# Patient Record
Sex: MALE | Race: WHITE | NOT HISPANIC OR LATINO | Employment: UNEMPLOYED | URBAN - NONMETROPOLITAN AREA
[De-identification: names, ages, dates, MRNs, and addresses within clinical notes are randomized per-mention and may not be internally consistent; named-entity substitution may affect disease eponyms.]

---

## 2024-03-15 ENCOUNTER — HOSPITAL ENCOUNTER (EMERGENCY)
Facility: HOSPITAL | Age: 54
Discharge: ANOTHER HOSPITAL | End: 2024-03-15
Attending: EMERGENCY MEDICINE
Payer: MEDICARE

## 2024-03-15 ENCOUNTER — HOSPITAL ENCOUNTER (OUTPATIENT)
Facility: HOSPITAL | Age: 54
Discharge: LEFT BEFORE DISCHARGE | DRG: 951 | End: 2024-03-15
Attending: INTERNAL MEDICINE | Admitting: INTERNAL MEDICINE
Payer: MEDICARE

## 2024-03-15 ENCOUNTER — APPOINTMENT (OUTPATIENT)
Dept: CARDIOLOGY | Facility: HOSPITAL | Age: 54
DRG: 951 | End: 2024-03-15
Payer: MEDICARE

## 2024-03-15 ENCOUNTER — APPOINTMENT (OUTPATIENT)
Dept: RADIOLOGY | Facility: HOSPITAL | Age: 54
End: 2024-03-15
Payer: MEDICARE

## 2024-03-15 VITALS
OXYGEN SATURATION: 98 % | WEIGHT: 240 LBS | HEART RATE: 108 BPM | BODY MASS INDEX: 32.51 KG/M2 | RESPIRATION RATE: 18 BRPM | TEMPERATURE: 97.9 F | SYSTOLIC BLOOD PRESSURE: 161 MMHG | HEIGHT: 72 IN | DIASTOLIC BLOOD PRESSURE: 112 MMHG

## 2024-03-15 VITALS — SYSTOLIC BLOOD PRESSURE: 172 MMHG | HEART RATE: 98 BPM | RESPIRATION RATE: 16 BRPM | DIASTOLIC BLOOD PRESSURE: 93 MMHG

## 2024-03-15 DIAGNOSIS — N13.2 HYDRONEPHROSIS CONCURRENT WITH AND DUE TO CALCULI OF KIDNEY AND URETER: ICD-10-CM

## 2024-03-15 DIAGNOSIS — N25.89 ACIDOSIS, RENAL TUBULAR: ICD-10-CM

## 2024-03-15 DIAGNOSIS — I10 HYPERTENSION, UNSPECIFIED TYPE: ICD-10-CM

## 2024-03-15 DIAGNOSIS — N17.9 ACUTE RENAL FAILURE, UNSPECIFIED ACUTE RENAL FAILURE TYPE (CMS-HCC): ICD-10-CM

## 2024-03-15 DIAGNOSIS — N20.1 CALCULUS OF DISTAL LEFT URETER: Primary | ICD-10-CM

## 2024-03-15 LAB
ANION GAP BLDV CALCULATED.4IONS-SCNC: 24 MMOL/L (ref 10–25)
ANION GAP SERPL CALC-SCNC: 24 MMOL/L (ref 10–20)
ANION GAP SERPL CALC-SCNC: 25 MMOL/L (ref 10–20)
APPEARANCE UR: ABNORMAL
BASE EXCESS BLDV CALC-SCNC: -12.4 MMOL/L (ref -2–3)
BASOPHILS # BLD AUTO: 0.04 X10*3/UL (ref 0–0.1)
BASOPHILS NFR BLD AUTO: 0.3 %
BILIRUB UR STRIP.AUTO-MCNC: NEGATIVE MG/DL
BODY TEMPERATURE: ABNORMAL
BUN SERPL-MCNC: 88 MG/DL (ref 6–23)
BUN SERPL-MCNC: 89 MG/DL (ref 6–23)
CA-I BLDV-SCNC: 1.15 MMOL/L (ref 1.1–1.33)
CALCIUM SERPL-MCNC: 9 MG/DL (ref 8.6–10.3)
CALCIUM SERPL-MCNC: 9.1 MG/DL (ref 8.6–10.3)
CHLORIDE BLDV-SCNC: 104 MMOL/L (ref 98–107)
CHLORIDE SERPL-SCNC: 102 MMOL/L (ref 98–107)
CHLORIDE SERPL-SCNC: 102 MMOL/L (ref 98–107)
CO2 SERPL-SCNC: 12 MMOL/L (ref 21–32)
CO2 SERPL-SCNC: 13 MMOL/L (ref 21–32)
COLOR UR: YELLOW
CREAT SERPL-MCNC: 5.65 MG/DL (ref 0.5–1.3)
CREAT SERPL-MCNC: 5.98 MG/DL (ref 0.5–1.3)
EGFRCR SERPLBLD CKD-EPI 2021: 11 ML/MIN/1.73M*2
EGFRCR SERPLBLD CKD-EPI 2021: 11 ML/MIN/1.73M*2
EOSINOPHIL # BLD AUTO: 0.09 X10*3/UL (ref 0–0.7)
EOSINOPHIL NFR BLD AUTO: 0.6 %
ERYTHROCYTE [DISTWIDTH] IN BLOOD BY AUTOMATED COUNT: 13.8 % (ref 11.5–14.5)
GLUCOSE BLD MANUAL STRIP-MCNC: 104 MG/DL (ref 74–99)
GLUCOSE BLDV-MCNC: 118 MG/DL (ref 74–99)
GLUCOSE SERPL-MCNC: 121 MG/DL (ref 74–99)
GLUCOSE SERPL-MCNC: 133 MG/DL (ref 74–99)
GLUCOSE UR STRIP.AUTO-MCNC: NEGATIVE MG/DL
HCO3 BLDV-SCNC: 12.5 MMOL/L (ref 22–26)
HCT VFR BLD AUTO: 38 % (ref 41–52)
HCT VFR BLD EST: 39 % (ref 41–52)
HGB BLD-MCNC: 12.6 G/DL (ref 13.5–17.5)
HGB BLDV-MCNC: 13.1 G/DL (ref 13.5–17.5)
HOLD SPECIMEN: NORMAL
HOLD SPECIMEN: NORMAL
HYALINE CASTS #/AREA URNS AUTO: ABNORMAL /LPF
IMM GRANULOCYTES # BLD AUTO: 0.07 X10*3/UL (ref 0–0.7)
IMM GRANULOCYTES NFR BLD AUTO: 0.5 % (ref 0–0.9)
INHALED O2 CONCENTRATION: 21 %
KETONES UR STRIP.AUTO-MCNC: NEGATIVE MG/DL
LACTATE BLDV-SCNC: 0.7 MMOL/L (ref 0.4–2)
LACTATE SERPL-SCNC: 0.9 MMOL/L (ref 0.4–2)
LEUKOCYTE ESTERASE UR QL STRIP.AUTO: ABNORMAL
LYMPHOCYTES # BLD AUTO: 1.33 X10*3/UL (ref 1.2–4.8)
LYMPHOCYTES NFR BLD AUTO: 8.6 %
MCH RBC QN AUTO: 32.2 PG (ref 26–34)
MCHC RBC AUTO-ENTMCNC: 33.2 G/DL (ref 32–36)
MCV RBC AUTO: 97 FL (ref 80–100)
MONOCYTES # BLD AUTO: 1.33 X10*3/UL (ref 0.1–1)
MONOCYTES NFR BLD AUTO: 8.6 %
NEUTROPHILS # BLD AUTO: 12.58 X10*3/UL (ref 1.2–7.7)
NEUTROPHILS NFR BLD AUTO: 81.4 %
NITRITE UR QL STRIP.AUTO: NEGATIVE
NRBC BLD-RTO: 0 /100 WBCS (ref 0–0)
OXYHGB MFR BLDV: 93.3 % (ref 45–75)
PCO2 BLDV: 26 MM HG (ref 41–51)
PH BLDV: 7.29 PH (ref 7.33–7.43)
PH UR STRIP.AUTO: 5 [PH]
PLATELET # BLD AUTO: 350 X10*3/UL (ref 150–450)
PO2 BLDV: 70 MM HG (ref 35–45)
POTASSIUM BLDV-SCNC: 7.9 MMOL/L (ref 3.5–5.3)
POTASSIUM SERPL-SCNC: 5.9 MMOL/L (ref 3.5–5.3)
POTASSIUM SERPL-SCNC: 6 MMOL/L (ref 3.5–5.3)
PROT UR STRIP.AUTO-MCNC: ABNORMAL MG/DL
RBC # BLD AUTO: 3.91 X10*6/UL (ref 4.5–5.9)
RBC # UR STRIP.AUTO: ABNORMAL /UL
RBC #/AREA URNS AUTO: >20 /HPF
SAO2 % BLDV: 96 % (ref 45–75)
SODIUM BLDV-SCNC: 133 MMOL/L (ref 136–145)
SODIUM SERPL-SCNC: 132 MMOL/L (ref 136–145)
SODIUM SERPL-SCNC: 134 MMOL/L (ref 136–145)
SP GR UR STRIP.AUTO: 1.02
UROBILINOGEN UR STRIP.AUTO-MCNC: <2 MG/DL
WBC # BLD AUTO: 15.4 X10*3/UL (ref 4.4–11.3)
WBC #/AREA URNS AUTO: ABNORMAL /HPF

## 2024-03-15 PROCEDURE — 2720000007 HC OR 272 NO HCPCS

## 2024-03-15 PROCEDURE — 36415 COLL VENOUS BLD VENIPUNCTURE: CPT | Performed by: EMERGENCY MEDICINE

## 2024-03-15 PROCEDURE — 96366 THER/PROPH/DIAG IV INF ADDON: CPT

## 2024-03-15 PROCEDURE — 96375 TX/PRO/DX INJ NEW DRUG ADDON: CPT

## 2024-03-15 PROCEDURE — 82947 ASSAY GLUCOSE BLOOD QUANT: CPT | Mod: 59

## 2024-03-15 PROCEDURE — 74176 CT ABD & PELVIS W/O CONTRAST: CPT

## 2024-03-15 PROCEDURE — 96367 TX/PROPH/DG ADDL SEQ IV INF: CPT

## 2024-03-15 PROCEDURE — 74176 CT ABD & PELVIS W/O CONTRAST: CPT | Performed by: RADIOLOGY

## 2024-03-15 PROCEDURE — 2500000001 HC RX 250 WO HCPCS SELF ADMINISTERED DRUGS (ALT 637 FOR MEDICARE OP)

## 2024-03-15 PROCEDURE — 84132 ASSAY OF SERUM POTASSIUM: CPT | Performed by: EMERGENCY MEDICINE

## 2024-03-15 PROCEDURE — 87086 URINE CULTURE/COLONY COUNT: CPT | Mod: CONLAB | Performed by: EMERGENCY MEDICINE

## 2024-03-15 PROCEDURE — 85025 COMPLETE CBC W/AUTO DIFF WBC: CPT | Performed by: EMERGENCY MEDICINE

## 2024-03-15 PROCEDURE — C1894 INTRO/SHEATH, NON-LASER: HCPCS

## 2024-03-15 PROCEDURE — 2500000004 HC RX 250 GENERAL PHARMACY W/ HCPCS (ALT 636 FOR OP/ED): Performed by: EMERGENCY MEDICINE

## 2024-03-15 PROCEDURE — 2500000002 HC RX 250 W HCPCS SELF ADMINISTERED DRUGS (ALT 637 FOR MEDICARE OP, ALT 636 FOR OP/ED)

## 2024-03-15 PROCEDURE — 2500000001 HC RX 250 WO HCPCS SELF ADMINISTERED DRUGS (ALT 637 FOR MEDICARE OP): Performed by: EMERGENCY MEDICINE

## 2024-03-15 PROCEDURE — 83605 ASSAY OF LACTIC ACID: CPT | Performed by: EMERGENCY MEDICINE

## 2024-03-15 PROCEDURE — 81001 URINALYSIS AUTO W/SCOPE: CPT | Performed by: EMERGENCY MEDICINE

## 2024-03-15 PROCEDURE — 96361 HYDRATE IV INFUSION ADD-ON: CPT

## 2024-03-15 PROCEDURE — 96365 THER/PROPH/DIAG IV INF INIT: CPT

## 2024-03-15 PROCEDURE — 99285 EMERGENCY DEPT VISIT HI MDM: CPT | Mod: 25

## 2024-03-15 PROCEDURE — 2060000001 HC INTERMEDIATE ICU ROOM DAILY

## 2024-03-15 PROCEDURE — 2500000005 HC RX 250 GENERAL PHARMACY W/O HCPCS

## 2024-03-15 RX ORDER — SODIUM CHLORIDE 9 MG/ML
125 INJECTION, SOLUTION INTRAVENOUS CONTINUOUS
Status: DISCONTINUED | OUTPATIENT
Start: 2024-03-15 | End: 2024-03-15 | Stop reason: HOSPADM

## 2024-03-15 RX ORDER — LISINOPRIL AND HYDROCHLOROTHIAZIDE 20; 25 MG/1; MG/1
1 TABLET ORAL DAILY
COMMUNITY

## 2024-03-15 RX ORDER — CLONIDINE HYDROCHLORIDE 0.1 MG/1
TABLET ORAL
Status: COMPLETED
Start: 2024-03-15 | End: 2024-03-15

## 2024-03-15 RX ORDER — CLONIDINE HYDROCHLORIDE 0.1 MG/1
0.1 TABLET ORAL ONCE
Status: COMPLETED | OUTPATIENT
Start: 2024-03-15 | End: 2024-03-15

## 2024-03-15 RX ORDER — ONDANSETRON HYDROCHLORIDE 2 MG/ML
4 INJECTION, SOLUTION INTRAVENOUS ONCE
Status: DISCONTINUED | OUTPATIENT
Start: 2024-03-15 | End: 2024-03-15 | Stop reason: HOSPADM

## 2024-03-15 RX ORDER — SODIUM POLYSTYRENE SULFONATE 15 G/60ML
15 SUSPENSION ORAL; RECTAL ONCE
Status: COMPLETED | OUTPATIENT
Start: 2024-03-15 | End: 2024-03-15

## 2024-03-15 RX ORDER — SODIUM POLYSTYRENE SULFONATE 15 G/60ML
SUSPENSION ORAL; RECTAL
Status: COMPLETED
Start: 2024-03-15 | End: 2024-03-15

## 2024-03-15 RX ORDER — DEXTROSE 50 % IN WATER (D50W) INTRAVENOUS SYRINGE
25 ONCE
Status: COMPLETED | OUTPATIENT
Start: 2024-03-15 | End: 2024-03-15

## 2024-03-15 RX ORDER — DEXTROSE 50 % IN WATER (D50W) INTRAVENOUS SYRINGE
Status: COMPLETED
Start: 2024-03-15 | End: 2024-03-15

## 2024-03-15 RX ORDER — KETOROLAC TROMETHAMINE 30 MG/ML
30 INJECTION, SOLUTION INTRAMUSCULAR; INTRAVENOUS ONCE
Status: COMPLETED | OUTPATIENT
Start: 2024-03-15 | End: 2024-03-15

## 2024-03-15 RX ADMIN — SODIUM CHLORIDE 1000 ML: 9 INJECTION, SOLUTION INTRAVENOUS at 10:34

## 2024-03-15 RX ADMIN — CLONIDINE HYDROCHLORIDE 0.1 MG: 0.1 TABLET ORAL at 11:33

## 2024-03-15 RX ADMIN — SODIUM POLYSTYRENE SULFONATE 15 G: 15 SUSPENSION ORAL; RECTAL at 12:29

## 2024-03-15 RX ADMIN — SODIUM CHLORIDE 125 ML/HR: 9 INJECTION, SOLUTION INTRAVENOUS at 10:35

## 2024-03-15 RX ADMIN — INSULIN HUMAN 5 UNITS: 100 INJECTION, SOLUTION PARENTERAL at 12:30

## 2024-03-15 RX ADMIN — CLONIDINE HYDROCHLORIDE 0.1 MG: 0.1 TABLET ORAL at 14:19

## 2024-03-15 RX ADMIN — DEXTROSE 50 % IN WATER (D50W) INTRAVENOUS SYRINGE 25 G: at 12:29

## 2024-03-15 RX ADMIN — CALCIUM GLUCONATE 1 G: 98 INJECTION, SOLUTION INTRAVENOUS at 12:20

## 2024-03-15 RX ADMIN — DEXTROSE MONOHYDRATE 25 G: 25 INJECTION, SOLUTION INTRAVENOUS at 12:29

## 2024-03-15 RX ADMIN — KETOROLAC TROMETHAMINE 30 MG: 30 INJECTION, SOLUTION INTRAMUSCULAR at 10:32

## 2024-03-15 RX ADMIN — SODIUM BICARBONATE 75 ML/HR: 84 INJECTION, SOLUTION INTRAVENOUS at 13:38

## 2024-03-15 ASSESSMENT — COLUMBIA-SUICIDE SEVERITY RATING SCALE - C-SSRS
2. HAVE YOU ACTUALLY HAD ANY THOUGHTS OF KILLING YOURSELF?: NO
1. IN THE PAST MONTH, HAVE YOU WISHED YOU WERE DEAD OR WISHED YOU COULD GO TO SLEEP AND NOT WAKE UP?: NO
6. HAVE YOU EVER DONE ANYTHING, STARTED TO DO ANYTHING, OR PREPARED TO DO ANYTHING TO END YOUR LIFE?: NO
1. IN THE PAST MONTH, HAVE YOU WISHED YOU WERE DEAD OR WISHED YOU COULD GO TO SLEEP AND NOT WAKE UP?: NO
6. HAVE YOU EVER DONE ANYTHING, STARTED TO DO ANYTHING, OR PREPARED TO DO ANYTHING TO END YOUR LIFE?: NO
2. HAVE YOU ACTUALLY HAD ANY THOUGHTS OF KILLING YOURSELF?: NO

## 2024-03-15 ASSESSMENT — PAIN SCALES - GENERAL
PAINLEVEL_OUTOF10: 5 - MODERATE PAIN
PAINLEVEL_OUTOF10: 5 - MODERATE PAIN

## 2024-03-15 ASSESSMENT — PAIN DESCRIPTION - ORIENTATION: ORIENTATION: LEFT

## 2024-03-15 ASSESSMENT — PAIN - FUNCTIONAL ASSESSMENT
PAIN_FUNCTIONAL_ASSESSMENT: 0-10
PAIN_FUNCTIONAL_ASSESSMENT: 0-10

## 2024-03-15 ASSESSMENT — PAIN DESCRIPTION - PROGRESSION: CLINICAL_PROGRESSION: NOT CHANGED

## 2024-03-15 ASSESSMENT — PAIN DESCRIPTION - PAIN TYPE: TYPE: ACUTE PAIN

## 2024-03-15 NOTE — ED PROVIDER NOTES
[facility]   ED  Provider Note  3/15/2024  9:20 AM  AC04/AC04        History of Present Illness:   Iglesia Clarke is a 53 y.o. male presenting to the ED for renal colic, beginning last night.  The complaint has been persistent, we are in severity, and worsened by nothing.  Patient has passed multiple kidney stones in the past he feels the stone is stuck at the base of his penis.  He cannot urinate more  than a few drops of urine at a time.  He tried to put a pen  in his penis to release the stone but did not work.  He also has a large left inguinal hernia with bowel in the scrotum.      Review of Systems:   Pertinent positives and review of systems as noted above.  Remaining 10 review of systems is negative or noncontributory to today's episode of care.  Review of Systems       --------------------------------------------- PAST HISTORY ---------------------------------------------  Past Medical History:  has a past medical history of Kidney stones.  Hypertension, left inguinal hernia with bowel in the left scrotum.     Past Surgical History:  has no past surgical history on file.    Social History:  reports that he has never smoked. He has never used smokeless tobacco. He reports that he does not currently use alcohol.    Family History: family history is not on file. Unless otherwise noted, family history is non contributory    Patient's Medications   New Prescriptions    No medications on file   Previous Medications    LISINOPRIL-HYDROCHLOROTHIAZIDE 20-25 MG TABLET    Take 1 tablet by mouth once daily.   Modified Medications    No medications on file   Discontinued Medications    No medications on file      The patient’s home medications have been reviewed.    Allergies: Opioids - morphine analogues    -------------------------------------------------- RESULTS -------------------------------------------------  All laboratory and radiology results have been personally reviewed by myself   LABS:  Labs Reviewed    URINALYSIS WITH REFLEX MICROSCOPIC - Abnormal       Result Value    Color, Urine Yellow      Appearance, Urine Hazy (*)     Specific Gravity, Urine 1.018      pH, Urine 5.0      Protein, Urine 100 (2+) (*)     Glucose, Urine NEGATIVE      Blood, Urine LARGE (3+) (*)     Ketones, Urine NEGATIVE      Bilirubin, Urine NEGATIVE      Urobilinogen, Urine <2.0      Nitrite, Urine NEGATIVE      Leukocyte Esterase, Urine SMALL (1+) (*)    BASIC METABOLIC PANEL - Abnormal    Glucose 121 (*)     Sodium 132 (*)     Potassium 5.9 (*)     Chloride 102      Bicarbonate 12 (*)     Anion Gap 24 (*)     Urea Nitrogen 89 (*)     Creatinine 5.65 (*)     eGFR 11 (*)     Calcium 9.1     CBC WITH AUTO DIFFERENTIAL - Abnormal    WBC 15.4 (*)     nRBC 0.0      RBC 3.91 (*)     Hemoglobin 12.6 (*)     Hematocrit 38.0 (*)     MCV 97      MCH 32.2      MCHC 33.2      RDW 13.8      Platelets 350      Neutrophils % 81.4      Immature Granulocytes %, Automated 0.5      Lymphocytes % 8.6      Monocytes % 8.6      Eosinophils % 0.6      Basophils % 0.3      Neutrophils Absolute 12.58 (*)     Immature Granulocytes Absolute, Automated 0.07      Lymphocytes Absolute 1.33      Monocytes Absolute 1.33 (*)     Eosinophils Absolute 0.09      Basophils Absolute 0.04     MICROSCOPIC ONLY, URINE - Abnormal    WBC, Urine 1-5      RBC, Urine >20 (*)     Hyaline Casts, Urine OCCASIONAL (*)    LACTATE - Normal    Lactate 0.9      Narrative:     Venipuncture immediately after or during the administration of Metamizole may lead to falsely low results. Testing should be performed immediately  prior to Metamizole dosing.   URINE CULTURE   BLOOD GAS VENOUS FULL PANEL     EKG: Sinus tachycardia at 108 bpm, normal axis, right bundle branch block, nonspecific ST changes, no acute ST elevations.  Interpreted by HERNAN Castillo MD    RADIOLOGY:  Interpreted by Radiologist.  CT abdomen pelvis wo IV contrast   Final Result   1. 9.7 mm obstructing stone within the distal left  ureter with   moderate hydronephrosis and hydroureter. Several additional stones   seen about the left kidney.   2. Severe atrophy of the right kidney likely due to chronic   obstruction from distal ureteral stones. No hydronephrosis or   hydroureter to suggest significant residual renal function.   3. Large left inguinal hernia only partly visualized extending into   the left hemiscrotum.        MACRO:   None.        Signed by: Jose Guadalupe Ireland 3/15/2024 10:23 AM   Dictation workstation:   [id number]          No results found for this or any previous visit (from the past 4464 hour(s)).  ------------------------- NURSING NOTES AND VITALS REVIEWED ---------------------------   The nursing notes within the ED encounter and vital signs as below have been reviewed.   BP (!) 200/123   Pulse (!) 118   Temp 36.6 °C (97.9 °F) (Temporal)   Resp 18   Ht 1.829 m (6')   Wt 109 kg (240 lb)   SpO2 94%   BMI 32.55 kg/m²   Oxygen Saturation Interpretation: Normal      ---------------------------------------------------PHYSICAL EXAM--------------------------------------  Physical Exam   Constitutional/General: Alert and oriented x3, well appearing, non toxic in NAD  Head: Normocephalic and atraumatic  Eyes: PERRL, EOMI, conjunctiva normal, sclera non icteric  Mouth: Oropharynx clear, handling secretions, no trismus, no asymmetry of the posterior oropharynx or uvular edema  Neck: Supple, full ROM, non tender to palpation in the midline, no stridor, no crepitus, no meningeal signs  Respiratory: Lungs clear to auscultation bilaterally, no wheezes, rales, or rhonchi. Not in respiratory distress  Cardiovascular:  Regular rate. Regular rhythm. No murmurs, gallops, or rubs. 2+ distal pulses  Chest: No chest wall tenderness  GI:  Abdomen Soft, Non tender, Non distended.  +BS. No organomegaly, no palpable masses,  No rebound, guarding, or rigidity.   Musculoskeletal: Moves all extremities x 4. Warm and well perfused, no clubbing,  cyanosis, or edema. Capillary refill <3 seconds  Integument: skin warm and dry. No rashes.   Lymphatic: no lymphadenopathy noted  Neurologic: No focal deficits, symmetric strength 5/5 in the upper and lower extremities bilaterally  Psychiatric: Normal Affect    Procedures    ------------------------------ ED COURSE/MEDICAL DECISION MAKING----------------------  Diagnoses as of 03/15/24 1206   Calculus of distal left ureter   Hydronephrosis concurrent with and due to calculi of kidney and ureter   Acute renal failure, unspecified acute renal failure type (CMS/HCC)   Acidosis, renal tubular   Hypertension, unspecified type      Patient has a large 9.5 mm stone blocking his left distal ureter.  His right kidney is atrophic and does not appear to be functional.  His BUN is 89 with a creatinine of 4.6.  His GFR is 11.  His last GFR in the computer was in 2019 was 35.  Patient also has a potassium of 5.9.  He has been treated with a potassium cocktail including insulin, D50, Kayexalate, calcium oxalate, and a sodium bicarbonate drip.  I discussed this case with Alvino Chu and Emmanuel from [facility], Alvino Kang and Elli from [facility] to arrange urgent decompression of the left kidney.  He has been treated with clonidine for his significant hypertension his current blood pressure is down to 192/113.  This pain has been controlled with Toradol and Zofran.  Patient's repeat potassium is 6.0 up from 5.9 this morning.  This is after his treatment for hyperkalemia.  He is being transferred to [facility] for further care and treatment.      Medical Decision Making:   Transfer  Diagnoses as of 03/15/24 1206   Calculus of distal left ureter   Hydronephrosis concurrent with and due to calculi of kidney and ureter   Acute renal failure, unspecified acute renal failure type (CMS/HCC)   Acidosis, renal tubular   Hypertension, unspecified type      Counseling:   The emergency provider has spoken with the patient and discussed today’s  results, in addition to providing specific details for the plan of care and counseling regarding the diagnosis and prognosis.  Questions are answered at this time and they are agreeable with the plan.      --------------------------------- IMPRESSION AND DISPOSITION ---------------------------------        IMPRESSION  1. Calculus of distal left ureter    2. Hydronephrosis concurrent with and due to calculi of kidney and ureter    3. Acute renal failure, unspecified acute renal failure type (CMS/[facility])    4. Acidosis, renal tubular    5. Hypertension, unspecified type        DISPOSITION  Disposition: [facility]  Patient condition is critical  CRITICAL CARE TIME     CRITICAL CARE :  The high probability of clinically significant deterioration in the patient’s condition required my highest level of medical decision making and my highest level of preparedness to intervene emergently.   I provided minutes 45 of critical care, not including other billable services/procedures.    The patient was reevaluated/re-examined multiple times during the visit. Critical care time includes management at bedside, discussion with other providers and consultants, family counseling and answering questions, and documentation. Care involves decision making of high complexity to assess, manipulate, and support vital organ system failure and/or to prevent further life threatening deterioration of the patient's condition. Failure to initiate these interventions on an urgent basis would likely result in sudden, clinically significant or life threatening deterioration in the patient's condition of acute renal failure, renal htn, hyperkalemia, left hydronephrosis.       Jluis Castillo MD  03/17/24 3276

## 2024-03-15 NOTE — NURSING NOTE
Patient spoke with Dr Warren and refused procedure then asked the nurse to call his friend to pick him up Dr Warren returned and spoke with patient.  Patient decided to leave and was explained risks AMA paper provided patient refused to sign paper with Dr Warren present.  Surendra Reno supervisor notified.

## 2024-03-17 LAB — BACTERIA UR CULT: NO GROWTH

## 2024-03-20 ENCOUNTER — HOSPITAL ENCOUNTER (OUTPATIENT)
Dept: CARDIOLOGY | Facility: HOSPITAL | Age: 54
Discharge: HOME | End: 2024-03-20
Payer: MEDICARE

## 2024-03-20 LAB
ATRIAL RATE: 108 BPM
P AXIS: 41 DEGREES
P OFFSET: 182 MS
P ONSET: 130 MS
PR INTERVAL: 180 MS
Q ONSET: 220 MS
QRS COUNT: 17 BEATS
QRS DURATION: 140 MS
QT INTERVAL: 352 MS
QTC CALCULATION(BAZETT): 471 MS
QTC FREDERICIA: 428 MS
R AXIS: -10 DEGREES
T AXIS: 74 DEGREES
T OFFSET: 396 MS
VENTRICULAR RATE: 108 BPM

## 2024-03-20 PROCEDURE — 93005 ELECTROCARDIOGRAM TRACING: CPT

## 2024-03-29 ENCOUNTER — APPOINTMENT (OUTPATIENT)
Dept: RADIOLOGY | Facility: HOSPITAL | Age: 54
End: 2024-03-29
Payer: MEDICARE

## 2024-03-29 ENCOUNTER — HOSPITAL ENCOUNTER (EMERGENCY)
Facility: HOSPITAL | Age: 54
Discharge: OTHER INSTITUTION | End: 2024-03-30
Attending: EMERGENCY MEDICINE
Payer: MEDICARE

## 2024-03-29 DIAGNOSIS — K40.90 SCROTAL HERNIA: ICD-10-CM

## 2024-03-29 DIAGNOSIS — N17.9 ACUTE RENAL FAILURE, UNSPECIFIED ACUTE RENAL FAILURE TYPE (CMS-HCC): ICD-10-CM

## 2024-03-29 DIAGNOSIS — E87.5 HYPERKALEMIA: Primary | ICD-10-CM

## 2024-03-29 LAB
ALBUMIN SERPL BCP-MCNC: 4.9 G/DL (ref 3.4–5)
ALP SERPL-CCNC: 58 U/L (ref 33–120)
ALT SERPL W P-5'-P-CCNC: 9 U/L (ref 10–52)
ANION GAP SERPL CALC-SCNC: 19 MMOL/L (ref 10–20)
AST SERPL W P-5'-P-CCNC: 8 U/L (ref 9–39)
BASOPHILS # BLD AUTO: 0.03 X10*3/UL (ref 0–0.1)
BASOPHILS NFR BLD AUTO: 0.2 %
BILIRUB SERPL-MCNC: 0.3 MG/DL (ref 0–1.2)
BUN SERPL-MCNC: 138 MG/DL (ref 6–23)
CALCIUM SERPL-MCNC: 10.1 MG/DL (ref 8.6–10.3)
CARDIAC TROPONIN I PNL SERPL HS: 40 NG/L (ref 0–20)
CHLORIDE SERPL-SCNC: 111 MMOL/L (ref 98–107)
CO2 SERPL-SCNC: 7 MMOL/L (ref 21–32)
CREAT SERPL-MCNC: 5.21 MG/DL (ref 0.5–1.3)
EGFRCR SERPLBLD CKD-EPI 2021: 12 ML/MIN/1.73M*2
EOSINOPHIL # BLD AUTO: 0.09 X10*3/UL (ref 0–0.7)
EOSINOPHIL NFR BLD AUTO: 0.6 %
ERYTHROCYTE [DISTWIDTH] IN BLOOD BY AUTOMATED COUNT: 14.8 % (ref 11.5–14.5)
FLUAV RNA RESP QL NAA+PROBE: NOT DETECTED
FLUBV RNA RESP QL NAA+PROBE: NOT DETECTED
GLUCOSE SERPL-MCNC: 124 MG/DL (ref 74–99)
HCT VFR BLD AUTO: 36.8 % (ref 41–52)
HGB BLD-MCNC: 11.7 G/DL (ref 13.5–17.5)
IMM GRANULOCYTES # BLD AUTO: 0.07 X10*3/UL (ref 0–0.7)
IMM GRANULOCYTES NFR BLD AUTO: 0.5 % (ref 0–0.9)
LYMPHOCYTES # BLD AUTO: 1.74 X10*3/UL (ref 1.2–4.8)
LYMPHOCYTES NFR BLD AUTO: 12 %
MAGNESIUM SERPL-MCNC: 2.26 MG/DL (ref 1.6–2.4)
MCH RBC QN AUTO: 32.3 PG (ref 26–34)
MCHC RBC AUTO-ENTMCNC: 31.8 G/DL (ref 32–36)
MCV RBC AUTO: 102 FL (ref 80–100)
MONOCYTES # BLD AUTO: 1.38 X10*3/UL (ref 0.1–1)
MONOCYTES NFR BLD AUTO: 9.5 %
NEUTROPHILS # BLD AUTO: 11.22 X10*3/UL (ref 1.2–7.7)
NEUTROPHILS NFR BLD AUTO: 77.2 %
NRBC BLD-RTO: 0 /100 WBCS (ref 0–0)
PLATELET # BLD AUTO: 517 X10*3/UL (ref 150–450)
POTASSIUM SERPL-SCNC: 6.1 MMOL/L (ref 3.5–5.3)
PROT SERPL-MCNC: 9.1 G/DL (ref 6.4–8.2)
RBC # BLD AUTO: 3.62 X10*6/UL (ref 4.5–5.9)
SARS-COV-2 RNA RESP QL NAA+PROBE: NOT DETECTED
SODIUM SERPL-SCNC: 131 MMOL/L (ref 136–145)
WBC # BLD AUTO: 14.5 X10*3/UL (ref 4.4–11.3)

## 2024-03-29 PROCEDURE — 80053 COMPREHEN METABOLIC PANEL: CPT | Performed by: EMERGENCY MEDICINE

## 2024-03-29 PROCEDURE — 84484 ASSAY OF TROPONIN QUANT: CPT | Performed by: EMERGENCY MEDICINE

## 2024-03-29 PROCEDURE — 36415 COLL VENOUS BLD VENIPUNCTURE: CPT | Performed by: EMERGENCY MEDICINE

## 2024-03-29 PROCEDURE — 85025 COMPLETE CBC W/AUTO DIFF WBC: CPT | Performed by: EMERGENCY MEDICINE

## 2024-03-29 PROCEDURE — 87636 SARSCOV2 & INF A&B AMP PRB: CPT | Performed by: EMERGENCY MEDICINE

## 2024-03-29 PROCEDURE — 71045 X-RAY EXAM CHEST 1 VIEW: CPT

## 2024-03-29 PROCEDURE — 83735 ASSAY OF MAGNESIUM: CPT | Performed by: EMERGENCY MEDICINE

## 2024-03-29 PROCEDURE — 74176 CT ABD & PELVIS W/O CONTRAST: CPT

## 2024-03-29 PROCEDURE — 71045 X-RAY EXAM CHEST 1 VIEW: CPT | Performed by: RADIOLOGY

## 2024-03-29 PROCEDURE — 93975 VASCULAR STUDY: CPT | Performed by: RADIOLOGY

## 2024-03-29 PROCEDURE — 76870 US EXAM SCROTUM: CPT | Performed by: RADIOLOGY

## 2024-03-29 PROCEDURE — 93975 VASCULAR STUDY: CPT

## 2024-03-29 RX ORDER — ACETAMINOPHEN AND CODEINE PHOSPHATE 300; 30 MG/1; MG/1
1 TABLET ORAL EVERY 8 HOURS PRN
COMMUNITY

## 2024-03-29 RX ORDER — DOCUSATE SODIUM 250 MG
250 CAPSULE ORAL DAILY
COMMUNITY

## 2024-03-29 RX ORDER — KETOROLAC TROMETHAMINE 10 MG/1
10 TABLET, FILM COATED ORAL EVERY 6 HOURS PRN
COMMUNITY

## 2024-03-29 ASSESSMENT — PAIN - FUNCTIONAL ASSESSMENT
PAIN_FUNCTIONAL_ASSESSMENT: 0-10
PAIN_FUNCTIONAL_ASSESSMENT: 0-10

## 2024-03-29 ASSESSMENT — PAIN SCALES - GENERAL
PAINLEVEL_OUTOF10: 7
PAINLEVEL_OUTOF10: 0 - NO PAIN

## 2024-03-29 ASSESSMENT — PAIN DESCRIPTION - DESCRIPTORS: DESCRIPTORS: OTHER (COMMENT)

## 2024-03-29 ASSESSMENT — COLUMBIA-SUICIDE SEVERITY RATING SCALE - C-SSRS
2. HAVE YOU ACTUALLY HAD ANY THOUGHTS OF KILLING YOURSELF?: NO
6. HAVE YOU EVER DONE ANYTHING, STARTED TO DO ANYTHING, OR PREPARED TO DO ANYTHING TO END YOUR LIFE?: NO
1. IN THE PAST MONTH, HAVE YOU WISHED YOU WERE DEAD OR WISHED YOU COULD GO TO SLEEP AND NOT WAKE UP?: NO

## 2024-03-29 ASSESSMENT — PAIN DESCRIPTION - FREQUENCY: FREQUENCY: INTERMITTENT

## 2024-03-29 ASSESSMENT — PAIN DESCRIPTION - LOCATION: LOCATION: SCROTUM

## 2024-03-29 ASSESSMENT — PAIN DESCRIPTION - ONSET: ONSET: GRADUAL

## 2024-03-29 ASSESSMENT — PAIN DESCRIPTION - PROGRESSION: CLINICAL_PROGRESSION: GRADUALLY WORSENING

## 2024-03-30 ENCOUNTER — APPOINTMENT (OUTPATIENT)
Dept: CARDIOLOGY | Facility: HOSPITAL | Age: 54
End: 2024-03-30
Payer: MEDICARE

## 2024-03-30 ENCOUNTER — APPOINTMENT (OUTPATIENT)
Dept: RADIOLOGY | Facility: HOSPITAL | Age: 54
End: 2024-03-30
Payer: MEDICARE

## 2024-03-30 VITALS
RESPIRATION RATE: 19 BRPM | HEIGHT: 72 IN | DIASTOLIC BLOOD PRESSURE: 62 MMHG | OXYGEN SATURATION: 100 % | HEART RATE: 89 BPM | BODY MASS INDEX: 32.51 KG/M2 | TEMPERATURE: 98 F | WEIGHT: 240 LBS | SYSTOLIC BLOOD PRESSURE: 115 MMHG

## 2024-03-30 PROBLEM — E87.5 HYPERKALEMIA: Status: ACTIVE | Noted: 2024-03-30

## 2024-03-30 PROBLEM — K40.90 SCROTAL HERNIA: Status: ACTIVE | Noted: 2024-03-30

## 2024-03-30 LAB
ALBUMIN SERPL BCP-MCNC: 4.1 G/DL (ref 3.4–5)
ALP SERPL-CCNC: 50 U/L (ref 33–120)
ALT SERPL W P-5'-P-CCNC: 7 U/L (ref 10–52)
ANION GAP SERPL CALC-SCNC: 19 MMOL/L (ref 10–20)
AST SERPL W P-5'-P-CCNC: 7 U/L (ref 9–39)
BILIRUB SERPL-MCNC: 0.3 MG/DL (ref 0–1.2)
BUN SERPL-MCNC: 130 MG/DL (ref 6–23)
CALCIUM SERPL-MCNC: 9.8 MG/DL (ref 8.6–10.3)
CARDIAC TROPONIN I PNL SERPL HS: 39 NG/L (ref 0–20)
CARDIAC TROPONIN I PNL SERPL HS: 42 NG/L (ref 0–20)
CHLORIDE SERPL-SCNC: 115 MMOL/L (ref 98–107)
CO2 SERPL-SCNC: 7 MMOL/L (ref 21–32)
CREAT SERPL-MCNC: 4.75 MG/DL (ref 0.5–1.3)
EGFRCR SERPLBLD CKD-EPI 2021: 14 ML/MIN/1.73M*2
GLUCOSE BLD MANUAL STRIP-MCNC: 161 MG/DL (ref 74–99)
GLUCOSE SERPL-MCNC: 112 MG/DL (ref 74–99)
HOLD SPECIMEN: NORMAL
POTASSIUM SERPL-SCNC: 6.1 MMOL/L (ref 3.5–5.3)
PROT SERPL-MCNC: 7.7 G/DL (ref 6.4–8.2)
SODIUM SERPL-SCNC: 135 MMOL/L (ref 136–145)

## 2024-03-30 PROCEDURE — 96365 THER/PROPH/DIAG IV INF INIT: CPT

## 2024-03-30 PROCEDURE — 93005 ELECTROCARDIOGRAM TRACING: CPT

## 2024-03-30 PROCEDURE — 74176 CT ABD & PELVIS W/O CONTRAST: CPT | Performed by: RADIOLOGY

## 2024-03-30 PROCEDURE — 36415 COLL VENOUS BLD VENIPUNCTURE: CPT | Performed by: EMERGENCY MEDICINE

## 2024-03-30 PROCEDURE — 2500000004 HC RX 250 GENERAL PHARMACY W/ HCPCS (ALT 636 FOR OP/ED): Performed by: EMERGENCY MEDICINE

## 2024-03-30 PROCEDURE — 84484 ASSAY OF TROPONIN QUANT: CPT | Performed by: EMERGENCY MEDICINE

## 2024-03-30 PROCEDURE — 2500000004 HC RX 250 GENERAL PHARMACY W/ HCPCS (ALT 636 FOR OP/ED): Mod: JZ,TB | Performed by: EMERGENCY MEDICINE

## 2024-03-30 PROCEDURE — 82947 ASSAY GLUCOSE BLOOD QUANT: CPT | Mod: 59

## 2024-03-30 PROCEDURE — 2500000001 HC RX 250 WO HCPCS SELF ADMINISTERED DRUGS (ALT 637 FOR MEDICARE OP): Performed by: EMERGENCY MEDICINE

## 2024-03-30 PROCEDURE — 2500000001 HC RX 250 WO HCPCS SELF ADMINISTERED DRUGS (ALT 637 FOR MEDICARE OP)

## 2024-03-30 PROCEDURE — 70450 CT HEAD/BRAIN W/O DYE: CPT

## 2024-03-30 PROCEDURE — 2500000002 HC RX 250 W HCPCS SELF ADMINISTERED DRUGS (ALT 637 FOR MEDICARE OP, ALT 636 FOR OP/ED): Performed by: EMERGENCY MEDICINE

## 2024-03-30 PROCEDURE — 80053 COMPREHEN METABOLIC PANEL: CPT | Performed by: EMERGENCY MEDICINE

## 2024-03-30 PROCEDURE — 2500000005 HC RX 250 GENERAL PHARMACY W/O HCPCS: Performed by: EMERGENCY MEDICINE

## 2024-03-30 PROCEDURE — 99291 CRITICAL CARE FIRST HOUR: CPT | Performed by: EMERGENCY MEDICINE

## 2024-03-30 PROCEDURE — 96375 TX/PRO/DX INJ NEW DRUG ADDON: CPT

## 2024-03-30 PROCEDURE — 70450 CT HEAD/BRAIN W/O DYE: CPT | Performed by: RADIOLOGY

## 2024-03-30 RX ORDER — SODIUM CHLORIDE 9 MG/ML
100 INJECTION, SOLUTION INTRAVENOUS CONTINUOUS
Status: DISCONTINUED | OUTPATIENT
Start: 2024-03-30 | End: 2024-03-30 | Stop reason: HOSPADM

## 2024-03-30 RX ORDER — CALCIUM GLUCONATE 20 MG/ML
2 INJECTION, SOLUTION INTRAVENOUS ONCE
Status: COMPLETED | OUTPATIENT
Start: 2024-03-30 | End: 2024-03-30

## 2024-03-30 RX ORDER — DEXTROSE 50 % IN WATER (D50W) INTRAVENOUS SYRINGE
25 ONCE
Status: COMPLETED | OUTPATIENT
Start: 2024-03-30 | End: 2024-03-30

## 2024-03-30 RX ORDER — TRAMADOL HYDROCHLORIDE 50 MG/1
TABLET ORAL
Status: COMPLETED
Start: 2024-03-30 | End: 2024-03-30

## 2024-03-30 RX ORDER — SODIUM POLYSTYRENE SULFONATE 15 G/60ML
15 SUSPENSION ORAL; RECTAL ONCE
Status: COMPLETED | OUTPATIENT
Start: 2024-03-30 | End: 2024-03-30

## 2024-03-30 RX ORDER — INDOMETHACIN 25 MG/1
50 CAPSULE ORAL ONCE
Status: COMPLETED | OUTPATIENT
Start: 2024-03-30 | End: 2024-03-30

## 2024-03-30 RX ORDER — TRAMADOL HYDROCHLORIDE 50 MG/1
50 TABLET ORAL ONCE
Status: COMPLETED | OUTPATIENT
Start: 2024-03-30 | End: 2024-03-30

## 2024-03-30 RX ADMIN — TRAMADOL HYDROCHLORIDE 50 MG: 50 TABLET ORAL at 12:58

## 2024-03-30 RX ADMIN — DEXTROSE MONOHYDRATE 25 G: 25 INJECTION, SOLUTION INTRAVENOUS at 01:20

## 2024-03-30 RX ADMIN — SODIUM CHLORIDE 100 ML/HR: 9 INJECTION, SOLUTION INTRAVENOUS at 11:40

## 2024-03-30 RX ADMIN — TRAMADOL HYDROCHLORIDE 50 MG: 50 TABLET, COATED ORAL at 12:58

## 2024-03-30 RX ADMIN — INSULIN HUMAN 5 UNITS: 100 INJECTION, SOLUTION PARENTERAL at 01:22

## 2024-03-30 RX ADMIN — SODIUM BICARBONATE 50 MEQ: 84 INJECTION, SOLUTION INTRAVENOUS at 01:20

## 2024-03-30 RX ADMIN — SODIUM POLYSTYRENE SULFONATE 15 G: 15 SUSPENSION ORAL; RECTAL at 01:37

## 2024-03-30 RX ADMIN — CALCIUM GLUCONATE 2 G: 20 INJECTION, SOLUTION INTRAVENOUS at 01:23

## 2024-03-30 ASSESSMENT — PAIN SCALES - GENERAL
PAINLEVEL_OUTOF10: 0 - NO PAIN

## 2024-03-30 NOTE — ED PROVIDER NOTES
HPI   Chief Complaint   Patient presents with    Back Pain    Groin Pain    Flank Pain     PER EMS- GREAT DIFFICULTY GETTING PT COMPLAINT,  FLANK PAIN, BACK PAIN, DIFFICULTY GETTING EXACT COMPLAINT CLEAR.       Chief complaint the patient is a poor historian but states he is feeling weak all day feels he has no energy.  Patient states that he has had some flank pain and thinks he still has a kidney stone  History of present illness this patient presented by natanael states he lives alone and did not want us to call any family members.  The patient initially was difficult to interview because he appeared to be at times a bit confused however he knows his name he knows he is in the [facility] he knows his age he is not sure what is wrong with him other than he has very weak all over and feels tired and has had on and off some pain in his back in the low back.  The patient has no vomiting no diarrhea the patient states he has had some very brief chest pains on and off that lasts just a few seconds the patient states he has no pain at the present time he states he has barely been feeling short of breath from time to time but no cough no chest congestion although at times he feels like he does have some chest congestion.  The patient states he has a 9 mm kidney stone diagnosed a week ago that is supposed to be operated on Monday by Doctor Landin in [facility].  The patient denies any decline in urinary output.  I noticed she has renal failure and over the past 2 or 3 weeks the patient has had high BUN and creatinines but 1 year ago is renal functions were normal.  Patient states he does not know why he has renal failure at home he has been taking lisinopril and Toradol however    physical exam:    General: Vitals noted, no distress. Afebrile. Alert times very vague with his history and occasionally confused but does know his name, his age, he knows the he is at the hospital here, and he  understands that he has kidney problem stating he has a 9 mm stone and needed surgery on Monday..  Pupils equal and reactive bilaterally    EENT: TMs clear. Posterior oropharynx unremarkable. No meningismus. No LAD.     Cardiac: Regular, rate, rhythm, no murmurs rubs or gallops.     Pulmonary: Lungs clear bilaterally with good aeration. No adventitious breath sounds. No wheezes rales or rhonchi.     Abdomen: Soft, nonsurgical. Nontender. No peritoneal signs. Normoactive bowel sounds. No pulsatile masses.     Extremities: No peripheral edema. Negative Homans bilaterally, no cords.    Skin: No rash. Intact.     Neuro: No focal neurologic deficits, NIH score of 0. Cranial nerves normal as tested from II through XII.                               Chrystal Coma Scale Score: 15                     Patient History   Past Medical History:   Diagnosis Date    Kidney stones      History reviewed. No pertinent surgical history.  No family history on file.  Social History     Tobacco Use    Smoking status: Never    Smokeless tobacco: Never   Substance Use Topics    Alcohol use: Not Currently    Drug use: Not on file       Physical Exam   ED Triage Vitals [03/29/24 2231]   Temperature Heart Rate Respirations BP   36.7 °C (98 °F) 100 (!) 21 140/70      SpO2 Temp Source Heart Rate Source Patient Position   100 % Tympanic Monitor --      BP Location FiO2 (%)     -- --       Physical Exam    ED Course & MDM   ED Course as of 03/30/24 0654   Sat Mar 30, 2024   0025 Comprehensive Metabolic Panel(!!)  Potassium 6.1 creatinine 5.21  [AG]   0025 Sars-CoV-2 and Influenza A/B PCR  COVID and SARS negative [AG]   0025 Troponin 40 [AG]   0025 White blood count 14,500 hemoglobin 11.7 [AG]   0039 I have explained to the patient that he has acute renal failure and high potassium he states he was supposed to see Dr. Leiva at [facility] for surgery and prefers [facility] at this time we will call the [facility] transfer center [AG]   0042 EKG interpreted by  me sinus tachycardia, right bundle branch block, nonspecific ST-T wave changes, normal axis, no sign of STEMI. [AG]   0045 Patient states that he would rather wait at this time to see if we can get him transferred to [facility].  We notified the [facility] transfer center but they said we would need to call back at 3 AM to see if this patient qualifies to be able to be transferred there [AG]   0050 The patient wrote down the name of the person he wants me to call hilary Anastasiya he gave us permission to discuss his medical situation with this individual.  He provide us with the number and we will call her now [AG]   0050 Channing Gallego is a family friend of the patient and wants me to call her for opinion as to where to be trasferrred [AG]   0051 CT abdomen pelvis wo IV contrast [AG]   0055 Estela gallego stated stated patient has a bad heart.  [AG]   0057 Hilary states try [facility] even if they can't tell me anything until 3am.. [AG]   0059 Patient having no chest pain or chest symptoms  [AG]   0100 CT abdomen pelvis wo IV contrast  Patient has had resolution of ureteral stone, the stone is now in the bladder no hydronephrosis noted [AG]   0101 Solution of left-sided hydronephrosis [AG]   0244 Resting comfortably  no chest pain denies shortness of breath.   [AG]   0245 Saturation is 99% room air. [AG]   0419  I notified [facility] again at 4 AM--as per their recommendation.  We spoke to both the nursing supervisor Rand and Dr. Rosendo Hernandez he states that they cannot take the patient because there are too many patients being boarded in the emergency department.  I will inform both the patient and his special friend Luz Maria Langford--  we calleed her 4:19   no answer   patient aware.  I offered to transfer him to [facility] but he still wants to wait before me making the transfer [AG]   0430 Troponin I, High Sensitivity(!): 40 [AG]   0431 At 4:32 AM Dr. Peralta called me back and stated he can take the patient at [facility] I gave them all the  pertinent results and the history of this patient and Dr. Brady excepted the patient to [facility]. [AG]   0625 I ordered labs for 8am  this patient will be signed over to Dr. Castillo. [AG]      ED Course User Index  [AG] Khoi Bhatt MD         Diagnoses as of 03/30/24 0654   Hyperkalemia   Acute renal failure, unspecified acute renal failure type (CMS/HCC)   Scrotal hernia       Medical Decision Making  I considered serious and nonserious causes for this patient's situation.  He has hyperkalemia and acute renal failure.  There is no evidence of ureteral stone the stone appears to have dropped into the bladder.  This patient wants to be transferred to [facility] and this has been confirmed by the patient's friend Channing Langford whom the patient requested I call and share his information with    Procedure  Procedures none     Khoi Bhatt MD  03/30/24 0433       Khoi Bhatt MD  03/30/24 0433       Khoi Bhatt MD  03/30/24 0654

## 2024-03-30 NOTE — ED PROCEDURE NOTE
Procedure  Critical Care    Performed by: Khoi Bhatt MD  Authorized by: Khoi Bhatt MD    Critical care provider statement:     Critical care time (minutes):  90    Critical care time was exclusive of:  Teaching time and separately billable procedures and treating other patients    Critical care was necessary to treat or prevent imminent or life-threatening deterioration of the following conditions:  Metabolic crisis    Critical care was time spent personally by me on the following activities:  Blood draw for specimens, ordering and review of laboratory studies, ordering and review of radiographic studies, pulse oximetry, re-evaluation of patient's condition, review of old charts, ordering and performing treatments and interventions, evaluation of patient's response to treatment, examination of patient and obtaining history from patient or surrogate               Khoi Bhatt MD  03/30/24 0246

## 2024-03-30 NOTE — ED PROVIDER NOTES
[facility]  Department of Emergency Medicine   ED  Provider Note  3/29/2024 10:09 PM  AC07/AC07                  Iglesia Clarke was signed out by Dr. Bhatt at shift change pending transfer    History of Present Illness:   Iglesia Clarke is a 53 y.o. male presenting to the ED for abdominal pain nausea vomiting, beginning a few weeks ago.  The complaint has been intermittent, mild to moderate in severity, and worsened by nothing.  Patient had a large 9 mm stone in his distal ureter on earlier ER visit.  Presents today for ongoing pain.  He is concerned his stone may still be present.  He has noted to have a increasing renal dysfunction.  His BUN is 130 with a creatinine of 5.2 and a potassium of 6.1.  He has stones in his bladder on today's imaging studies but no stone in the ureter.      Review of Systems:   Pertinent positives and review of systems as noted above.  Remaining 10 review of systems is negative or noncontributory to today's episode of care.        --------------------------------------------- PAST HISTORY ---------------------------------------------  Past Medical History:  has a past medical history of Kidney stones.    Past Surgical History:  has no past surgical history on file.    Social History:  reports that he has never smoked. He has never used smokeless tobacco. He reports that he does not currently use alcohol.    Family History: family history is not on file. Unless otherwise noted, family history is non contributory    The patient’s home medications have been reviewed.    Allergies: Opioids - morphine analogues    -------------------------------------------------- RESULTS -------------------------------------------------  All laboratory and radiology results have been personally reviewed by myself   LABS:  Results for orders placed or performed during the hospital encounter of 03/29/24   Light Blue Top   Result Value Ref Range    Extra Tube Hold for add-ons.    Red Top   Result Value Ref  Range    Extra Tube Hold for add-ons.    Green Top   Result Value Ref Range    Extra Tube Hold for add-ons.    Lavender Top   Result Value Ref Range    Extra Tube Hold for add-ons.    Gray Top   Result Value Ref Range    Extra Tube Hold for add-ons.    CBC and Auto Differential   Result Value Ref Range    WBC 14.5 (H) 4.4 - 11.3 x10*3/uL    nRBC 0.0 0.0 - 0.0 /100 WBCs    RBC 3.62 (L) 4.50 - 5.90 x10*6/uL    Hemoglobin 11.7 (L) 13.5 - 17.5 g/dL    Hematocrit 36.8 (L) 41.0 - 52.0 %     (H) 80 - 100 fL    MCH 32.3 26.0 - 34.0 pg    MCHC 31.8 (L) 32.0 - 36.0 g/dL    RDW 14.8 (H) 11.5 - 14.5 %    Platelets 517 (H) 150 - 450 x10*3/uL    Neutrophils % 77.2 40.0 - 80.0 %    Immature Granulocytes %, Automated 0.5 0.0 - 0.9 %    Lymphocytes % 12.0 13.0 - 44.0 %    Monocytes % 9.5 2.0 - 10.0 %    Eosinophils % 0.6 0.0 - 6.0 %    Basophils % 0.2 0.0 - 2.0 %    Neutrophils Absolute 11.22 (H) 1.20 - 7.70 x10*3/uL    Immature Granulocytes Absolute, Automated 0.07 0.00 - 0.70 x10*3/uL    Lymphocytes Absolute 1.74 1.20 - 4.80 x10*3/uL    Monocytes Absolute 1.38 (H) 0.10 - 1.00 x10*3/uL    Eosinophils Absolute 0.09 0.00 - 0.70 x10*3/uL    Basophils Absolute 0.03 0.00 - 0.10 x10*3/uL   Comprehensive Metabolic Panel   Result Value Ref Range    Glucose 124 (H) 74 - 99 mg/dL    Sodium 131 (L) 136 - 145 mmol/L    Potassium 6.1 (HH) 3.5 - 5.3 mmol/L    Chloride 111 (H) 98 - 107 mmol/L    Bicarbonate 7 (LL) 21 - 32 mmol/L    Anion Gap 19 10 - 20 mmol/L    Urea Nitrogen 138 (HH) 6 - 23 mg/dL    Creatinine 5.21 (H) 0.50 - 1.30 mg/dL    eGFR 12 (L) >60 mL/min/1.73m*2    Calcium 10.1 8.6 - 10.3 mg/dL    Albumin 4.9 3.4 - 5.0 g/dL    Alkaline Phosphatase 58 33 - 120 U/L    Total Protein 9.1 (H) 6.4 - 8.2 g/dL    AST 8 (L) 9 - 39 U/L    Bilirubin, Total 0.3 0.0 - 1.2 mg/dL    ALT 9 (L) 10 - 52 U/L   Magnesium   Result Value Ref Range    Magnesium 2.26 1.60 - 2.40 mg/dL   Troponin I, High Sensitivity, Initial   Result Value Ref Range     Troponin I, High Sensitivity 40 (H) 0 - 20 ng/L   Sars-CoV-2 and Influenza A/B PCR   Result Value Ref Range    Flu A Result Not Detected Not Detected    Flu B Result Not Detected Not Detected    Coronavirus 2019, PCR Not Detected Not Detected   Troponin, High Sensitivity, 1 Hour   Result Value Ref Range    Troponin I, High Sensitivity 39 (H) 0 - 20 ng/L   Comprehensive metabolic panel   Result Value Ref Range    Glucose 112 (H) 74 - 99 mg/dL    Sodium 135 (L) 136 - 145 mmol/L    Potassium 6.1 (HH) 3.5 - 5.3 mmol/L    Chloride 115 (H) 98 - 107 mmol/L    Bicarbonate 7 (LL) 21 - 32 mmol/L    Anion Gap 19 10 - 20 mmol/L    Urea Nitrogen 130 (HH) 6 - 23 mg/dL    Creatinine 4.75 (H) 0.50 - 1.30 mg/dL    eGFR 14 (L) >60 mL/min/1.73m*2    Calcium 9.8 8.6 - 10.3 mg/dL    Albumin 4.1 3.4 - 5.0 g/dL    Alkaline Phosphatase 50 33 - 120 U/L    Total Protein 7.7 6.4 - 8.2 g/dL    AST 7 (L) 9 - 39 U/L    Bilirubin, Total 0.3 0.0 - 1.2 mg/dL    ALT 7 (L) 10 - 52 U/L   POCT GLUCOSE   Result Value Ref Range    POCT Glucose 161 (H) 74 - 99 mg/dL     EKG: March 30 11:19 AM sinus rhythm with right bundle branch block, inferolateral T wave abnormalities, no acute ST elevations.  Unchanged from earlier EKG except for rate.  Interpreted by HERNAN Castillo MD   RADIOLOGY:  Interpreted by Radiologist.  CT head wo IV contrast   Final Result   Mild skull base arterial calcifications as described.        Remainder of the exam was negative.        MACRO:   None        Signed by: Khoi Urbina 3/30/2024 8:35 AM   Dictation workstation:   [id number]      CT abdomen pelvis wo IV contrast   Final Result   1.  Left distal ureteral stone has passed distally now terminates in   the urinary bladder. Resolution of left-sided hydronephrosis.   Unchanged left-sided nephroliths as well as pencil [place] enlargement   on the left and atrophic right kidney.   2. Hepatic steatosis.   3. Diverticulosis.   4. Very large left inguinal hernia containing fat and bowel  without   CT evidence suggestive of obstruction             MACRO:   None        Signed by: Cayden James 3/30/2024 12:51 AM   Dictation workstation:   [id number]      XR chest 1 view   Final Result   1. No acute cardiopulmonary process.        Signed by: Jay Jay Rizzo 3/30/2024 12:50 AM   Dictation workstation:   [id number]      US scrotum w doppler   Final Result   Large hernia in the left scrotum with herniation of bowel and omental   fat. The left testis is deviated medially and inferiorly secondary to   mass effect from the large hernia.        No sonographic evidence of testicular mass or torsion.        MACRO:   None        Signed by: Miguel Carbajal 3/30/2024 12:03 AM   Dictation workstation:   [id number]          Encounter Date: 03/15/24   ECG 12 lead   Result Value    Ventricular Rate 108    Atrial Rate 108    MS Interval 180    QRS Duration 140    QT Interval 352    QTC Calculation(Bazett) 471    P Axis 41    R Axis -10    T Axis 74    QRS Count 17    Q Onset 220    P Onset 130    P Offset 182    T Offset 396    QTC Fredericia 428    Narrative    Sinus tachycardia  Right bundle branch block  Abnormal ECG  When compared with ECG of 09-FEB-2023 08:47,  Previous ECG has undetermined rhythm, needs review  T wave inversion less evident in Anterior leads  See ED provider note for full interpretation and clinical correlation  Confirmed by Sharita Mckee ([id number]) on 3/20/2024 2:58:48 PM     ------------------------- NURSING NOTES AND VITALS REVIEWED ---------------------------   The nursing notes within the ED encounter and vital signs as below have been reviewed.   @VS  Oxygen Saturation Interpretation: Normal      ------------------------------ ED COURSE/MEDICAL DECISION MAKING----------------------  Clinical course:  Patient found to be in acute worsening renal failure.  He will be admitted to [facility] for further care and treatment.    Medical Decision Making:   Transfer when bed is  available    Counseling:   The emergency provider has spoken with the patient and discussed today’s results, in addition to providing specific details for the plan of care and counseling regarding the diagnosis and prognosis.  Questions are answered at this time and they are agreeable with the plan.      --------------------------------- IMPRESSION AND DISPOSITION ---------------------------------    IMPRESSION  1. Hyperkalemia    2. Acute renal failure, unspecified acute renal failure type (CMS/[facility])    3. Scrotal hernia        DISPOSITION  Disposition: Transfer to [facility] for further care and treatment  Patient condition is guarded       Jluis Castillo MD  03/30/24 [id number]       Jluis Castillo MD  03/30/24 6310

## 2024-03-30 NOTE — ED NOTES
Patient presents via squad from home with complaints of weakness and back pain. Patient appears tired at time of triage. He also states he has heartburn-like chest pain that has been off and on for weeks. Patient apparently has a kidney stone that he is being worked up for as well     Hector Cardozo RN  03/29/24 6418

## 2024-04-02 LAB
ATRIAL RATE: 103 BPM
ATRIAL RATE: 98 BPM
P AXIS: 66 DEGREES
P AXIS: 71 DEGREES
P OFFSET: 160 MS
P OFFSET: 187 MS
P ONSET: 129 MS
P ONSET: 134 MS
PR INTERVAL: 172 MS
PR INTERVAL: 180 MS
Q ONSET: 219 MS
Q ONSET: 220 MS
QRS COUNT: 16 BEATS
QRS COUNT: 17 BEATS
QRS DURATION: 130 MS
QRS DURATION: 138 MS
QT INTERVAL: 366 MS
QT INTERVAL: 368 MS
QTC CALCULATION(BAZETT): 469 MS
QTC CALCULATION(BAZETT): 479 MS
QTC FREDERICIA: 433 MS
QTC FREDERICIA: 438 MS
R AXIS: 18 DEGREES
R AXIS: 30 DEGREES
T AXIS: 114 DEGREES
T AXIS: 116 DEGREES
T OFFSET: 402 MS
T OFFSET: 404 MS
VENTRICULAR RATE: 103 BPM
VENTRICULAR RATE: 98 BPM

## 2024-07-19 ENCOUNTER — HOSPITAL ENCOUNTER (EMERGENCY)
Facility: HOSPITAL | Age: 54
Discharge: OTHER INSTITUTION | End: 2024-07-19
Attending: FAMILY MEDICINE
Payer: MEDICARE

## 2024-07-19 ENCOUNTER — HOSPITAL ENCOUNTER (OUTPATIENT)
Dept: RADIOLOGY | Facility: HOSPITAL | Age: 54
Discharge: HOME | End: 2024-07-19
Payer: MEDICARE

## 2024-07-19 ENCOUNTER — HOSPITAL ENCOUNTER (INPATIENT)
Facility: HOSPITAL | Age: 54
LOS: 2 days | Discharge: HOME | DRG: 291 | End: 2024-07-21
Attending: INTERNAL MEDICINE | Admitting: INTERNAL MEDICINE
Payer: MEDICARE

## 2024-07-19 ENCOUNTER — HOSPITAL ENCOUNTER (OUTPATIENT)
Dept: CARDIOLOGY | Facility: HOSPITAL | Age: 54
Discharge: HOME | End: 2024-07-19
Payer: MEDICARE

## 2024-07-19 ENCOUNTER — APPOINTMENT (OUTPATIENT)
Dept: CARDIOLOGY | Facility: HOSPITAL | Age: 54
End: 2024-07-19
Payer: MEDICARE

## 2024-07-19 ENCOUNTER — LAB (OUTPATIENT)
Dept: LAB | Facility: LAB | Age: 54
End: 2024-07-19
Payer: MEDICARE

## 2024-07-19 VITALS
BODY MASS INDEX: 35.65 KG/M2 | WEIGHT: 263.23 LBS | RESPIRATION RATE: 31 BRPM | SYSTOLIC BLOOD PRESSURE: 161 MMHG | HEIGHT: 72 IN | OXYGEN SATURATION: 91 % | DIASTOLIC BLOOD PRESSURE: 95 MMHG | HEART RATE: 89 BPM | TEMPERATURE: 98.3 F

## 2024-07-19 DIAGNOSIS — R19.8 ABDOMEN FIRM ON PALPATION: Primary | ICD-10-CM

## 2024-07-19 DIAGNOSIS — I50.30 DIASTOLIC CONGESTIVE HEART FAILURE, UNSPECIFIED HF CHRONICITY (MULTI): ICD-10-CM

## 2024-07-19 DIAGNOSIS — N18.2 STAGE 2 CHRONIC KIDNEY DISEASE: ICD-10-CM

## 2024-07-19 DIAGNOSIS — R06.00 DYSPNEA: ICD-10-CM

## 2024-07-19 DIAGNOSIS — I10 PRIMARY HYPERTENSION: ICD-10-CM

## 2024-07-19 DIAGNOSIS — I50.23 ACUTE ON CHRONIC SYSTOLIC HEART FAILURE (MULTI): ICD-10-CM

## 2024-07-19 DIAGNOSIS — R79.89 ELEVATED D-DIMER: ICD-10-CM

## 2024-07-19 DIAGNOSIS — R79.89 D-DIMER, ELEVATED: ICD-10-CM

## 2024-07-19 DIAGNOSIS — I21.4 NSTEMI (NON-ST ELEVATED MYOCARDIAL INFARCTION) (MULTI): Primary | ICD-10-CM

## 2024-07-19 DIAGNOSIS — N50.82 SCROTUM PAIN: ICD-10-CM

## 2024-07-19 DIAGNOSIS — G62.9 NEUROPATHY: ICD-10-CM

## 2024-07-19 LAB
ALBUMIN SERPL BCP-MCNC: 3.4 G/DL (ref 3.4–5)
ALP SERPL-CCNC: 56 U/L (ref 33–120)
ALT SERPL W P-5'-P-CCNC: 17 U/L (ref 10–52)
ANION GAP SERPL CALC-SCNC: 11 MMOL/L (ref 10–20)
APPEARANCE UR: CLEAR
APTT PPP: 35 SECONDS (ref 27–38)
AST SERPL W P-5'-P-CCNC: 17 U/L (ref 9–39)
BASOPHILS # BLD AUTO: 0.07 X10*3/UL (ref 0–0.1)
BASOPHILS NFR BLD AUTO: 0.8 %
BILIRUB SERPL-MCNC: 0.3 MG/DL (ref 0–1.2)
BILIRUB UR STRIP.AUTO-MCNC: NEGATIVE MG/DL
BNP SERPL-MCNC: 2172 PG/ML (ref 0–99)
BUN SERPL-MCNC: 31 MG/DL (ref 6–23)
CALCIUM SERPL-MCNC: 8.3 MG/DL (ref 8.6–10.3)
CARDIAC TROPONIN I PNL SERPL HS: 115 NG/L (ref 0–20)
CARDIAC TROPONIN I PNL SERPL HS: 129 NG/L (ref 0–20)
CHLORIDE SERPL-SCNC: 106 MMOL/L (ref 98–107)
CO2 SERPL-SCNC: 24 MMOL/L (ref 21–32)
COLOR UR: YELLOW
CREAT SERPL-MCNC: 2.32 MG/DL (ref 0.5–1.3)
D DIMER PPP FEU-MCNC: 521 NG/ML FEU
EGFRCR SERPLBLD CKD-EPI 2021: 33 ML/MIN/1.73M*2
EOSINOPHIL # BLD AUTO: 0.06 X10*3/UL (ref 0–0.7)
EOSINOPHIL NFR BLD AUTO: 0.7 %
ERYTHROCYTE [DISTWIDTH] IN BLOOD BY AUTOMATED COUNT: 13.7 % (ref 11.5–14.5)
GLUCOSE SERPL-MCNC: 122 MG/DL (ref 74–99)
GLUCOSE UR STRIP.AUTO-MCNC: NEGATIVE MG/DL
HCT VFR BLD AUTO: 39.8 % (ref 41–52)
HGB BLD-MCNC: 13 G/DL (ref 13.5–17.5)
HOLD SPECIMEN: NORMAL
HYALINE CASTS #/AREA URNS AUTO: ABNORMAL /LPF
IMM GRANULOCYTES # BLD AUTO: 0.02 X10*3/UL (ref 0–0.7)
IMM GRANULOCYTES NFR BLD AUTO: 0.2 % (ref 0–0.9)
KETONES UR STRIP.AUTO-MCNC: NEGATIVE MG/DL
LEUKOCYTE ESTERASE UR QL STRIP.AUTO: NEGATIVE
LYMPHOCYTES # BLD AUTO: 1.11 X10*3/UL (ref 1.2–4.8)
LYMPHOCYTES NFR BLD AUTO: 12.7 %
MAGNESIUM SERPL-MCNC: 2.03 MG/DL (ref 1.6–2.4)
MCH RBC QN AUTO: 30.6 PG (ref 26–34)
MCHC RBC AUTO-ENTMCNC: 32.7 G/DL (ref 32–36)
MCV RBC AUTO: 94 FL (ref 80–100)
MONOCYTES # BLD AUTO: 0.71 X10*3/UL (ref 0.1–1)
MONOCYTES NFR BLD AUTO: 8.1 %
NEUTROPHILS # BLD AUTO: 6.77 X10*3/UL (ref 1.2–7.7)
NEUTROPHILS NFR BLD AUTO: 77.5 %
NITRITE UR QL STRIP.AUTO: NEGATIVE
NRBC BLD-RTO: 0 /100 WBCS (ref 0–0)
PH UR STRIP.AUTO: 5 [PH]
PLATELET # BLD AUTO: 328 X10*3/UL (ref 150–450)
POTASSIUM SERPL-SCNC: 3.7 MMOL/L (ref 3.5–5.3)
PROT SERPL-MCNC: 6 G/DL (ref 6.4–8.2)
PROT UR STRIP.AUTO-MCNC: ABNORMAL MG/DL
RBC # BLD AUTO: 4.25 X10*6/UL (ref 4.5–5.9)
RBC # UR STRIP.AUTO: NEGATIVE /UL
RBC #/AREA URNS AUTO: ABNORMAL /HPF
SODIUM SERPL-SCNC: 137 MMOL/L (ref 136–145)
SP GR UR STRIP.AUTO: 1.03
TSH SERPL-ACNC: 1.01 MIU/L (ref 0.44–3.98)
UFH PPP CHRO-ACNC: 0.1 IU/ML
URATE SERPL-MCNC: 6 MG/DL (ref 4–7.5)
UROBILINOGEN UR STRIP.AUTO-MCNC: <2 MG/DL
WBC # BLD AUTO: 8.7 X10*3/UL (ref 4.4–11.3)
WBC #/AREA URNS AUTO: ABNORMAL /HPF

## 2024-07-19 PROCEDURE — 85730 THROMBOPLASTIN TIME PARTIAL: CPT | Performed by: FAMILY MEDICINE

## 2024-07-19 PROCEDURE — 2500000004 HC RX 250 GENERAL PHARMACY W/ HCPCS (ALT 636 FOR OP/ED): Mod: TB

## 2024-07-19 PROCEDURE — 99291 CRITICAL CARE FIRST HOUR: CPT | Mod: 25 | Performed by: FAMILY MEDICINE

## 2024-07-19 PROCEDURE — 93005 ELECTROCARDIOGRAM TRACING: CPT

## 2024-07-19 PROCEDURE — 84550 ASSAY OF BLOOD/URIC ACID: CPT

## 2024-07-19 PROCEDURE — 84484 ASSAY OF TROPONIN QUANT: CPT

## 2024-07-19 PROCEDURE — 85520 HEPARIN ASSAY: CPT | Performed by: FAMILY MEDICINE

## 2024-07-19 PROCEDURE — 2500000005 HC RX 250 GENERAL PHARMACY W/O HCPCS: Performed by: FAMILY MEDICINE

## 2024-07-19 PROCEDURE — 80053 COMPREHEN METABOLIC PANEL: CPT

## 2024-07-19 PROCEDURE — 76870 US EXAM SCROTUM: CPT

## 2024-07-19 PROCEDURE — 2500000004 HC RX 250 GENERAL PHARMACY W/ HCPCS (ALT 636 FOR OP/ED): Performed by: FAMILY MEDICINE

## 2024-07-19 PROCEDURE — 85379 FIBRIN DEGRADATION QUANT: CPT | Performed by: FAMILY MEDICINE

## 2024-07-19 PROCEDURE — 71046 X-RAY EXAM CHEST 2 VIEWS: CPT

## 2024-07-19 PROCEDURE — 83735 ASSAY OF MAGNESIUM: CPT

## 2024-07-19 PROCEDURE — 84484 ASSAY OF TROPONIN QUANT: CPT | Performed by: FAMILY MEDICINE

## 2024-07-19 PROCEDURE — 96374 THER/PROPH/DIAG INJ IV PUSH: CPT | Mod: 59

## 2024-07-19 PROCEDURE — 85025 COMPLETE CBC W/AUTO DIFF WBC: CPT

## 2024-07-19 PROCEDURE — 85520 HEPARIN ASSAY: CPT | Performed by: INTERNAL MEDICINE

## 2024-07-19 PROCEDURE — 99285 EMERGENCY DEPT VISIT HI MDM: CPT | Mod: 25

## 2024-07-19 PROCEDURE — 96375 TX/PRO/DX INJ NEW DRUG ADDON: CPT

## 2024-07-19 PROCEDURE — 1100000001 HC PRIVATE ROOM DAILY

## 2024-07-19 PROCEDURE — 36415 COLL VENOUS BLD VENIPUNCTURE: CPT | Performed by: FAMILY MEDICINE

## 2024-07-19 PROCEDURE — 87040 BLOOD CULTURE FOR BACTERIA: CPT | Mod: CONLAB | Performed by: FAMILY MEDICINE

## 2024-07-19 PROCEDURE — 81001 URINALYSIS AUTO W/SCOPE: CPT

## 2024-07-19 PROCEDURE — 83880 ASSAY OF NATRIURETIC PEPTIDE: CPT

## 2024-07-19 PROCEDURE — 84443 ASSAY THYROID STIM HORMONE: CPT

## 2024-07-19 RX ORDER — LABETALOL HYDROCHLORIDE 5 MG/ML
20 INJECTION, SOLUTION INTRAVENOUS ONCE
Status: COMPLETED | OUTPATIENT
Start: 2024-07-19 | End: 2024-07-19

## 2024-07-19 RX ORDER — HYDRALAZINE HYDROCHLORIDE 20 MG/ML
INJECTION INTRAMUSCULAR; INTRAVENOUS
Status: COMPLETED
Start: 2024-07-19 | End: 2024-07-19

## 2024-07-19 RX ORDER — HYDRALAZINE HYDROCHLORIDE 20 MG/ML
20 INJECTION INTRAMUSCULAR; INTRAVENOUS ONCE
Status: COMPLETED | OUTPATIENT
Start: 2024-07-19 | End: 2024-07-19

## 2024-07-19 RX ORDER — FUROSEMIDE 10 MG/ML
INJECTION INTRAMUSCULAR; INTRAVENOUS
Status: COMPLETED
Start: 2024-07-19 | End: 2024-07-19

## 2024-07-19 RX ORDER — HEPARIN SODIUM 10000 [USP'U]/100ML
0-4000 INJECTION, SOLUTION INTRAVENOUS CONTINUOUS
Status: DISCONTINUED | OUTPATIENT
Start: 2024-07-19 | End: 2024-07-19 | Stop reason: HOSPADM

## 2024-07-19 RX ORDER — LORAZEPAM 2 MG/ML
1 INJECTION INTRAMUSCULAR ONCE
Status: COMPLETED | OUTPATIENT
Start: 2024-07-19 | End: 2024-07-19

## 2024-07-19 RX ORDER — ACETAMINOPHEN 500 MG
500 TABLET ORAL EVERY 6 HOURS PRN
COMMUNITY

## 2024-07-19 RX ORDER — FUROSEMIDE 10 MG/ML
20 INJECTION INTRAMUSCULAR; INTRAVENOUS ONCE
Status: COMPLETED | OUTPATIENT
Start: 2024-07-19 | End: 2024-07-19

## 2024-07-19 RX ORDER — LABETALOL HYDROCHLORIDE 5 MG/ML
INJECTION, SOLUTION INTRAVENOUS
Status: COMPLETED
Start: 2024-07-19 | End: 2024-07-19

## 2024-07-19 RX ORDER — LORAZEPAM 2 MG/ML
INJECTION INTRAMUSCULAR
Status: COMPLETED
Start: 2024-07-19 | End: 2024-07-19

## 2024-07-19 SDOH — SOCIAL STABILITY: SOCIAL INSECURITY: DO YOU FEEL UNSAFE GOING BACK TO THE PLACE WHERE YOU ARE LIVING?: NO

## 2024-07-19 SDOH — SOCIAL STABILITY: SOCIAL INSECURITY: HAVE YOU HAD THOUGHTS OF HARMING ANYONE ELSE?: NO

## 2024-07-19 SDOH — SOCIAL STABILITY: SOCIAL INSECURITY: WERE YOU ABLE TO COMPLETE ALL THE BEHAVIORAL HEALTH SCREENINGS?: YES

## 2024-07-19 SDOH — SOCIAL STABILITY: SOCIAL INSECURITY: ARE YOU OR HAVE YOU BEEN THREATENED OR ABUSED PHYSICALLY, EMOTIONALLY, OR SEXUALLY BY ANYONE?: NO

## 2024-07-19 SDOH — SOCIAL STABILITY: SOCIAL INSECURITY: DOES ANYONE TRY TO KEEP YOU FROM HAVING/CONTACTING OTHER FRIENDS OR DOING THINGS OUTSIDE YOUR HOME?: NO

## 2024-07-19 SDOH — SOCIAL STABILITY: SOCIAL INSECURITY: DO YOU FEEL ANYONE HAS EXPLOITED OR TAKEN ADVANTAGE OF YOU FINANCIALLY OR OF YOUR PERSONAL PROPERTY?: NO

## 2024-07-19 SDOH — SOCIAL STABILITY: SOCIAL INSECURITY: HAS ANYONE EVER THREATENED TO HURT YOUR FAMILY OR YOUR PETS?: NO

## 2024-07-19 SDOH — SOCIAL STABILITY: SOCIAL INSECURITY: ARE THERE ANY APPARENT SIGNS OF INJURIES/BEHAVIORS THAT COULD BE RELATED TO ABUSE/NEGLECT?: NO

## 2024-07-19 SDOH — SOCIAL STABILITY: SOCIAL INSECURITY: ABUSE: ADULT

## 2024-07-19 ASSESSMENT — ACTIVITIES OF DAILY LIVING (ADL)
FEEDING YOURSELF: INDEPENDENT
DRESSING YOURSELF: INDEPENDENT
JUDGMENT_ADEQUATE_SAFELY_COMPLETE_DAILY_ACTIVITIES: YES
TOILETING: INDEPENDENT
PATIENT'S MEMORY ADEQUATE TO SAFELY COMPLETE DAILY ACTIVITIES?: YES
WALKS IN HOME: INDEPENDENT
GROOMING: INDEPENDENT
HEARING - LEFT EAR: FUNCTIONAL
BATHING: INDEPENDENT
HEARING - RIGHT EAR: FUNCTIONAL
ADEQUATE_TO_COMPLETE_ADL: YES
LACK_OF_TRANSPORTATION: NO

## 2024-07-19 ASSESSMENT — COGNITIVE AND FUNCTIONAL STATUS - GENERAL
PATIENT BASELINE BEDBOUND: NO
DRESSING REGULAR LOWER BODY CLOTHING: A LITTLE
TOILETING: A LITTLE
WALKING IN HOSPITAL ROOM: A LITTLE
HELP NEEDED FOR BATHING: A LITTLE
MOBILITY SCORE: 22
DAILY ACTIVITIY SCORE: 21
CLIMB 3 TO 5 STEPS WITH RAILING: A LITTLE

## 2024-07-19 ASSESSMENT — COLUMBIA-SUICIDE SEVERITY RATING SCALE - C-SSRS
6. HAVE YOU EVER DONE ANYTHING, STARTED TO DO ANYTHING, OR PREPARED TO DO ANYTHING TO END YOUR LIFE?: NO
1. IN THE PAST MONTH, HAVE YOU WISHED YOU WERE DEAD OR WISHED YOU COULD GO TO SLEEP AND NOT WAKE UP?: NO
2. HAVE YOU ACTUALLY HAD ANY THOUGHTS OF KILLING YOURSELF?: NO
2. HAVE YOU ACTUALLY HAD ANY THOUGHTS OF KILLING YOURSELF?: NO
6. HAVE YOU EVER DONE ANYTHING, STARTED TO DO ANYTHING, OR PREPARED TO DO ANYTHING TO END YOUR LIFE?: NO
1. IN THE PAST MONTH, HAVE YOU WISHED YOU WERE DEAD OR WISHED YOU COULD GO TO SLEEP AND NOT WAKE UP?: NO

## 2024-07-19 ASSESSMENT — PAIN SCALES - GENERAL
PAINLEVEL_OUTOF10: 0 - NO PAIN

## 2024-07-19 ASSESSMENT — LIFESTYLE VARIABLES
SUBSTANCE_ABUSE_PAST_12_MONTHS: NO
AUDIT-C TOTAL SCORE: 0
SKIP TO QUESTIONS 9-10: 1
HOW OFTEN DO YOU HAVE A DRINK CONTAINING ALCOHOL: NEVER
PRESCIPTION_ABUSE_PAST_12_MONTHS: NO
HOW MANY STANDARD DRINKS CONTAINING ALCOHOL DO YOU HAVE ON A TYPICAL DAY: PATIENT DOES NOT DRINK
AUDIT-C TOTAL SCORE: 0
HOW OFTEN DO YOU HAVE 6 OR MORE DRINKS ON ONE OCCASION: NEVER

## 2024-07-19 ASSESSMENT — PATIENT HEALTH QUESTIONNAIRE - PHQ9
1. LITTLE INTEREST OR PLEASURE IN DOING THINGS: NOT AT ALL
SUM OF ALL RESPONSES TO PHQ9 QUESTIONS 1 & 2: 0
2. FEELING DOWN, DEPRESSED OR HOPELESS: NOT AT ALL

## 2024-07-19 ASSESSMENT — PAIN - FUNCTIONAL ASSESSMENT
PAIN_FUNCTIONAL_ASSESSMENT: 0-10
PAIN_FUNCTIONAL_ASSESSMENT: 0-10

## 2024-07-19 NOTE — ED PROVIDER NOTES
HPI   Chief Complaint   Patient presents with    Shortness of Breath     From radiology for shortness of breathe, leg swelling.        HPI  This 53-year-old male patient reported emergency room with a complaint of leg swelling weakness and chest x-ray showed pulmonary edema when he was in radiology department patient was feeling weak and lethargic he was brought to the ER for evaluation from radiology department.  Patient arrived for outpatient x-ray however at arrival he laid on the lobby due to weakness and did not have neurology to get up and walk.  He denies any chest pain short of breath at that time he refused to be seen evaluated in the ER went to radiology department however in radiology department he subsequently agreed to come in as he was still able to ambulate ambulate due to weakness and fatigue and swelling the legs.  He denies any chest pain abdominal pain vomiting or diarrhea.  He denies fall or hitting his head on the floor blacked out or pass out.  Is not taking blood thinner.  Family history: Reviewed  Social history: Reviewed, denies substance abuse.  Review of system: 10 review of system obtained review of system as In HPI otherwise negative.    Patient History   Past Medical History:   Diagnosis Date    Kidney stones      No past surgical history on file.  No family history on file.  Social History     Tobacco Use    Smoking status: Never    Smokeless tobacco: Never   Substance Use Topics    Alcohol use: Not Currently    Drug use: Not on file     EKG DONE AT 1349 showed sinus tachycardia with occasional PVCs.  Left atrial enlargement right bundle branch block, inferior infarct age undetermined.  Lateral ischemia, no ST-T evaluation.  No STEMI.      Second EKG done at 1515 hrs. showed sECOND EKG AT 1515 showed sinus rhythm with pac pvc, left atrila enlargement RBBB INFLATERAL ISHEMIA NO STEMI ABNORMAL EKG    3RD EKG AT 1621 SHOWED SINUS RHYTHM pac, RBBB, INF LATERAL ISCHEMIA  Physical Exam   ED  Triage Vitals   Temp Pulse Resp BP   -- -- -- --      SpO2 Temp src Heart Rate Source Patient Position   -- -- -- --      BP Location FiO2 (%)     -- --       Physical Exam  Constitutional:       Appearance: Normal appearance.      Comments: Dyspneic pale  mild tachypnea, reluctant to be evaluated in the ER but agreed to be seen due to presenting symptom.  Awake alert oriented x 3.  Noted to have peripheral edema noted pitting peripheral edema.  Calf is nontender.  Able to move his arms leg no facial drooping or drooling stridor noted neck is supple.  Generalized fatigue and weakness noted he was in sinus rhythm on the monitor.   HENT:      Head: Normocephalic and atraumatic.      Right Ear: External ear normal.      Left Ear: External ear normal.      Nose: Nose normal. No congestion or rhinorrhea.   Eyes:      Extraocular Movements: Extraocular movements intact.      Conjunctiva/sclera: Conjunctivae normal.      Pupils: Pupils are equal, round, and reactive to light.   Cardiovascular:      Rate and Rhythm: Normal rate and regular rhythm.      Pulses: Normal pulses.      Heart sounds: Normal heart sounds.      Comments: Regular rate and rhythm.  No friction rub no JVD however he was noted peripheral edema calf is soft nontender.  Pulmonary:      Effort: Pulmonary effort is normal.      Breath sounds: Normal breath sounds.      Comments: On auscultation patient coarse rhonchi and rales bilaterally.  No tachypnea hypoxemia respite distress noted peripheral edema noted calf is nontender.  Intact distal pulse intact sensation.  Abdominal:      General: Abdomen is flat. Bowel sounds are normal.      Palpations: Abdomen is soft.      Comments: Abdomen obese soft positive bowel sound nontender no guarding rebound surgery.  I could not elicit any CVA   Musculoskeletal:      Cervical back: Normal range of motion and neck supple.      Comments: Peripheral edema otherwise good motion arms leg calf is nontender Homans' sign  negative spine nontender neck was supple.   Skin:     General: Skin is warm.      Capillary Refill: Capillary refill takes less than 2 seconds.      Comments: Peripheral edema otherwise no petechiae ecchymosis Nontender Homans' Sign Negative.  No Cyanosis.   Neurological:      General: No focal deficit present.      Mental Status: He is alert and oriented to person, place, and time.      Comments: No facial drooping or drooling no stridor.  Neck is supple.  Able to move arms and legs no arms or leg drift noted symmetrical strength and range of motion both upper lower extremity.  Limited neuro examination normal neck is supple and some adjustments.  No speech impairment   Psychiatric:         Mood and Affect: Mood normal.         Behavior: Behavior normal.           ED Course & MDM   Diagnoses as of 08/07/24 0608   NSTEMI (non-ST elevated myocardial infarction) (Multi)   Diastolic congestive heart failure, unspecified HF chronicity (Multi)   Primary hypertension   Elevated d-dimer   Stage 2 chronic kidney disease     This 53-year-old male patient reported emergency room with a complaint of leg swelling weakness and chest x-ray showed pulmonary edema when he was in radiology department patient was feeling weak and lethargic he was brought to the ER for evaluation from radiology department.  Patient arrived for outpatient x-ray however at arrival he laid on the lobby due to weakness and did not have neurology to get up and walk.  He denies any chest pain short of breath at that time he refused to be seen evaluated in the ER went to radiology department however in radiology department he subsequently agreed to come in as he was still able to ambulate ambulate due to weakness and fatigue and swelling the legs.  He denies any chest pain abdominal pain vomiting or diarrhea.  He denies fall or hitting his head on the floor blacked out or pass out.  Is not taking blood thinner.  Family history: Reviewed     Patient was noted  obvious edema pitting edema lower extremity crackles and coarse rhonchi as well as rales bilaterally he was not feeling well.  Generalized fatigue and weakness noted but no isolated motor or sensory deficit.  Neck is supple heart regular rate and rhythm vascular abdomen soft nontender no guarding rebound surgery.    He looks acutely sick his EKG showed no ST-T evaluation but he has abnormal EKG noted to have tachycardia on the EKG.  Right bundle branch block.  Inferior lateral ischemia.  No ST-T evaluation.  Repeat troponin was 120 9 repeat EKG shows similar findings as documented above.  D-dimer is 521.  His BNP is 2172.  Magnesium 2.3.  Patient troponin was 115,  Patient was started on IV heparin and Brilinta Case discussed with Dr. Persaud he agreed with heparin drip and Brilinta and transferred to [facility].  Patient transfer accepted by Dr. Persaud who they will obtain cardiology consult.    It was difficult to convince patient to be admitted as he did not want to be admitted given to come to ER repeatedly refused and I had to convince him to have evaluation and workup and treatment done and finally and had to convince him to that he needs to be admitted as he goes home he may end up with cardiopulmonary arrest death or permanent disability.  He agreed to be transferred to [facility].  Difficult uncooperative patient but subsequently agreed to be admitted    07/19/24 1407       Faustina Monreal MD  07/19/24 1514       Faustina Monreal MD  07/19/24 1559       Faustina Monreal MD  07/19/24 1639       Faustina Monreal MD  08/07/24 0651       Faustina Monreal MD  08/07/24 0652

## 2024-07-20 ENCOUNTER — APPOINTMENT (OUTPATIENT)
Dept: RADIOLOGY | Facility: HOSPITAL | Age: 54
DRG: 291 | End: 2024-07-20
Payer: MEDICARE

## 2024-07-20 ENCOUNTER — APPOINTMENT (OUTPATIENT)
Dept: CARDIOLOGY | Facility: HOSPITAL | Age: 54
DRG: 291 | End: 2024-07-20
Payer: MEDICARE

## 2024-07-20 LAB
ALBUMIN SERPL BCP-MCNC: 3.3 G/DL (ref 3.4–5)
ALP SERPL-CCNC: 60 U/L (ref 33–120)
ALT SERPL W P-5'-P-CCNC: 15 U/L (ref 10–52)
ANION GAP BLDV CALCULATED.4IONS-SCNC: 5 MMOL/L (ref 10–25)
ANION GAP BLDV CALCULATED.4IONS-SCNC: 8 MMOL/L (ref 10–25)
ANION GAP SERPL CALC-SCNC: 12 MMOL/L (ref 10–20)
APTT PPP: 47 SECONDS (ref 27–38)
AST SERPL W P-5'-P-CCNC: 14 U/L (ref 9–39)
BASE EXCESS BLDV CALC-SCNC: -0.3 MMOL/L (ref -2–3)
BASE EXCESS BLDV CALC-SCNC: 1 MMOL/L (ref -2–3)
BILIRUB SERPL-MCNC: 0.3 MG/DL (ref 0–1.2)
BODY TEMPERATURE: ABNORMAL
BODY TEMPERATURE: ABNORMAL
BUN SERPL-MCNC: 33 MG/DL (ref 6–23)
CA-I BLDV-SCNC: 1.12 MMOL/L (ref 1.1–1.33)
CA-I BLDV-SCNC: 1.18 MMOL/L (ref 1.1–1.33)
CALCIUM SERPL-MCNC: 8.6 MG/DL (ref 8.6–10.3)
CARDIAC TROPONIN I PNL SERPL HS: 123 NG/L (ref 0–20)
CARDIAC TROPONIN I PNL SERPL HS: 129 NG/L (ref 0–20)
CARDIAC TROPONIN I PNL SERPL HS: 136 NG/L (ref 0–20)
CHLORIDE BLDV-SCNC: 105 MMOL/L (ref 98–107)
CHLORIDE BLDV-SCNC: 108 MMOL/L (ref 98–107)
CHLORIDE SERPL-SCNC: 107 MMOL/L (ref 98–107)
CHOLEST SERPL-MCNC: 184 MG/DL (ref 0–199)
CHOLESTEROL/HDL RATIO: 4.3
CO2 SERPL-SCNC: 23 MMOL/L (ref 21–32)
CREAT SERPL-MCNC: 2.16 MG/DL (ref 0.5–1.3)
EGFRCR SERPLBLD CKD-EPI 2021: 36 ML/MIN/1.73M*2
GLUCOSE BLDV-MCNC: 105 MG/DL (ref 74–99)
GLUCOSE BLDV-MCNC: 139 MG/DL (ref 74–99)
GLUCOSE SERPL-MCNC: 128 MG/DL (ref 74–99)
HCO3 BLDV-SCNC: 23.2 MMOL/L (ref 22–26)
HCO3 BLDV-SCNC: 24.2 MMOL/L (ref 22–26)
HCT VFR BLD EST: 51 % (ref 41–52)
HCT VFR BLD EST: 65 % (ref 41–52)
HDLC SERPL-MCNC: 42.9 MG/DL
HGB BLDV-MCNC: 17 G/DL (ref 13.5–17.5)
HGB BLDV-MCNC: 21.7 G/DL (ref 13.5–17.5)
INHALED O2 CONCENTRATION: 28 %
INHALED O2 CONCENTRATION: 28 %
INR PPP: 1.1 (ref 0.9–1.1)
LACTATE BLDV-SCNC: 1 MMOL/L (ref 0.4–2)
LACTATE BLDV-SCNC: 1.2 MMOL/L (ref 0.4–2)
LACTATE SERPL-SCNC: 0.6 MMOL/L (ref 0.4–2)
LDLC SERPL CALC-MCNC: 114 MG/DL
MAGNESIUM SERPL-MCNC: 2.1 MG/DL (ref 1.6–2.4)
NON HDL CHOLESTEROL: 141 MG/DL (ref 0–149)
OXYHGB MFR BLDV: 95.4 % (ref 45–75)
OXYHGB MFR BLDV: 96.6 % (ref 45–75)
PCO2 BLDV: 34 MM HG (ref 41–51)
PCO2 BLDV: 35 MM HG (ref 41–51)
PH BLDV: 7.43 PH (ref 7.33–7.43)
PH BLDV: 7.46 PH (ref 7.33–7.43)
PO2 BLDV: 105 MM HG (ref 35–45)
PO2 BLDV: 86 MM HG (ref 35–45)
POTASSIUM BLDV-SCNC: 3.2 MMOL/L (ref 3.5–5.3)
POTASSIUM BLDV-SCNC: 3.3 MMOL/L (ref 3.5–5.3)
POTASSIUM SERPL-SCNC: 3.3 MMOL/L (ref 3.5–5.3)
PROT SERPL-MCNC: 6.7 G/DL (ref 6.4–8.2)
PROTHROMBIN TIME: 11.9 SECONDS (ref 9.8–12.8)
SAO2 % BLDV: 97 % (ref 45–75)
SAO2 % BLDV: 99 % (ref 45–75)
SODIUM BLDV-SCNC: 133 MMOL/L (ref 136–145)
SODIUM BLDV-SCNC: 134 MMOL/L (ref 136–145)
SODIUM SERPL-SCNC: 139 MMOL/L (ref 136–145)
TRIGL SERPL-MCNC: 134 MG/DL (ref 0–149)
UFH PPP CHRO-ACNC: 0.2 IU/ML
UFH PPP CHRO-ACNC: 0.3 IU/ML
VLDL: 27 MG/DL (ref 0–40)

## 2024-07-20 PROCEDURE — 3430000001 HC RX 343 DIAGNOSTIC RADIOPHARMACEUTICALS

## 2024-07-20 PROCEDURE — 2500000004 HC RX 250 GENERAL PHARMACY W/ HCPCS (ALT 636 FOR OP/ED)

## 2024-07-20 PROCEDURE — 85520 HEPARIN ASSAY: CPT

## 2024-07-20 PROCEDURE — 83735 ASSAY OF MAGNESIUM: CPT

## 2024-07-20 PROCEDURE — 85610 PROTHROMBIN TIME: CPT

## 2024-07-20 PROCEDURE — 84132 ASSAY OF SERUM POTASSIUM: CPT

## 2024-07-20 PROCEDURE — 36415 COLL VENOUS BLD VENIPUNCTURE: CPT

## 2024-07-20 PROCEDURE — 1200000002 HC GENERAL ROOM WITH TELEMETRY DAILY

## 2024-07-20 PROCEDURE — 2500000001 HC RX 250 WO HCPCS SELF ADMINISTERED DRUGS (ALT 637 FOR MEDICARE OP)

## 2024-07-20 PROCEDURE — 94760 N-INVAS EAR/PLS OXIMETRY 1: CPT

## 2024-07-20 PROCEDURE — 83605 ASSAY OF LACTIC ACID: CPT

## 2024-07-20 PROCEDURE — 85520 HEPARIN ASSAY: CPT | Performed by: STUDENT IN AN ORGANIZED HEALTH CARE EDUCATION/TRAINING PROGRAM

## 2024-07-20 PROCEDURE — 93005 ELECTROCARDIOGRAM TRACING: CPT

## 2024-07-20 PROCEDURE — 2500000001 HC RX 250 WO HCPCS SELF ADMINISTERED DRUGS (ALT 637 FOR MEDICARE OP): Performed by: NURSE PRACTITIONER

## 2024-07-20 PROCEDURE — 93971 EXTREMITY STUDY: CPT | Performed by: RADIOLOGY

## 2024-07-20 PROCEDURE — 99222 1ST HOSP IP/OBS MODERATE 55: CPT | Performed by: NURSE PRACTITIONER

## 2024-07-20 PROCEDURE — 78830 RP LOCLZJ TUM SPECT W/CT 1: CPT

## 2024-07-20 PROCEDURE — 2500000002 HC RX 250 W HCPCS SELF ADMINISTERED DRUGS (ALT 637 FOR MEDICARE OP, ALT 636 FOR OP/ED)

## 2024-07-20 PROCEDURE — A9540 TC99M MAA: HCPCS

## 2024-07-20 PROCEDURE — 78803 RP LOCLZJ TUM SPECT 1 AREA: CPT

## 2024-07-20 PROCEDURE — 99222 1ST HOSP IP/OBS MODERATE 55: CPT | Performed by: STUDENT IN AN ORGANIZED HEALTH CARE EDUCATION/TRAINING PROGRAM

## 2024-07-20 PROCEDURE — 84484 ASSAY OF TROPONIN QUANT: CPT

## 2024-07-20 PROCEDURE — 2500000005 HC RX 250 GENERAL PHARMACY W/O HCPCS

## 2024-07-20 PROCEDURE — 80061 LIPID PANEL: CPT

## 2024-07-20 PROCEDURE — 93970 EXTREMITY STUDY: CPT

## 2024-07-20 RX ORDER — NAPROXEN SODIUM 220 MG/1
324 TABLET, FILM COATED ORAL ONCE
Status: CANCELLED | OUTPATIENT
Start: 2024-07-20 | End: 2024-07-20

## 2024-07-20 RX ORDER — ATORVASTATIN CALCIUM 80 MG/1
80 TABLET, FILM COATED ORAL NIGHTLY
Status: CANCELLED | OUTPATIENT
Start: 2024-07-20

## 2024-07-20 RX ORDER — NAPROXEN SODIUM 220 MG/1
81 TABLET, FILM COATED ORAL DAILY
Status: DISCONTINUED | OUTPATIENT
Start: 2024-07-21 | End: 2024-07-21 | Stop reason: HOSPADM

## 2024-07-20 RX ORDER — OXYCODONE HYDROCHLORIDE 10 MG/1
10 TABLET ORAL EVERY 6 HOURS PRN
Status: DISCONTINUED | OUTPATIENT
Start: 2024-07-20 | End: 2024-07-20

## 2024-07-20 RX ORDER — MORPHINE SULFATE 2 MG/ML
2 INJECTION, SOLUTION INTRAMUSCULAR; INTRAVENOUS EVERY 5 MIN PRN
Status: CANCELLED | OUTPATIENT
Start: 2024-07-20

## 2024-07-20 RX ORDER — POLYETHYLENE GLYCOL 3350 17 G/17G
17 POWDER, FOR SOLUTION ORAL NIGHTLY PRN
Status: CANCELLED | OUTPATIENT
Start: 2024-07-20

## 2024-07-20 RX ORDER — AMLODIPINE BESYLATE 10 MG/1
10 TABLET ORAL DAILY
Status: DISCONTINUED | OUTPATIENT
Start: 2024-07-20 | End: 2024-07-21 | Stop reason: HOSPADM

## 2024-07-20 RX ORDER — ACETAMINOPHEN 325 MG/1
975 TABLET ORAL EVERY 8 HOURS
Status: DISCONTINUED | OUTPATIENT
Start: 2024-07-20 | End: 2024-07-21 | Stop reason: HOSPADM

## 2024-07-20 RX ORDER — LORAZEPAM 2 MG/ML
0.5 INJECTION INTRAMUSCULAR ONCE
Status: COMPLETED | OUTPATIENT
Start: 2024-07-20 | End: 2024-07-20

## 2024-07-20 RX ORDER — METOPROLOL TARTRATE 25 MG/1
12.5 TABLET, FILM COATED ORAL 2 TIMES DAILY
Status: DISCONTINUED | OUTPATIENT
Start: 2024-07-20 | End: 2024-07-20

## 2024-07-20 RX ORDER — ACETAMINOPHEN 650 MG/1
650 SUPPOSITORY RECTAL EVERY 6 HOURS
Status: DISCONTINUED | OUTPATIENT
Start: 2024-07-20 | End: 2024-07-20

## 2024-07-20 RX ORDER — METOPROLOL TARTRATE 25 MG/1
25 TABLET, FILM COATED ORAL 2 TIMES DAILY
Status: DISCONTINUED | OUTPATIENT
Start: 2024-07-20 | End: 2024-07-20

## 2024-07-20 RX ORDER — FUROSEMIDE 10 MG/ML
40 INJECTION INTRAMUSCULAR; INTRAVENOUS 2 TIMES DAILY
Status: DISCONTINUED | OUTPATIENT
Start: 2024-07-20 | End: 2024-07-21

## 2024-07-20 RX ORDER — HYDRALAZINE HYDROCHLORIDE 20 MG/ML
10 INJECTION INTRAMUSCULAR; INTRAVENOUS EVERY 6 HOURS PRN
Status: DISCONTINUED | OUTPATIENT
Start: 2024-07-20 | End: 2024-07-20

## 2024-07-20 RX ORDER — POLYETHYLENE GLYCOL 3350 17 G/17G
17 POWDER, FOR SOLUTION ORAL NIGHTLY PRN
Status: DISCONTINUED | OUTPATIENT
Start: 2024-07-20 | End: 2024-07-21 | Stop reason: HOSPADM

## 2024-07-20 RX ORDER — METOPROLOL TARTRATE 25 MG/1
25 TABLET, FILM COATED ORAL EVERY 12 HOURS SCHEDULED
Status: CANCELLED | OUTPATIENT
Start: 2024-07-20

## 2024-07-20 RX ORDER — NITROGLYCERIN 0.4 MG/1
0.4 TABLET SUBLINGUAL EVERY 5 MIN PRN
Status: CANCELLED | OUTPATIENT
Start: 2024-07-20

## 2024-07-20 RX ORDER — POTASSIUM CHLORIDE 14.9 MG/ML
20 INJECTION INTRAVENOUS
Status: COMPLETED | OUTPATIENT
Start: 2024-07-20 | End: 2024-07-20

## 2024-07-20 RX ORDER — FUROSEMIDE 10 MG/ML
20 INJECTION INTRAMUSCULAR; INTRAVENOUS DAILY
Status: DISCONTINUED | OUTPATIENT
Start: 2024-07-20 | End: 2024-07-20

## 2024-07-20 RX ORDER — ACETAMINOPHEN 160 MG/5ML
650 SOLUTION ORAL EVERY 6 HOURS
Status: DISCONTINUED | OUTPATIENT
Start: 2024-07-20 | End: 2024-07-20

## 2024-07-20 RX ORDER — ACETAMINOPHEN 325 MG/1
975 TABLET ORAL EVERY 6 HOURS
Status: DISCONTINUED | OUTPATIENT
Start: 2024-07-20 | End: 2024-07-20

## 2024-07-20 RX ORDER — HEPARIN SODIUM 10000 [USP'U]/100ML
0-4000 INJECTION, SOLUTION INTRAVENOUS CONTINUOUS
Status: DISCONTINUED | OUTPATIENT
Start: 2024-07-20 | End: 2024-07-21

## 2024-07-20 RX ORDER — LABETALOL HYDROCHLORIDE 5 MG/ML
10 INJECTION, SOLUTION INTRAVENOUS EVERY 6 HOURS PRN
Status: DISCONTINUED | OUTPATIENT
Start: 2024-07-20 | End: 2024-07-20

## 2024-07-20 RX ORDER — SENNOSIDES 8.8 MG/5ML
10 LIQUID ORAL 2 TIMES DAILY
Status: CANCELLED | OUTPATIENT
Start: 2024-07-20

## 2024-07-20 RX ORDER — LISINOPRIL 10 MG/1
10 TABLET ORAL DAILY
Status: DISCONTINUED | OUTPATIENT
Start: 2024-07-20 | End: 2024-07-21 | Stop reason: HOSPADM

## 2024-07-20 RX ORDER — MORPHINE SULFATE 2 MG/ML
0.5 INJECTION, SOLUTION INTRAMUSCULAR; INTRAVENOUS EVERY 4 HOURS PRN
Status: DISCONTINUED | OUTPATIENT
Start: 2024-07-20 | End: 2024-07-20

## 2024-07-20 RX ORDER — LOSARTAN POTASSIUM 50 MG/1
25 TABLET ORAL DAILY
Status: DISCONTINUED | OUTPATIENT
Start: 2024-07-20 | End: 2024-07-20

## 2024-07-20 RX ORDER — METOPROLOL SUCCINATE 25 MG/1
25 TABLET, EXTENDED RELEASE ORAL DAILY
Status: DISCONTINUED | OUTPATIENT
Start: 2024-07-20 | End: 2024-07-21 | Stop reason: HOSPADM

## 2024-07-20 RX ORDER — ACETAMINOPHEN 325 MG/1
650 TABLET ORAL EVERY 6 HOURS PRN
Status: DISCONTINUED | OUTPATIENT
Start: 2024-07-20 | End: 2024-07-20

## 2024-07-20 RX ORDER — ACETAMINOPHEN 650 MG/1
650 SUPPOSITORY RECTAL EVERY 6 HOURS PRN
Status: DISCONTINUED | OUTPATIENT
Start: 2024-07-20 | End: 2024-07-20

## 2024-07-20 RX ORDER — FUROSEMIDE 10 MG/ML
40 INJECTION INTRAMUSCULAR; INTRAVENOUS DAILY
Status: DISCONTINUED | OUTPATIENT
Start: 2024-07-21 | End: 2024-07-20

## 2024-07-20 RX ORDER — NAPROXEN SODIUM 220 MG/1
324 TABLET, FILM COATED ORAL ONCE
Status: COMPLETED | OUTPATIENT
Start: 2024-07-20 | End: 2024-07-20

## 2024-07-20 RX ORDER — FUROSEMIDE 10 MG/ML
20 INJECTION INTRAMUSCULAR; INTRAVENOUS ONCE
Status: DISCONTINUED | OUTPATIENT
Start: 2024-07-20 | End: 2024-07-20

## 2024-07-20 RX ORDER — SENNOSIDES 8.8 MG/5ML
10 LIQUID ORAL 2 TIMES DAILY
Status: DISCONTINUED | OUTPATIENT
Start: 2024-07-20 | End: 2024-07-21 | Stop reason: HOSPADM

## 2024-07-20 RX ORDER — OXYCODONE HYDROCHLORIDE 5 MG/1
5 TABLET ORAL EVERY 6 HOURS PRN
Status: DISCONTINUED | OUTPATIENT
Start: 2024-07-20 | End: 2024-07-21 | Stop reason: HOSPADM

## 2024-07-20 RX ORDER — ACETAMINOPHEN 160 MG/5ML
650 SOLUTION ORAL EVERY 6 HOURS PRN
Status: DISCONTINUED | OUTPATIENT
Start: 2024-07-20 | End: 2024-07-20

## 2024-07-20 RX ADMIN — FUROSEMIDE 20 MG: 10 INJECTION, SOLUTION INTRAMUSCULAR; INTRAVENOUS at 03:04

## 2024-07-20 RX ADMIN — FUROSEMIDE 40 MG: 10 INJECTION, SOLUTION INTRAMUSCULAR; INTRAVENOUS at 20:45

## 2024-07-20 RX ADMIN — OXYCODONE HYDROCHLORIDE 5 MG: 5 TABLET ORAL at 22:33

## 2024-07-20 RX ADMIN — HEPARIN SODIUM 2000 UNITS/HR: 10000 INJECTION, SOLUTION INTRAVENOUS at 22:33

## 2024-07-20 RX ADMIN — ASPIRIN 81 MG 324 MG: 81 TABLET ORAL at 03:06

## 2024-07-20 RX ADMIN — TICAGRELOR 180 MG: 90 TABLET ORAL at 03:05

## 2024-07-20 RX ADMIN — HEPARIN SODIUM 1400 UNITS/HR: 10000 INJECTION, SOLUTION INTRAVENOUS at 05:46

## 2024-07-20 RX ADMIN — LORAZEPAM 0.5 MG: 2 INJECTION, SOLUTION INTRAMUSCULAR; INTRAVENOUS at 05:47

## 2024-07-20 RX ADMIN — OXYCODONE HYDROCHLORIDE 5 MG: 5 TABLET ORAL at 16:24

## 2024-07-20 RX ADMIN — Medication 3 L/MIN: at 15:35

## 2024-07-20 RX ADMIN — POTASSIUM CHLORIDE 20 MEQ: 14.9 INJECTION, SOLUTION INTRAVENOUS at 05:47

## 2024-07-20 RX ADMIN — FUROSEMIDE 20 MG: 10 INJECTION, SOLUTION INTRAMUSCULAR; INTRAVENOUS at 06:57

## 2024-07-20 RX ADMIN — OXYCODONE HYDROCHLORIDE 10 MG: 10 TABLET ORAL at 03:05

## 2024-07-20 RX ADMIN — POTASSIUM CHLORIDE 20 MEQ: 14.9 INJECTION, SOLUTION INTRAVENOUS at 08:16

## 2024-07-20 RX ADMIN — KIT FOR THE PREPARATION OF TECHNETIUM TC 99M ALBUMIN AGGREGATED 4.1 MILLICURIE: 2.5 INJECTION, POWDER, FOR SOLUTION INTRAVENOUS at 09:00

## 2024-07-20 RX ADMIN — TICAGRELOR 90 MG: 90 TABLET ORAL at 20:45

## 2024-07-20 RX ADMIN — AMLODIPINE BESYLATE 10 MG: 10 TABLET ORAL at 10:37

## 2024-07-20 RX ADMIN — METOPROLOL SUCCINATE 25 MG: 25 TABLET, EXTENDED RELEASE ORAL at 11:06

## 2024-07-20 RX ADMIN — LISINOPRIL 10 MG: 10 TABLET ORAL at 11:06

## 2024-07-20 ASSESSMENT — COGNITIVE AND FUNCTIONAL STATUS - GENERAL
CLIMB 3 TO 5 STEPS WITH RAILING: A LOT
WALKING IN HOSPITAL ROOM: A LITTLE
MOBILITY SCORE: 19
WALKING IN HOSPITAL ROOM: A LITTLE
CLIMB 3 TO 5 STEPS WITH RAILING: A LOT
MOBILITY SCORE: 19
DAILY ACTIVITIY SCORE: 20
HELP NEEDED FOR BATHING: A LITTLE
DRESSING REGULAR LOWER BODY CLOTHING: A LITTLE
STANDING UP FROM CHAIR USING ARMS: A LITTLE
HELP NEEDED FOR BATHING: A LITTLE
STANDING UP FROM CHAIR USING ARMS: A LITTLE
DAILY ACTIVITIY SCORE: 20
TOILETING: A LITTLE
DRESSING REGULAR LOWER BODY CLOTHING: A LITTLE
DRESSING REGULAR UPPER BODY CLOTHING: A LITTLE
DRESSING REGULAR UPPER BODY CLOTHING: A LITTLE
MOVING TO AND FROM BED TO CHAIR: A LITTLE
MOVING TO AND FROM BED TO CHAIR: A LITTLE
TOILETING: A LITTLE

## 2024-07-20 ASSESSMENT — PAIN DESCRIPTION - ORIENTATION: ORIENTATION: RIGHT;LEFT

## 2024-07-20 ASSESSMENT — ENCOUNTER SYMPTOMS
LIGHT-HEADEDNESS: 1
RESPIRATORY NEGATIVE: 1
FATIGUE: 1
ENDOCRINE NEGATIVE: 1
SHORTNESS OF BREATH: 1
UNEXPECTED WEIGHT CHANGE: 1
MYALGIAS: 1
DIZZINESS: 1
NEUROLOGICAL NEGATIVE: 1
PSYCHIATRIC NEGATIVE: 1
HEMATOLOGIC/LYMPHATIC NEGATIVE: 1
GASTROINTESTINAL NEGATIVE: 1
EYES NEGATIVE: 1
DYSPNEA ON EXERTION: 1

## 2024-07-20 ASSESSMENT — ACTIVITIES OF DAILY LIVING (ADL): LACK_OF_TRANSPORTATION: NO

## 2024-07-20 ASSESSMENT — PAIN - FUNCTIONAL ASSESSMENT
PAIN_FUNCTIONAL_ASSESSMENT: 0-10

## 2024-07-20 ASSESSMENT — PAIN SCALES - GENERAL
PAINLEVEL_OUTOF10: 3
PAINLEVEL_OUTOF10: 3
PAINLEVEL_OUTOF10: 8
PAINLEVEL_OUTOF10: 6
PAINLEVEL_OUTOF10: 0 - NO PAIN
PAINLEVEL_OUTOF10: 0 - NO PAIN
PAINLEVEL_OUTOF10: 3
PAINLEVEL_OUTOF10: 4

## 2024-07-20 ASSESSMENT — PAIN DESCRIPTION - DESCRIPTORS: DESCRIPTORS: ACHING

## 2024-07-20 ASSESSMENT — PAIN DESCRIPTION - LOCATION: LOCATION: LEG

## 2024-07-20 NOTE — PROGRESS NOTES
Iglesia Clarke is a 53 y.o. male on day 1 of admission presenting with NSTEMI (non-ST elevated myocardial infarction) (Multi).      Subjective   No acute events since admission. Patient reports difficulties with breathing when he's in certain positions, significant for orthopnea. Patient expressing desire to leave the hospital ASAP, stating that he was able to take 40-50 pounds of water weight off himself at home.     Objective     Last Recorded Vitals  BP (!) 173/111   Pulse 78   Temp 36.8 °C (98.2 °F) (Temporal)   Resp 22   Wt 115 kg (253 lb 8.5 oz)   SpO2 98%   Intake/Output last 3 Shifts:    Intake/Output Summary (Last 24 hours) at 7/20/2024 1304  Last data filed at 7/20/2024 1226  Gross per 24 hour   Intake 288.67 ml   Output 1850 ml   Net -1561.33 ml       Admission Weight  Weight: 116 kg (255 lb 4.7 oz) (07/19/24 2312)    Daily Weight  07/20/24 : 115 kg (253 lb 8.5 oz)    Image Results  Lower extremity venous duplex bilateral  Narrative: Interpreted By:  Norma Webb,   STUDY:  [facility] LOWER EXTREMITY VENOUS DUPLEX BILATERAL;  7/20/2024 10:15 am      INDICATION:  Signs/Symptoms:to excude DVT.      COMPARISON:  None.      ACCESSION NUMBER(S):  [id number]      ORDERING CLINICIAN:  DAMIÁN GONZALEZ      TECHNIQUE:  Vascular ultrasound of both lower extremities was performed. Real  time compression views as well as Gray scale, color Doppler and  spectral Doppler waveform analysis was performed.  Augmentation was  performed.      FINDINGS:  Evaluation of the visualized portions of the  right and left common  femoral vein, proximal, mid, and distal femoral vein, and popliteal  vein was performed. There is no deep venous thrombosis.      Evaluation of the calf veins was performed. There is no evidence for  deep venous thrombosis of the calf veins. There is edema in the  subcutaneous fat of the left calf.      Impression: No deep venous thrombosis bilateral lower extremities.  Edema in the subcutaneous fat of  the left calf.      MACRO:  None      Signed by: Norma Webb 7/20/2024 10:27 AM  Dictation workstation:   [id number]  NM Lung perfusion with spect  Narrative: Interpreted By:  Jerman Wong,   STUDY:  NM LUNG PERFUSION WITH SPECT;  7/20/2024 9:43 am      INDICATION:  Signs/Symptoms:To exclude PE.      COMPARISON:  Chest x-ray dated 07/19/2024      ACCESSION NUMBER(S):  [id number]      ORDERING CLINICIAN:  DAMIÁN GONZALEZ      TECHNIQUE:  DIVISION OF NUCLEAR MEDICINE  PERFUSION LUNG SCAN      Multiple perfusion images of the lungs were obtained after the  intravenous administration of  4.1 mCi of Tc-99m MAA. SPECT CT images  of the lungs were also obtained.          FINDINGS:  Perfusion images demonstrate mild heterogeneity without evidence of  wedge-shaped abnormalities to suggest acute pulmonary embolism.      Impression: Low probability of acute pulmonary embolism.      Images were interpreted at [facility].      Signed by: Jerman Wong 7/20/2024 9:51 AM  Dictation workstation:   [id number]      Physical Exam  Vitals reviewed.   Constitutional:       General: He is not in acute distress.  Cardiovascular:      Rate and Rhythm: Normal rate and regular rhythm.      Heart sounds: Normal heart sounds. No murmur heard.  Pulmonary:      Breath sounds: Normal breath sounds. No wheezing or rhonchi.   Abdominal:      General: There is no distension.      Palpations: Abdomen is soft.      Tenderness: There is no abdominal tenderness.   Musculoskeletal:      Right lower leg: Edema present.      Left lower leg: Edema present.      Comments: Bilateral LE pitting edema, +1-2   Skin:     Comments: Bilateral LE scabs   Neurological:      General: No focal deficit present.      Mental Status: He is alert and oriented to person, place, and time. Mental status is at baseline.       Relevant Results  Scheduled medications  acetaminophen, 975 mg, oral, q8h  amLODIPine, 10 mg, oral,  Daily  furosemide, 40 mg, intravenous, BID  lisinopril, 10 mg, oral, Daily  metoprolol succinate XL, 25 mg, oral, Daily  oxygen, , inhalation, Continuous - Inhalation  senna, 10 mL, oral, BID  ticagrelor, 90 mg, oral, BID      Continuous medications  heparin, 0-4,000 Units/hr, Last Rate: 1,600 Units/hr (07/20/24 1029)      PRN medications  PRN medications: morphine, oxyCODONE, polyethylene glycol    Results for orders placed or performed during the hospital encounter of 07/19/24 (from the past 24 hour(s))   Heparin Assay   Result Value Ref Range    Heparin Unfractionated 0.2 See Comment Below for Therapeutic Ranges IU/mL   Troponin I, High Sensitivity   Result Value Ref Range    Troponin I, High Sensitivity 136 (HH) 0 - 20 ng/L   Lactate   Result Value Ref Range    Lactate 0.6 0.4 - 2.0 mmol/L   Troponin I, High Sensitivity   Result Value Ref Range    Troponin I, High Sensitivity 123 (HH) 0 - 20 ng/L   Magnesium   Result Value Ref Range    Magnesium 2.10 1.60 - 2.40 mg/dL   Coagulation Screen   Result Value Ref Range    Protime 11.9 9.8 - 12.8 seconds    INR 1.1 0.9 - 1.1    aPTT 47 (H) 27 - 38 seconds   Comprehensive Metabolic Panel   Result Value Ref Range    Glucose 128 (H) 74 - 99 mg/dL    Sodium 139 136 - 145 mmol/L    Potassium 3.3 (L) 3.5 - 5.3 mmol/L    Chloride 107 98 - 107 mmol/L    Bicarbonate 23 21 - 32 mmol/L    Anion Gap 12 10 - 20 mmol/L    Urea Nitrogen 33 (H) 6 - 23 mg/dL    Creatinine 2.16 (H) 0.50 - 1.30 mg/dL    eGFR 36 (L) >60 mL/min/1.73m*2    Calcium 8.6 8.6 - 10.3 mg/dL    Albumin 3.3 (L) 3.4 - 5.0 g/dL    Alkaline Phosphatase 60 33 - 120 U/L    Total Protein 6.7 6.4 - 8.2 g/dL    AST 14 9 - 39 U/L    Bilirubin, Total 0.3 0.0 - 1.2 mg/dL    ALT 15 10 - 52 U/L   Blood Gas Venous Full Panel   Result Value Ref Range    POCT pH, Venous 7.46 (H) 7.33 - 7.43 pH    POCT pCO2, Venous 34 (L) 41 - 51 mm Hg    POCT pO2, Venous 86 (H) 35 - 45 mm Hg    POCT SO2, Venous 97 (H) 45 - 75 %    POCT Oxy  Hemoglobin, Venous 95.4 (H) 45.0 - 75.0 %    POCT Hematocrit Calculated, Venous 51.0 41.0 - 52.0 %    POCT Sodium, Venous 134 (L) 136 - 145 mmol/L    POCT Potassium, Venous 3.2 (L) 3.5 - 5.3 mmol/L    POCT Chloride, Venous 108 (H) 98 - 107 mmol/L    POCT Ionized Calicum, Venous 1.18 1.10 - 1.33 mmol/L    POCT Glucose, Venous 139 (H) 74 - 99 mg/dL    POCT Lactate, Venous 1.0 0.4 - 2.0 mmol/L    POCT Base Excess, Venous 1.0 -2.0 - 3.0 mmol/L    POCT HCO3 Calculated, Venous 24.2 22.0 - 26.0 mmol/L    POCT Hemoglobin, Venous 17.0 13.5 - 17.5 g/dL    POCT Anion Gap, Venous 5.0 (L) 10.0 - 25.0 mmol/L    Patient Temperature      FiO2 28 %   Lipid Panel   Result Value Ref Range    Cholesterol 184 0 - 199 mg/dL    HDL-Cholesterol 42.9 mg/dL    Cholesterol/HDL Ratio 4.3     LDL Calculated 114 (H) <=99 mg/dL    VLDL 27 0 - 40 mg/dL    Triglycerides 134 0 - 149 mg/dL    Non HDL Cholesterol 141 0 - 149 mg/dL   Heparin Assay   Result Value Ref Range    Heparin Unfractionated 0.3 See Comment Below for Therapeutic Ranges IU/mL   BLOOD GAS VENOUS FULL PANEL   Result Value Ref Range    POCT pH, Venous 7.43 7.33 - 7.43 pH    POCT pCO2, Venous 35 (L) 41 - 51 mm Hg    POCT pO2, Venous 105 (H) 35 - 45 mm Hg    POCT SO2, Venous 99 (H) 45 - 75 %    POCT Oxy Hemoglobin, Venous 96.6 (H) 45.0 - 75.0 %    POCT Hematocrit Calculated, Venous 65.0 (H) 41.0 - 52.0 %    POCT Sodium, Venous 133 (L) 136 - 145 mmol/L    POCT Potassium, Venous 3.3 (L) 3.5 - 5.3 mmol/L    POCT Chloride, Venous 105 98 - 107 mmol/L    POCT Ionized Calicum, Venous 1.12 1.10 - 1.33 mmol/L    POCT Glucose, Venous 105 (H) 74 - 99 mg/dL    POCT Lactate, Venous 1.2 0.4 - 2.0 mmol/L    POCT Base Excess, Venous -0.3 -2.0 - 3.0 mmol/L    POCT HCO3 Calculated, Venous 23.2 22.0 - 26.0 mmol/L    POCT Hemoglobin, Venous 21.7 (H) 13.5 - 17.5 g/dL    POCT Anion Gap, Venous 8.0 (L) 10.0 - 25.0 mmol/L    Patient Temperature      FiO2 28 %   Troponin I, High Sensitivity   Result Value Ref  Range    Troponin I, High Sensitivity 129 (HH) 0 - 20 ng/L   Heparin Assay   Result Value Ref Range    Heparin Unfractionated 0.2 See Comment Below for Therapeutic Ranges IU/mL     NM Lung perfusion with spect 07/20/2024    Narrative  Interpreted By:  Jerman Wong,  STUDY:  NM LUNG PERFUSION WITH SPECT;  7/20/2024 9:43 am    INDICATION:  Signs/Symptoms:To exclude PE.    COMPARISON:  Chest x-ray dated 07/19/2024    ACCESSION NUMBER(S):  [id number]    ORDERING CLINICIAN:  DAMIÁN GONZALEZ    TECHNIQUE:  DIVISION OF NUCLEAR MEDICINE  PERFUSION LUNG SCAN    Multiple perfusion images of the lungs were obtained after the  intravenous administration of  4.1 mCi of Tc-99m MAA. SPECT CT images  of the lungs were also obtained.      FINDINGS:  Perfusion images demonstrate mild heterogeneity without evidence of  wedge-shaped abnormalities to suggest acute pulmonary embolism.    Impression  Low probability of acute pulmonary embolism.    Images were interpreted at [facility].    Signed by: Jerman Wong 7/20/2024 9:51 AM  Dictation workstation:   [id number]    Lower extremity venous duplex bilateral 07/20/2024    Narrative  Interpreted By:  Norma Webb,  STUDY:  [facility] LOWER EXTREMITY VENOUS DUPLEX BILATERAL;  7/20/2024 10:15 am    INDICATION:  Signs/Symptoms:to excude DVT.    COMPARISON:  None.    ACCESSION NUMBER(S):  [id number]    ORDERING CLINICIAN:  DAMIÁN GONZALEZ    TECHNIQUE:  Vascular ultrasound of both lower extremities was performed. Real  time compression views as well as Gray scale, color Doppler and  spectral Doppler waveform analysis was performed.  Augmentation was  performed.    FINDINGS:  Evaluation of the visualized portions of the  right and left common  femoral vein, proximal, mid, and distal femoral vein, and popliteal  vein was performed. There is no deep venous thrombosis.    Evaluation of the calf veins was performed. There is no evidence for  deep venous  thrombosis of the calf veins. There is edema in the  subcutaneous fat of the left calf.    Impression  No deep venous thrombosis bilateral lower extremities.  Edema in the subcutaneous fat of the left calf.    MACRO:  None    Signed by: Norma Webb 7/20/2024 10:27 AM  Dictation workstation:   [id number]      Assessment/Plan   Iglesia Clarke is a 53 y.o. male with past medical history of HFrEF (EF 25-30% ), CKD ,  lung nodule, chronic anemia , left-sided renal stone , inguinal hernia, hypertension who was a transfer from [place] for the evaluation and management of acute exacerbation of heart failure and concern for NSTEMI.         Acute Medical Issues      # Acute hypoxic respiratory failure 2/2 HFrEF exacerbation  # Concern for NSTEMI  - Cardiology consulted, plan for IV Lasix 40 mg BID, will also start lisinopril 10 mg, metoprolol succinate 25 mg daily, and to continue amlodipine  - Will continue heparin drip in the meantime, potential cath on Monday  - Discontinuing troponin trend, will reorder if chest pain present  - S/p aspirin and Brilinta load  - Continue aspirin 81 mg and Brilinta 90 mg BID  - Currently on 3L O2, wean as tolerated  - Strict Is/Os, daily weights, 1200 mL fluid restriction  - Pending repeat echo     #ALESSANDRA on CKD 2/2 cardiorenal syndrome  # Hypokalemia  - baseline 2.03, current Cr 2.32  - trending down, monitor. Appears to have responded somewhat with diuresis  - K+ 3.4 and repleted, continue to monitor and replete as needed    #Intermittent claudication  -Vascular can be consulted for further evaluation and management.  -Pain regimen Tylenol 975 twice daily, oxycodone 5 mg every 6 as needed for moderate pain, morphine 0.5 mg q6prn for breakthrough pain.     Resolved medical Issues  #PE rule out  - V/Q scan showing low probability for acute PE  - DVT of bilateral LE negative for DVT             Chronic Medical Issues      # Left sided kidney stone: outpatient follow up.  # Left sided  inguinal hernia: needs outpatient follow up     IVF: PRN   Electrolytes: Replete as needed   Diet: Cardiac diet with 1200 mL fluid restriction  GI ppx: none  DVT ppx: heparin gtt  Antibiotics: none  Lines: PIV  Code: DNR and No Intubation and No ICU         Disposition: 53 y.o.male admitted for acute hypoxic respiratory failure 2/2 CHF exacerbation and NSTEMI concern. Will remain in the hospital pending further cardiology recommendations and management.    Johnny Phipps MD

## 2024-07-20 NOTE — CONSULTS
Consults  History Of Present Illness:    Iglesia Clarke is a very pleasant 53 year old gentleman with a history of HFrEF (LVEF 25-30%), CKD, angelo nodule, HTN and medication non compliance, presented to the ER with increased shortness of breath and increased peripheral edema. He states he has been shortness of breath since March. Denies chest pain or heart palpitations. He complains of bilateral leg pain that occurs more at rest.     Review of Systems   Constitutional: Positive for malaise/fatigue.   HENT: Negative.     Eyes: Negative.    Cardiovascular:  Positive for dyspnea on exertion and leg swelling.   Respiratory: Negative.     Endocrine: Negative.    Hematologic/Lymphatic: Negative.    Skin: Negative.    Musculoskeletal:  Positive for myalgias.   Gastrointestinal: Negative.    Neurological: Negative.    Psychiatric/Behavioral: Negative.           Last Recorded Vitals:  Vitals:    07/20/24 0305 07/20/24 0444 07/20/24 0605 07/20/24 0650   BP: (!) 169/98 (!) 147/95  (!) 168/105   BP Location:  Right arm     Patient Position:  Lying     Pulse: 84 78  78   Resp:  18     Temp:  36.8 °C (98.2 °F)     TempSrc:  Temporal     SpO2:  95%     Weight:   115 kg (253 lb 8.5 oz)    Height:           Last Labs:  CBC - 7/19/2024: 11:35 AM  8.7 13.0 328    39.8      CMP - 7/20/2024:  4:25 AM  8.6 6.7 14 --- 0.3   _ 3.3 15 60      PTT - 7/20/2024:  4:25 AM  1.1   11.9 47     Troponin I, High Sensitivity   Date/Time Value Ref Range Status   07/20/2024 07:02  (HH) 0 - 20 ng/L Final     Comment:     Previous result verified on 7/19/2024 1516 on specimen/case [id number] called with component TRPHS for procedure Troponin I, High Sensitivity, Initial with value 115 ng/L.   07/20/2024 04:25  (HH) 0 - 20 ng/L Final     Comment:     Previous result verified on 7/19/2024 1516 on specimen/case [id number] called with component TRPHS for procedure Troponin I, High Sensitivity, Initial with value 115 ng/L.   07/20/2024  12:44  (HH) 0 - 20 ng/L Final     Comment:     Previous result verified on 7/19/2024 1516 on specimen/case [id number] called with component [facility] for procedure Troponin I, High Sensitivity, Initial with value 115 ng/L.     BNP   Date/Time Value Ref Range Status   07/19/2024 11:35 AM 2,172 (H) 0 - 99 pg/mL Final   02/10/2023 06:45 AM 2,060 (H) 0 - 99 pg/mL Final     Comment:     .  <100 pg/mL - Heart failure unlikely  100-299 pg/mL - Intermediate probability of acute heart  .               failure exacerbation. Correlate with clinical  .               context and patient history.    >=300 pg/mL - Heart Failure likely. Correlate with clinical  .               context and patient history.  BNP testing is performed using different testing   methodology at [facility] than at other   system hospitals. Direct result comparisons should   only be made within the same method.     02/09/2023 08:52 AM 4,102 (H) 0 - 99 pg/mL Final     Comment:     .  <100 pg/mL - Heart failure unlikely  100-299 pg/mL - Intermediate probability of acute heart  .               failure exacerbation. Correlate with clinical  .               context and patient history.    >=300 pg/mL - Heart Failure likely. Correlate with clinical  .               context and patient history.  BNP testing is performed using different testing   methodology at [facility] than at other   system hospitals. Direct result comparisons should   only be made within the same method.       Hemoglobin A1C   Date/Time Value Ref Range Status   02/10/2023 06:45 AM 6.4 (A) % Final     Comment:          Diagnosis of Diabetes-Adults   Non-Diabetic: < or = 5.6%   Increased risk for developing diabetes: 5.7-6.4%   Diagnostic of diabetes: > or = 6.5%  .       Monitoring of Diabetes                Age (y)     Therapeutic Goal (%)   Adults:          >18           <7.0   Pediatrics:    13-18           <7.5                   7-12           <8.0                    0- 6            7.5-8.5   American Diabetes Association. Diabetes Care 33(S1), Jan 2010.       LDL Calculated   Date/Time Value Ref Range Status   07/20/2024 04:25  (H) <=99 mg/dL Final     Comment:                                 Near   Borderline      AGE      Desirable  Optimal    High     High     Very High     0-19 Y     0 - 109     ---    110-129   >/= 130     ----    20-24 Y     0 - 119     ---    120-159   >/= 160     ----      >24 Y     0 -  99   100-129  130-159   160-189     >/=190       VLDL   Date/Time Value Ref Range Status   07/20/2024 04:25 AM 27 0 - 40 mg/dL Final   02/10/2023 06:45 AM 14 0 - 40 mg/dL Final   08/20/2021 09:30 AM 41 (H) 0 - 40 mg/dL Final   08/17/2020 05:03 PM 24 0 - 40 mg/dL Final      Last I/O:  I/O last 3 completed shifts:  In: 88.7 (0.8 mL/kg) [I.V.:88.7 (0.8 mL/kg)]  Out: 900 (7.8 mL/kg) [Urine:900 (0.2 mL/kg/hr)]  Weight: 115 kg     Past Cardiology Tests (Last 3 Years):  EKG:  Showed sinus rhythm with PVCs heart rate 94 bpm     Echo:  Echo 2/10/2023  1. Left ventricular systolic function is severely decreased with a 25-30% estimated ejection fraction.   2. Spectral Doppler shows an abnormal pattern of left ventricular diastolic filling.   3. There is moderate left ventricular hypertrophy.   4. There is mildly reduced right ventricular systolic function.   5. The left atrium is moderately dilated.    Ejection Fractions:  LVEF 25-30%  Cath:  No results found for this or any previous visit from the past 1095 days.    Stress Test:  No results found for this or any previous visit from the past 1095 days.    Cardiac Imaging:  No results found for this or any previous visit from the past 1095 days.      Past Medical History:  He has a past medical history of Kidney stones.    Past Surgical History:  He has no past surgical history on file.      Social History:  He reports that he has never smoked. He has never used smokeless tobacco. He reports that he does not  currently use alcohol. He reports that he does not use drugs.    Family History:  No family history on file.     Allergies:  Patient has no known allergies.    Inpatient Medications:  Scheduled medications   Medication Dose Route Frequency    acetaminophen  975 mg oral q8h    amLODIPine  10 mg oral Daily    furosemide  20 mg intravenous Daily    [Held by provider] metoprolol tartrate  12.5 mg oral BID    oxygen   inhalation Continuous - Inhalation    senna  10 mL oral BID    ticagrelor  90 mg oral BID     PRN medications   Medication    [Held by provider] hydrALAZINE    morphine    oxyCODONE    polyethylene glycol     Continuous Medications   Medication Dose Last Rate    heparin  0-4,000 Units/hr 1,600 Units/hr (07/20/24 1029)     Outpatient Medications:  Current Outpatient Medications   Medication Instructions    acetaminophen (TYLENOL) 500 mg, oral, Every 6 hours PRN    acetaminophen-codeine (Tylenol w/ Codeine #3) 300-30 mg tablet 1 tablet, oral, Every 8 hours PRN       Physical Exam:  Physical Exam  Vitals reviewed.   HENT:      Head: Normocephalic.      Nose: Nose normal.   Eyes:      Pupils: Pupils are equal, round, and reactive to light.   Cardiovascular:      Rate and Rhythm: Normal rate and regular rhythm.   Pulmonary:      Effort: Pulmonary effort is normal.      Breath sounds: rhonchi heart bilaterally   Abdominal:      General: Abdomen is flat.      Palpations: Abdomen is soft.   Musculoskeletal:         General: Normal range of motion.      Cervical back: Normal range of motion.   Swelling bilaterally   Skin:     General: Skin is warm and dry.   Neurological:      General: No focal deficit present.      Mental Status: He is alert and oriented to person, place, and time.   Psychiatric:         Mood and Affect: Mood normal.          Assessment/Plan   Mr. Clarke is a very pleasant 53 year old gentleman with a history of HFrEF (LVEF 25-30%), HTN, former history of tobacco use,  medication non compliance and  CKD, presented to the ER with increased shortness of breath and peripheral edema. Troponin elevated in setting of CKD and elevated BNP      Plan     -start Metoprolol ER 25 mg daily   -start Lisinopril 10 mg daily   -continue Amlodipine   -echocardiogram   -continue Lasix 40 mg IV twice a day   Code Status:  DNR and No Intubation and No ICU    I spent minutes in the professional and overall care of this patient.        Nathaly Edwards, APRN-CNP

## 2024-07-20 NOTE — H&P
Internal Medicine-History and Physical Note   Patient: Iglesia Clarke  Room/bed:  116/116-A  Admitted on: 7/19/2024    Age: 53 y.o.   Gender:male   Code Status:  DNR and No Intubation and No ICU   Admitting Dx: NSTEMI (non-ST elevated myocardial infarction) (Multi) [I21.4]    MRN: [id number]  PCP: Nick Petty MD     Attending: [unfilled] Nurse: No care team member to display  TCC: No care team member to display        HPI    HPI: Iglesia Clarke is a 53 y.o. with past medical history of HFrEF (EF 20-30% ), CKD ,  lung nodule, chronic anemia , left-sided renal stone , inguinal hernia, hypertension who was a transfer from [facility]  for the evaluation and management of acute exacerbation of heart failure and concern for NSTEMI.  According to the patient he went to his PCP office on 7/19 for the concern of bilateral lower leg severe cramp. His  BP was found to be very high, he was asked to come to the ER.  In the ER initial labs showed he has high D-dimer, high troponin and EKG changes with concern of NSTEMI, heparin drip was started and he was transferred to [facility] for further management.  Patient admitted,  he has bilateral lower leg muscle Cramps which usually starts 1 AM in the morning, gradually increasing the intensity with the highest of 9/10 in the morning.  Was also complaining shortness of breath and increased abdominal girth which started in March of this year after recent hospital admission(admitted in [facility] for left-sided renal stone and hydronephrosis) which recently got worse for 1 week,.  He takes Tylenol/codeine for his pain.  He had an hospital admission due to extensive bilateral lower extremity swelling in April  ( 2023), echo done at that time showed (EF 20 to 30%).  He never followed up with a cardiologist but was following with his PCP regarding that.  Patient is a former smoker, used to smoke 1-3 PPD x 30 years, quit 7 years ago, occasional drinker, no illegal drug history.  Family history not  significant for any chronic disease.       ED course:     Vitals:  No ED vitals available     Labs:   - CBC: Unremarkable except hemoglobin 13  - CMP: Glucose 122, BUN/creatinine 31/2.32  - BNP  2,172, troponin 115>>129>>136,, D-dimer 521, lactate normal, TSH normal      Imaging:   - CXR: Pulmonary edema  - EKG: NSTEMI  -US scrotum with Doppler: Large left inguinal hernia    Micro:   - Blood culture pending  - UA unremarkable      Interventions: Heparin drip, Lasix 20 mg, hydralazine 20 mg , labetalol 20 mg, Ativan 1 mg    ROS: 12 points review of system is negative except as stated in the HPI above.   ROS  Review of Systems   Constitutional:  Positive for fatigue and unexpected weight change.   Respiratory:  Positive for shortness of breath.    Cardiovascular:  Positive for leg swelling.        Orthopnea, PND   Gastrointestinal:         Increase abdominal girth.   Musculoskeletal:  Positive for myalgias (Severe cramping in the back of the leg muscles bl).   Skin:         Scattered reddish pustular itchy lesions bilaterally on lower legs   Neurological:  Positive for dizziness and light-headedness.        Past Medical History     Past Medical History:   Diagnosis Date    Kidney stones         Surgical History   No past surgical history on file.    Family History   No family history on file.    Social History     Social History     Socioeconomic History    Marital status: Single     Spouse name: Not on file    Number of children: Not on file    Years of education: Not on file    Highest education level: Not on file   Occupational History    Not on file   Tobacco Use    Smoking status: Never    Smokeless tobacco: Never   Substance and Sexual Activity    Alcohol use: Not Currently    Drug use: Never    Sexual activity: Not on file   Other Topics Concern    Not on file   Social History Narrative    Not on file     Social Determinants of Health     Financial Resource Strain: Low Risk  (7/19/2024)    Overall Financial  Resource Strain (CARDIA)     Difficulty of Paying Living Expenses: Not hard at all   Food Insecurity: No Food Insecurity (4/1/2024)    Received from [facility]    Hunger Vital Sign     Worried About Running Out of Food in the Last Year: Never true     Ran Out of Food in the Last Year: Never true   Transportation Needs: Unmet Transportation Needs (7/19/2024)    PRAPARE - Transportation     Lack of Transportation (Medical): Yes     Lack of Transportation (Non-Medical): No   Physical Activity: Not on file   Stress: Not on file   Social Connections: Not on file   Intimate Partner Violence: Not on file   Housing Stability: Low Risk  (7/19/2024)    Housing Stability Vital Sign     Unable to Pay for Housing in the Last Year: No     Number of Times Moved in the Last Year: 1     Homeless in the Last Year: No       Tobacco Use: Low Risk  (7/19/2024)    Patient History     Smoking Tobacco Use: Never     Smokeless Tobacco Use: Never     Passive Exposure: Not on file        Social History     Substance and Sexual Activity   Alcohol Use Not Currently        Allergies   No Known Allergies       Meds    Scheduled medications  acetaminophen, 975 mg, oral, q8h  furosemide, 20 mg, intravenous, Daily  losartan, 25 mg, oral, Daily  metoprolol tartrate, 12.5 mg, oral, BID  oxygen, , inhalation, Continuous - Inhalation  senna, 10 mL, oral, BID  ticagrelor, 90 mg, oral, BID      Continuous medications  heparin, 0-4,000 Units/hr, Last Rate: 1,400 Units/hr (07/20/24 0040)      PRN medications  PRN medications: labetaloL, morphine, oxyCODONE, polyethylene glycol     Objective     Vitals  Visit Vitals  BP (!) 169/98   Pulse 84   Temp 36.4 °C (97.5 °F) (Temporal)   Resp 22   Ht 1.829 m (6')   Wt 116 kg (255 lb 4.7 oz)   SpO2 95%   BMI 34.62 kg/m²   Smoking Status Never   BSA 2.43 m²        Physical Examination:  Gen: In distress , morbidly obese   HEENT: AT/NC, no conjunctival pallor, anicteric sclera, EOMI   Neck: supple, no LAD, JVD  "present  Chest: Bilateral scattered coarse crackles all over the lung field mostly on the basal area  CVS: s1 & S2 only, no MGR  Abd: soft, flat, NT/ND, BS+  Ext: no cyanosis/clubbing , bilateral leg edema with papular rashes all over.  Neuro: Aox3, cn 2-12 intact, motor 5/5 globally, sensation intact    I/Os    Intake/Output Summary (Last 24 hours) at 7/20/2024 0416  Last data filed at 7/20/2024 0349  Gross per 24 hour   Intake --   Output 600 ml   Net -600 ml       Vent Settings         Labs:   Results from last 72 hours   Lab Units 07/19/24  1135   SODIUM mmol/L 137   POTASSIUM mmol/L 3.7   CHLORIDE mmol/L 106   CO2 mmol/L 24   BUN mg/dL 31*   CREATININE mg/dL 2.32*   GLUCOSE mg/dL 122*   CALCIUM mg/dL 8.3*   ANION GAP mmol/L 11   EGFR mL/min/1.73m*2 33*      Results from last 72 hours   Lab Units 07/19/24  1135   WBC AUTO x10*3/uL 8.7   HEMOGLOBIN g/dL 13.0*   HEMATOCRIT % 39.8*   PLATELETS AUTO x10*3/uL 328   NEUTROS PCT AUTO % 77.5   LYMPHS PCT AUTO % 12.7   MONOS PCT AUTO % 8.1   EOS PCT AUTO % 0.7      Lab Results   Component Value Date    CALCIUM 8.3 (L) 07/19/2024    PHOS 3.5 02/10/2023      No results found for: \"CRP\"   [unfilled]     Micro/ID:   No results found for the last 90 days.                   No lab exists for component: \"AGALPCRNB\"   .ID  Lab Results   Component Value Date    URINECULTURE No growth 03/15/2024    BLOODCULT Loaded on Instrument - Culture in progress 07/19/2024    BLOODCULT Loaded on Instrument - Culture in progress 07/19/2024         Images    US scrotum w doppler  Narrative: Interpreted By:  Miladis Ann,   STUDY:  US SCROTUM WITH DOPPLER;  7/19/2024 1:27 pm      INDICATION:  Signs/Symptoms:HYDROCELE/SCROTUM PAIN.      COMPARISON:  03/29/2024      ACCESSION NUMBER(S):  [id number]      ORDERING CLINICIAN:  TRACY MYERS      TECHNIQUE:  Multiple ultrasonographic images of scrotum and tested were obtained.      FINDINGS:  RIGHT HEMISCROTUM:      RIGHT TESTICLE:  The right " testicle measures 2.9 cm x 2.0 cmx 5.1 cm. The right  testicle demonstrates a homogeneous echotexture and normal contour.      Normal vascularity and Doppler waveforms are observed in the right  testicle.      RIGHT EPIDIDYMIS:  The right epididymal head measures 1.6 cm x 0.5 cm x 1.3 cm and is  within normal limits.      LEFT HEMISCROTUM:      LEFT TESTICLE:  The left testicle measures 3.3 cm x 1.5 cm x 4.0 cm. There are a  couple small calcifications within the left testicle.      Normal vascularity and Doppler waveforms are observed in the left  testicle.      There is a left hydrocele. There is a large left inguinal hernia.      LEFT EPIDIDYMIS:  The left epididymis was not visualized.      The scrotal contents are similar to the recent prior exam.      Impression: Large left inguinal hernia.      Symmetric arterial and venous flow to the testicles.      MACRO:  None      Signed by: Miladis Ann 7/19/2024 2:48 PM  Dictation workstation:   [id number]  XR chest 2 views  Narrative: Interpreted By:  Miladis Ann,   STUDY:  XR CHEST 2 VIEWS;  7/19/2024 11:08 am      INDICATION:  Signs/Symptoms:SOB.      COMPARISON:  03/29/2024      ACCESSION NUMBER(S):  [id number]      ORDERING CLINICIAN:  TRACY MYERS      FINDINGS:  The cardiac silhouette is enlarged.      There appears to be mild interstitial lung disease. There is no  obvious pleural fluid.      The mediastinum and bones are unremarkable. There is motion artifact.      COMPARISON OF FINDING:  The interstitial disease was not present previously.      Impression: Findings suggestive of pulmonary edema.      MACRO:  none      Signed by: Miladis Ann 7/19/2024 12:08 PM  Dictation workstation:   [id number]      Assessment and Plan    Problem list:   Principal Problem:    NSTEMI (non-ST elevated myocardial infarction) (Multi)      Iglesia Clarke is a 53 y.o. male with past medical history of HFrEF (EF 20-30% ), CKD ,  lung nodule, chronic anemia , left-sided  renal stone , inguinal hernia, hypertension who was a transfer from [facility]  for the evaluation and management of acute exacerbation of heart failure and concern for NSTEMI.       Acute Medical Issues     # AHRF 2/2 AOC exa of HF   # Combined systolic and diastolic heart failure  # Concern for NSTEMI    -Patient presented to the ED with increase shortness of breath, orthopnea, PND, HTN  -Initially labs showed BNP  2,172, troponin 115>>129>>136, D-dimer 521,  -CXR concerning for pulmonary edema  -EKG NSTEMI  -Patient was admitted last year for bilateral extensive leg swelling, echo done at that time showed EF 20 to 30%.   -Patient never followed up with any cardiologist nor took any medications.   -He was started on heparin drip [place] [facility] [place]  -VBG: Respiratory alkalosis  -Trops trending down, last 1 was 123    Plan  -Fluid and sodium restriction , Daily weight  -IV Lasix 20 mg stat, patient has CKD, so cautious about diuresis, should be assessed on daily basis and diuresis accordingly.  -Aspirin loading dose, Brilinta loading dose with maintenance dose  -Continue heparin drip  -Metoprolol 12.5 mg twice daily, hold for now due to acute exacerbation of heart failure.  -Hydralazine 10 mg every 6 as needed for SBP more than 160  -Cardiology consulted, appreciate recs  -N.p.o.  -Echo ordered  -Morning labs ordered.     # Concern for PE   -Progressive shortness of breath, bilateral legs swelling,  -High D-dimer 521.  -Currently on heparin drip for concern of NSTEMI  -Ordered USG bilateral lower extremity Doppler, pending  -Ordered VQ scan as patient CKD    #  ALESSANDRA on CKD  # Hypokalemia  - baseline 2.03, current Cr 2.32  - trending down, monitor.   - K replaced.     # Intermittent  claudication  -Hx  bilateral back of the leg muscle cramp, increases with walking, improves with rest  -Usually pain starts at 1 AM gradually worsening in intensity, becomes 9 out of 10 in the early morning.  -Has an  extensive smoking history of 1-3 PPD for 30 years, quit 7 years ago  -Needs further workup  -Vascular can be consulted for further evaluation and management.  -Pain regimen Tylenol 975 twice daily, oxycodone 5 mg every 6 as needed for moderate pain, morphine 0.5 mg q6prn for breakthrough pain.       Chronic Medical Issues     # Left sided kidney stone: outpatient follow up.  # Left sided ing hernia: needs outpatient follow up    IVF: PRN   Electrolytes: Replete as needed   Diet: NPO  GI ppx: none  DVT ppx: heparin gtt  Antibiotics: none  Lines: PIV  Code: DNR and No Intubation and No ICU       Disposition: 53 y.o.male admitted for SOB and bl leg pain, currently been managed for NSTEMI and acute exa of CHF, Cardiology consulted,  anticipate >48hr inpatient LOS.homero Garcia MD

## 2024-07-20 NOTE — PROGRESS NOTES
07/20/24 0833   Discharge Planning   Living Arrangements Alone   Support Systems Friends/neighbors   Assistance Needed A&OX3; independent with ADLs with no DME; drives; room air baseline currently 3L NC   Type of Residence Private residence   Number of Stairs to Enter Residence 3   Number of Stairs Within Residence 0   Do you have animals or pets at home? No   Who is requesting discharge planning? Provider   Home or Post Acute Services None   Expected Discharge Disposition Home  (Pending cardiology but patient currently denies any home going needs. Pt currently on 3L(room air baseline) and no anticoagulation prior to hospitalization)   Does the patient need discharge transport arranged? No   Financial Resource Strain   How hard is it for you to pay for the very basics like food, housing, medical care, and heating? Not hard   Housing Stability   In the last 12 months, was there a time when you were not able to pay the mortgage or rent on time? N   In the past 12 months, how many times have you moved where you were living? 1   At any time in the past 12 months, were you homeless or living in a shelter (including now)? N   Transportation Needs   In the past 12 months, has lack of transportation kept you from medical appointments or from getting medications? no   In the past 12 months, has lack of transportation kept you from meetings, work, or from getting things needed for daily living? No
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
## 2024-07-21 ENCOUNTER — HOSPITAL ENCOUNTER (OUTPATIENT)
Dept: CARDIOLOGY | Facility: HOSPITAL | Age: 54
Discharge: HOME | DRG: 291 | End: 2024-07-21
Payer: MEDICARE

## 2024-07-21 ENCOUNTER — APPOINTMENT (OUTPATIENT)
Dept: RADIOLOGY | Facility: HOSPITAL | Age: 54
DRG: 291 | End: 2024-07-21
Payer: MEDICARE

## 2024-07-21 VITALS
SYSTOLIC BLOOD PRESSURE: 111 MMHG | DIASTOLIC BLOOD PRESSURE: 67 MMHG | HEIGHT: 72 IN | BODY MASS INDEX: 34.54 KG/M2 | WEIGHT: 255 LBS | TEMPERATURE: 97.9 F | HEART RATE: 88 BPM | RESPIRATION RATE: 20 BRPM | OXYGEN SATURATION: 94 %

## 2024-07-21 PROBLEM — I21.4 NSTEMI (NON-ST ELEVATED MYOCARDIAL INFARCTION) (MULTI): Status: RESOLVED | Noted: 2024-07-19 | Resolved: 2024-07-21

## 2024-07-21 PROBLEM — N17.9 AKI (ACUTE KIDNEY INJURY) (CMS-HCC): Status: ACTIVE | Noted: 2024-03-30

## 2024-07-21 LAB
ALBUMIN SERPL BCP-MCNC: 3.3 G/DL (ref 3.4–5)
ALBUMIN SERPL BCP-MCNC: 3.3 G/DL (ref 3.4–5)
ALP SERPL-CCNC: 61 U/L (ref 33–120)
ALT SERPL W P-5'-P-CCNC: 12 U/L (ref 10–52)
ANION GAP SERPL CALC-SCNC: 14 MMOL/L (ref 10–20)
AST SERPL W P-5'-P-CCNC: 14 U/L (ref 9–39)
BACTERIA BLD CULT: NORMAL
BACTERIA BLD CULT: NORMAL
BASOPHILS # BLD AUTO: 0.07 X10*3/UL (ref 0–0.1)
BASOPHILS NFR BLD AUTO: 0.7 %
BILIRUB DIRECT SERPL-MCNC: 0.1 MG/DL (ref 0–0.3)
BILIRUB SERPL-MCNC: 0.3 MG/DL (ref 0–1.2)
BUN SERPL-MCNC: 32 MG/DL (ref 6–23)
CALCIUM SERPL-MCNC: 8.6 MG/DL (ref 8.6–10.3)
CARDIAC TROPONIN I PNL SERPL HS: 105 NG/L (ref 0–20)
CHLORIDE SERPL-SCNC: 105 MMOL/L (ref 98–107)
CO2 SERPL-SCNC: 24 MMOL/L (ref 21–32)
CREAT SERPL-MCNC: 2.11 MG/DL (ref 0.5–1.3)
EGFRCR SERPLBLD CKD-EPI 2021: 37 ML/MIN/1.73M*2
EOSINOPHIL # BLD AUTO: 0.2 X10*3/UL (ref 0–0.7)
EOSINOPHIL NFR BLD AUTO: 2.1 %
ERYTHROCYTE [DISTWIDTH] IN BLOOD BY AUTOMATED COUNT: 14 % (ref 11.5–14.5)
ERYTHROCYTE [DISTWIDTH] IN BLOOD BY AUTOMATED COUNT: 14 % (ref 11.5–14.5)
GLUCOSE SERPL-MCNC: 120 MG/DL (ref 74–99)
HCT VFR BLD AUTO: 42.2 % (ref 41–52)
HCT VFR BLD AUTO: 42.2 % (ref 41–52)
HGB BLD-MCNC: 13.7 G/DL (ref 13.5–17.5)
HGB BLD-MCNC: 13.7 G/DL (ref 13.5–17.5)
IMM GRANULOCYTES # BLD AUTO: 0.05 X10*3/UL (ref 0–0.7)
IMM GRANULOCYTES NFR BLD AUTO: 0.5 % (ref 0–0.9)
LYMPHOCYTES # BLD AUTO: 1.39 X10*3/UL (ref 1.2–4.8)
LYMPHOCYTES NFR BLD AUTO: 14.3 %
MAGNESIUM SERPL-MCNC: 2.12 MG/DL (ref 1.6–2.4)
MCH RBC QN AUTO: 30.6 PG (ref 26–34)
MCH RBC QN AUTO: 30.6 PG (ref 26–34)
MCHC RBC AUTO-ENTMCNC: 32.5 G/DL (ref 32–36)
MCHC RBC AUTO-ENTMCNC: 32.5 G/DL (ref 32–36)
MCV RBC AUTO: 94 FL (ref 80–100)
MCV RBC AUTO: 94 FL (ref 80–100)
MONOCYTES # BLD AUTO: 0.89 X10*3/UL (ref 0.1–1)
MONOCYTES NFR BLD AUTO: 9.1 %
NEUTROPHILS # BLD AUTO: 7.15 X10*3/UL (ref 1.2–7.7)
NEUTROPHILS NFR BLD AUTO: 73.3 %
NRBC BLD-RTO: 0 /100 WBCS (ref 0–0)
NRBC BLD-RTO: 0 /100 WBCS (ref 0–0)
PHOSPHATE SERPL-MCNC: 3.7 MG/DL (ref 2.5–4.9)
PLATELET # BLD AUTO: 355 X10*3/UL (ref 150–450)
PLATELET # BLD AUTO: 355 X10*3/UL (ref 150–450)
POTASSIUM SERPL-SCNC: 3.5 MMOL/L (ref 3.5–5.3)
PROT SERPL-MCNC: 6.6 G/DL (ref 6.4–8.2)
RBC # BLD AUTO: 4.47 X10*6/UL (ref 4.5–5.9)
RBC # BLD AUTO: 4.47 X10*6/UL (ref 4.5–5.9)
SODIUM SERPL-SCNC: 139 MMOL/L (ref 136–145)
UFH PPP CHRO-ACNC: 0.3 IU/ML
UFH PPP CHRO-ACNC: 0.4 IU/ML
WBC # BLD AUTO: 9.8 X10*3/UL (ref 4.4–11.3)
WBC # BLD AUTO: 9.8 X10*3/UL (ref 4.4–11.3)

## 2024-07-21 PROCEDURE — 70450 CT HEAD/BRAIN W/O DYE: CPT | Performed by: RADIOLOGY

## 2024-07-21 PROCEDURE — 82040 ASSAY OF SERUM ALBUMIN: CPT

## 2024-07-21 PROCEDURE — 85520 HEPARIN ASSAY: CPT

## 2024-07-21 PROCEDURE — 83735 ASSAY OF MAGNESIUM: CPT | Performed by: STUDENT IN AN ORGANIZED HEALTH CARE EDUCATION/TRAINING PROGRAM

## 2024-07-21 PROCEDURE — 2500000004 HC RX 250 GENERAL PHARMACY W/ HCPCS (ALT 636 FOR OP/ED)

## 2024-07-21 PROCEDURE — 2500000001 HC RX 250 WO HCPCS SELF ADMINISTERED DRUGS (ALT 637 FOR MEDICARE OP)

## 2024-07-21 PROCEDURE — 99232 SBSQ HOSP IP/OBS MODERATE 35: CPT | Performed by: NURSE PRACTITIONER

## 2024-07-21 PROCEDURE — 36415 COLL VENOUS BLD VENIPUNCTURE: CPT

## 2024-07-21 PROCEDURE — 36415 COLL VENOUS BLD VENIPUNCTURE: CPT | Performed by: STUDENT IN AN ORGANIZED HEALTH CARE EDUCATION/TRAINING PROGRAM

## 2024-07-21 PROCEDURE — 99239 HOSP IP/OBS DSCHRG MGMT >30: CPT | Performed by: STUDENT IN AN ORGANIZED HEALTH CARE EDUCATION/TRAINING PROGRAM

## 2024-07-21 PROCEDURE — 2500000005 HC RX 250 GENERAL PHARMACY W/O HCPCS

## 2024-07-21 PROCEDURE — 2500000001 HC RX 250 WO HCPCS SELF ADMINISTERED DRUGS (ALT 637 FOR MEDICARE OP): Performed by: NURSE PRACTITIONER

## 2024-07-21 PROCEDURE — 84484 ASSAY OF TROPONIN QUANT: CPT

## 2024-07-21 PROCEDURE — 2500000002 HC RX 250 W HCPCS SELF ADMINISTERED DRUGS (ALT 637 FOR MEDICARE OP, ALT 636 FOR OP/ED)

## 2024-07-21 PROCEDURE — 2500000001 HC RX 250 WO HCPCS SELF ADMINISTERED DRUGS (ALT 637 FOR MEDICARE OP): Performed by: STUDENT IN AN ORGANIZED HEALTH CARE EDUCATION/TRAINING PROGRAM

## 2024-07-21 PROCEDURE — 93005 ELECTROCARDIOGRAM TRACING: CPT

## 2024-07-21 PROCEDURE — 70450 CT HEAD/BRAIN W/O DYE: CPT

## 2024-07-21 PROCEDURE — 80069 RENAL FUNCTION PANEL: CPT | Performed by: STUDENT IN AN ORGANIZED HEALTH CARE EDUCATION/TRAINING PROGRAM

## 2024-07-21 PROCEDURE — 85025 COMPLETE CBC W/AUTO DIFF WBC: CPT | Performed by: STUDENT IN AN ORGANIZED HEALTH CARE EDUCATION/TRAINING PROGRAM

## 2024-07-21 PROCEDURE — 2500000004 HC RX 250 GENERAL PHARMACY W/ HCPCS (ALT 636 FOR OP/ED): Performed by: STUDENT IN AN ORGANIZED HEALTH CARE EDUCATION/TRAINING PROGRAM

## 2024-07-21 PROCEDURE — 94760 N-INVAS EAR/PLS OXIMETRY 1: CPT

## 2024-07-21 RX ORDER — CETIRIZINE HYDROCHLORIDE 10 MG/1
10 TABLET ORAL ONCE
Status: COMPLETED | OUTPATIENT
Start: 2024-07-21 | End: 2024-07-21

## 2024-07-21 RX ORDER — FUROSEMIDE 40 MG/1
40 TABLET ORAL DAILY
Qty: 30 TABLET | Refills: 0 | Status: SHIPPED | OUTPATIENT
Start: 2024-07-21 | End: 2024-08-20

## 2024-07-21 RX ORDER — LISINOPRIL 10 MG/1
10 TABLET ORAL DAILY
Qty: 30 TABLET | Refills: 0 | Status: SHIPPED | OUTPATIENT
Start: 2024-07-22 | End: 2024-08-21

## 2024-07-21 RX ORDER — GABAPENTIN 100 MG/1
100 CAPSULE ORAL NIGHTLY
Qty: 30 CAPSULE | Refills: 0 | Status: SHIPPED | OUTPATIENT
Start: 2024-07-21 | End: 2024-08-20

## 2024-07-21 RX ORDER — FUROSEMIDE 10 MG/ML
40 INJECTION INTRAMUSCULAR; INTRAVENOUS ONCE
Status: COMPLETED | OUTPATIENT
Start: 2024-07-21 | End: 2024-07-21

## 2024-07-21 RX ORDER — GABAPENTIN 100 MG/1
100 CAPSULE ORAL NIGHTLY
Status: DISCONTINUED | OUTPATIENT
Start: 2024-07-21 | End: 2024-07-21 | Stop reason: HOSPADM

## 2024-07-21 RX ORDER — AMLODIPINE BESYLATE 10 MG/1
10 TABLET ORAL DAILY
Qty: 30 TABLET | Refills: 0 | Status: SHIPPED | OUTPATIENT
Start: 2024-07-22 | End: 2024-08-21

## 2024-07-21 RX ORDER — NAPROXEN SODIUM 220 MG/1
81 TABLET, FILM COATED ORAL DAILY
Qty: 30 TABLET | Refills: 0 | Status: SHIPPED | OUTPATIENT
Start: 2024-07-22 | End: 2024-08-21

## 2024-07-21 RX ORDER — MORPHINE SULFATE 2 MG/ML
2 INJECTION, SOLUTION INTRAMUSCULAR; INTRAVENOUS ONCE
Status: COMPLETED | OUTPATIENT
Start: 2024-07-21 | End: 2024-07-21

## 2024-07-21 RX ORDER — METOPROLOL SUCCINATE 25 MG/1
25 TABLET, EXTENDED RELEASE ORAL DAILY
Qty: 30 TABLET | Refills: 0 | Status: SHIPPED | OUTPATIENT
Start: 2024-07-22 | End: 2024-08-21

## 2024-07-21 RX ADMIN — FUROSEMIDE 40 MG: 10 INJECTION, SOLUTION INTRAMUSCULAR; INTRAVENOUS at 17:48

## 2024-07-21 RX ADMIN — GABAPENTIN 100 MG: 100 CAPSULE ORAL at 17:48

## 2024-07-21 RX ADMIN — FUROSEMIDE 40 MG: 10 INJECTION, SOLUTION INTRAMUSCULAR; INTRAVENOUS at 08:34

## 2024-07-21 RX ADMIN — AMLODIPINE BESYLATE 10 MG: 10 TABLET ORAL at 08:34

## 2024-07-21 RX ADMIN — Medication 4 L/MIN: at 08:00

## 2024-07-21 RX ADMIN — LISINOPRIL 10 MG: 10 TABLET ORAL at 08:34

## 2024-07-21 RX ADMIN — CETIRIZINE HYDROCHLORIDE 10 MG: 10 TABLET, FILM COATED ORAL at 12:19

## 2024-07-21 RX ADMIN — ASPIRIN 81 MG 81 MG: 81 TABLET ORAL at 08:34

## 2024-07-21 RX ADMIN — MORPHINE SULFATE 2 MG: 2 INJECTION, SOLUTION INTRAMUSCULAR; INTRAVENOUS at 07:05

## 2024-07-21 RX ADMIN — TICAGRELOR 90 MG: 90 TABLET ORAL at 08:34

## 2024-07-21 RX ADMIN — METOPROLOL SUCCINATE 25 MG: 25 TABLET, EXTENDED RELEASE ORAL at 08:34

## 2024-07-21 ASSESSMENT — COGNITIVE AND FUNCTIONAL STATUS - GENERAL
MOBILITY SCORE: 23
CLIMB 3 TO 5 STEPS WITH RAILING: A LITTLE
DAILY ACTIVITIY SCORE: 24

## 2024-07-21 ASSESSMENT — PAIN SCALES - GENERAL: PAINLEVEL_OUTOF10: 0 - NO PAIN

## 2024-07-21 NOTE — CARE PLAN
The patient's goals for the shift include      The clinical goals for the shift include Maintain patient safety    Assumed care of patient at 0700. Patient stated an improvement in his vision. Denied SOB, pain, discomforts. Refuses oxygen, sating at 95% RA. BL lung sounds clear/diminished. No pitting edema noted to BLLE. No c/o voiced at this time. Will cont. To monitor.

## 2024-07-21 NOTE — DISCHARGE INSTRUCTIONS
Please take medications as directed. It is very important that you follow up with Dr. Petty and the cardiologist for ongoing medication adjustments.     If we can improve the strength of your heart then you may be able to have the hernia repaired.     Please follow-up with your primary care physician within one week to discuss this hospital stay.    Please call your primary care physician or return to the emergency department for new or worsening symptoms.

## 2024-07-21 NOTE — PROGRESS NOTES
Subjective Data:  Mr. Clarke is resting in bed, swelling has improved and breathing has improved. Complains of general fatigue    Overnight Events:    Had an episode of numbness states he did not feel well.      Objective Data:  Last Recorded Vitals:  Vitals:    07/21/24 0355 07/21/24 0515 07/21/24 0617 07/21/24 0828   BP: 143/66 135/71 124/85 155/89   BP Location: Right arm Right arm Right arm Right arm   Patient Position: Sitting Sitting Sitting Lying   Pulse: 96 73 72 75   Resp: 20 20 20 22   Temp: 36.9 °C (98.4 °F) 36.6 °C (97.9 °F) 36.8 °C (98.2 °F) 36.4 °C (97.5 °F)   TempSrc: Temporal Temporal Temporal Temporal   SpO2: 96% 94% 93% 95%   Weight:  116 kg (255 lb)     Height:           Last Labs:  CBC - 7/21/2024:  6:41 AM;  6:41 AM  9.8; 9.8 13.7; 13.7 355; 355    42.2; 42.2      CMP - 7/21/2024:  6:41 AM  8.6 6.6 14 --- 0.3   3.7 3.3; 3.3 12 61      PTT - 7/20/2024:  4:25 AM  1.1   11.9 47     TROPHS   Date/Time Value Ref Range Status   07/21/2024 06:41  0 - 20 ng/L Final   07/20/2024 07:02  0 - 20 ng/L Final     Comment:     Previous result verified on 7/19/2024 1516 on specimen/case [id number] called with component TRPHS for procedure Troponin I, High Sensitivity, Initial with value 115 ng/L.   07/20/2024 04:25  0 - 20 ng/L Final     Comment:     Previous result verified on 7/19/2024 1516 on specimen/case [id number] called with component TRPHS for procedure Troponin I, High Sensitivity, Initial with value 115 ng/L.     BNP   Date/Time Value Ref Range Status   07/19/2024 11:35 AM 2,172 0 - 99 pg/mL Final   02/10/2023 06:45 AM 2,060 0 - 99 pg/mL Final     Comment:     .  <100 pg/mL - Heart failure unlikely  100-299 pg/mL - Intermediate probability of acute heart  .               failure exacerbation. Correlate with clinical  .               context and patient history.    >=300 pg/mL - Heart Failure likely. Correlate with clinical  .               context and patient history.  BNP  testing is performed using different testing   methodology at [facility] than at other   system [facility]. Direct result comparisons should   only be made within the same method.     02/09/2023 08:52 AM 4,102 0 - 99 pg/mL Final     Comment:     .  <100 pg/mL - Heart failure unlikely  100-299 pg/mL - Intermediate probability of acute heart  .               failure exacerbation. Correlate with clinical  .               context and patient history.    >=300 pg/mL - Heart Failure likely. Correlate with clinical  .               context and patient history.  BNP testing is performed using different testing   methodology at [facility] than at other   system hospitals. Direct result comparisons should   only be made within the same method.       HGBA1C   Date/Time Value Ref Range Status   02/10/2023 06:45 AM 6.4 % Final     Comment:          Diagnosis of Diabetes-Adults   Non-Diabetic: < or = 5.6%   Increased risk for developing diabetes: 5.7-6.4%   Diagnostic of diabetes: > or = 6.5%  .       Monitoring of Diabetes                Age (y)     Therapeutic Goal (%)   Adults:          >18           <7.0   Pediatrics:    13-18           <7.5                   7-12           <8.0                   0- 6            7.5-8.5   American Diabetes Association. Diabetes Care 33(S1), Jan 2010.       LDLCALC   Date/Time Value Ref Range Status   07/20/2024 04:25  <=99 mg/dL Final     Comment:                                 Near   Borderline      AGE      Desirable  Optimal    High     High     Very High     0-19 Y     0 - 109     ---    110-129   >/= 130     ----    20-24 Y     0 - 119     ---    120-159   >/= 160     ----      >24 Y     0 -  99   100-129  130-159   160-189     >/=190       VLDL   Date/Time Value Ref Range Status   07/20/2024 04:25 AM 27 0 - 40 mg/dL Final   02/10/2023 06:45 AM 14 0 - 40 mg/dL Final   08/20/2021 09:30 AM 41 0 - 40 mg/dL Final   08/17/2020 05:03 PM 24 0 - 40 mg/dL Final       Last I/O:  I/O last 3 completed shifts:  In: 678.7 (5.9 mL/kg) [P.O.:490; I.V.:88.7 (0.8 mL/kg); IV Piggyback:100]  Out: 3875 (33.5 mL/kg) [Urine:3875 (0.9 mL/kg/hr)]  Weight: 115.7 kg     Past Cardiology Tests (Last 3 Years):  EKG:  Sinus rhythm with PVCs heart rate 94 bpm     Echo:  Echo 2/10/2023  1. Left ventricular systolic function is severely decreased with a 25-30% estimated ejection fraction.   2. Spectral Doppler shows an abnormal pattern of left ventricular diastolic filling.   3. There is moderate left ventricular hypertrophy.   4. There is mildly reduced right ventricular systolic function.   5. The left atrium is moderately dilated.    Ejection Fractions:  LVEF 35-30%  Cath:  No results found for this or any previous visit from the past 1095 days.    Stress Test:  No results found for this or any previous visit from the past 1095 days.    Cardiac Imaging:  No results found for this or any previous visit from the past 1095 days.      Inpatient Medications:  Scheduled medications   Medication Dose Route Frequency    acetaminophen  975 mg oral q8h    amLODIPine  10 mg oral Daily    aspirin  81 mg oral Daily    cetirizine  10 mg oral Once    furosemide  40 mg intravenous BID    lisinopril  10 mg oral Daily    metoprolol succinate XL  25 mg oral Daily    oxygen   inhalation Continuous - Inhalation    perflutren lipid microspheres  0.5-10 mL of dilution intravenous Once in imaging    perflutren protein A microsphere  0.5 mL intravenous Once in imaging    senna  10 mL oral BID    sulfur hexafluoride microsphr  2 mL intravenous Once in imaging    ticagrelor  90 mg oral BID     PRN medications   Medication    oxyCODONE    polyethylene glycol    trimethobenzamide     Continuous Medications   Medication Dose Last Rate    heparin  0-4,000 Units/hr 2,000 Units/hr (07/20/24 2300)       Physical Exam:  Physical Exam  Vitals reviewed.   HENT:      Head: Normocephalic.      Nose: Nose normal.   Eyes:      Pupils:  Pupils are equal, round, and reactive to light.   Cardiovascular:      Rate and Rhythm: Normal rate and regular rhythm.   Pulmonary:      Effort: Pulmonary effort is normal.      Breath sounds: Normal breath sounds.   Abdominal:      General: Abdomen is flat.      Palpations: Abdomen is soft.   Musculoskeletal:         General: Normal range of motion.      Cervical back: Normal range of motion.   Skin:     General: Skin is warm and dry.   Neurological:      General: No focal deficit present.      Mental Status: He is alert and oriented to person, place, and time.   Psychiatric:         Mood and Affect: Mood normal.    Extremities + 1 pedal edema         Assessment/Plan   Mr. Clarke is a very pleasant 53 year old gentleman with a history of HFrEF (LVEF 25-30%), HTN, former history of tobacco use,  medication non compliance and CKD, presented to the ER with increased shortness of breath and peripheral edema. Troponin elevated in setting of CKD and elevated BNP. Troponin secondary to demand ischemia in the setting of heart failure. Will continue with medical management      Plan      -continue  Metoprolol ER 25 mg daily   -continue  Lisinopril 10 mg daily   -continue Amlodipine   -echocardiogram   -continue Lasix 40 mg IV twice a day   -monitor creatine   -I&Os       Peripheral IV 07/20/24 20 G Left;Proximal;Ventral Forearm (Active)   Site Assessment Clean;Dry;Intact 07/20/24 2054   Dressing Status Dry;Clean 07/20/24 2054   Number of days: 1       Code Status:  DNR and No Intubation and No ICU    I spent  minutes in the professional and overall care of this patient.        Nathaly Edwards, APRN-CNP

## 2024-07-21 NOTE — SIGNIFICANT EVENT
Clinical Event Note    Event:    - Messaged by the bedside RN at 06:30 AM: Patient complaining his vision from both eyes change, endorsing blurriness. Also complaining of numbness and tinging on the lip, but comes and goes. Nausea on and off. Also wanted to leave AMA. No weakness or sensory loss in both upper extremities and lower extremities. /85, HR 72, 93% on RA, RR 20, Temp 36.9   - Went and evaluated the patient at bedside. According to the patient his vision is affected, he feels like sometimes thing are moving. Has on/off short term episodes where he has nausea without vomiting. Also endorsed numbness in lips. Denied any chest pain. Did have SOB. Stated he might be having an acute reaction to one of the meds that were started  - O/E:  Constitutional: Sitting at bedside, in acute distress, Short of breath, eyes closed  Cardiovascular: Regular rate and rhythm, S1, S2. No extra heart sounds or murmurs  Respiratory: Clear to auscultation bilaterally. No wheezing, rales or rhonchi. Good chest wall expansion  Abdomen: Soft, Nontender, Obese. Bowel sounds appreciated  Neurological: CNII-XII intact. Sensation grossly intact. Vision appropriatee, NIH otherwise negative  PLAN:  - New set of vitals as:SpO2 95% on RA, HR 67 bpm, /85 mmHg  - Ordered CBC with auto differentials  - Added LFTs to AM labs  - Ordered blood Tryptase levels  - Ordered blood histamine levels  - Ordered Trop I  - EKG ordered and reviewed: RBBB, no new changes. T wave inversion appreciated in previous EKGs. Rate 73 bpm  - Ordered Morphine 2 mg for acute SOB  - Ordered Cetirizine 10 mg   - No c/f Stroke, will defer ordering CT Head    Vital Signs:    Visit Vitals  /85 (BP Location: Right arm, Patient Position: Sitting)   Pulse 72   Temp 36.8 °C (98.2 °F) (Temporal)   Resp 20   Ht 1.829 m (6')   Wt 116 kg (255 lb)   SpO2 93%   BMI 34.58 kg/m²   Smoking Status Never   BSA 2.43 m²           Daily Mares MD  Internal Medicine, PGY-  2  07/21/24 at 6:53 AM

## 2024-07-21 NOTE — CARE PLAN
The patient's goals for the shift include    Patient will be able to rest during tonight.   The clinical goals for the shift include patient's heparin assay will be therapeutic by the end of this shift      Problem: Fall/Injury  Goal: Not fall by end of shift  Outcome: Progressing     Problem: Fall/Injury  Goal: Be free from injury by end of the shift  Outcome: Progressing     Problem: Fall/Injury  Goal: Verbalize understanding of personal risk factors for fall in the hospital  Outcome: Progressing     Problem: Fall/Injury  Goal: Verbalize understanding of risk factor reduction measures to prevent injury from fall in the home  Outcome: Progressing

## 2024-07-21 NOTE — DISCHARGE SUMMARY
Discharge Diagnosis  Decompensated HFrEF  Elevated troponin 2/2 demand ischemia   HTN  Inguinal hernia   CKD stage IIIb  Hypokalemia   RLS vs neuropathy     Issues Requiring Follow-Up  Cardiology follow up     PCP follow up     Healthy at home referral - multiple medication changes    Discharge Meds     Your medication list        START taking these medications        Instructions Last Dose Given Next Dose Due   amLODIPine 10 mg tablet  Commonly known as: Norvasc  Start taking on: July 22, 2024      Take 1 tablet (10 mg) by mouth once daily.       aspirin 81 mg chewable tablet  Start taking on: July 22, 2024      Chew 1 tablet (81 mg) once daily.       furosemide 40 mg tablet  Commonly known as: Lasix      Take 1 tablet (40 mg) by mouth once daily.       gabapentin 100 mg capsule  Commonly known as: Neurontin      Take 1 capsule (100 mg) by mouth once daily at bedtime.       lisinopril 10 mg tablet  Start taking on: July 22, 2024      Take 1 tablet (10 mg) by mouth once daily.       metoprolol succinate XL 25 mg 24 hr tablet  Commonly known as: Toprol-XL  Start taking on: July 22, 2024      Take 1 tablet (25 mg) by mouth once daily. Do not crush or chew.              CONTINUE taking these medications        Instructions Last Dose Given Next Dose Due   acetaminophen 500 mg tablet  Commonly known as: Tylenol           acetaminophen-codeine 300-30 mg tablet  Commonly known as: Tylenol w/ Codeine #3                     Where to Get Your Medications        These medications were sent to [facility]/pharmacy #[id number] [place] [address]      Phone: [phone]   amLODIPine 10 mg tablet  aspirin 81 mg chewable tablet  furosemide 40 mg tablet  gabapentin 100 mg capsule  lisinopril 10 mg tablet  metoprolol succinate XL 25 mg 24 hr tablet         Test Results Pending At Discharge  Pending Labs       No current pending labs.            Hospital Course   This is a 53-year-old male with  past medical history significant for heart failure with reduced ejection fraction, hypertension, history of nonadherence, and CKD who presented to the hospital with a chief complaint of shortness of breath.  Workup was consistent with decompensated heart failure and elevated troponin secondary to supply/demand mismatch.  Cardiology was consulted and assisted with diuresis.  Patient diuresed >3 L with significant improvement in respiratory status.  Initial consideration of left heart catheterization which was ultimately deferred for now.  Multiple medication adjustments made, discussion with patient regarding importance of adherence to his medications.  He has a history of nonadherence and continues to comments about potentially having a difficult time with this.  Patient was referred to healthy at home to try and increase education and ultimately adherence.  Close follow-up with PCP cardiology recommended.    Of note, patient complained of bilateral lower extremity pain that was worse at night.  Symptoms seem consistent with restless leg syndrome versus neuropathic pain.  Requested agent to help tolerate this at home, plan to trial gabapentin 100 mg nightly.  Risks/benefits discussed.  Patient understands to follow-up with his PCP regarding this.    Pertinent Physical Exam At Time of Discharge  Physical Exam  Constitutional: resting comfortably in bed, no acute distress   Eyes: clear sclera, making eye contact   Respiratory/Thorax: CTA bilaterally, no acute respiratory distress  Cardiovascular: regular rate and rhythm  Gastrointestinal: Nondistended, soft, non-tender  Musculoskeletal: no significant peripheral edema appreciated   Neurological: alert, answering questions appropriately, speech is clear and fluent   Psychological: Appropriate mood and behavior  Skin: Warm and dry      Outpatient Follow-Up  No future appointments.    Discussed with patient  Discussed with Dr. Good and resident team  Discussed with  cardiology who was okay with discharge home after 2nd dose of lasix today.     DC 45 min   Eloina Ledezma, DO

## 2024-07-22 LAB
ATRIAL RATE: 102 BPM
ATRIAL RATE: 84 BPM
ATRIAL RATE: 93 BPM
ATRIAL RATE: 94 BPM
P AXIS: 38 DEGREES
P AXIS: 39 DEGREES
P AXIS: 45 DEGREES
P AXIS: 50 DEGREES
P OFFSET: 163 MS
P OFFSET: 165 MS
P OFFSET: 166 MS
P OFFSET: 191 MS
P ONSET: 100 MS
P ONSET: 102 MS
P ONSET: 126 MS
P ONSET: 99 MS
PR INTERVAL: 184 MS
PR INTERVAL: 188 MS
Q ONSET: 192 MS
Q ONSET: 193 MS
Q ONSET: 194 MS
Q ONSET: 218 MS
QRS COUNT: 14 BEATS
QRS COUNT: 15 BEATS
QRS COUNT: 16 BEATS
QRS COUNT: 17 BEATS
QRS DURATION: 134 MS
QRS DURATION: 134 MS
QRS DURATION: 136 MS
QRS DURATION: 136 MS
QT INTERVAL: 404 MS
QT INTERVAL: 426 MS
QT INTERVAL: 432 MS
QT INTERVAL: 436 MS
QTC CALCULATION(BAZETT): 515 MS
QTC CALCULATION(BAZETT): 526 MS
QTC CALCULATION(BAZETT): 529 MS
QTC CALCULATION(BAZETT): 540 MS
QTC FREDERICIA: 482 MS
QTC FREDERICIA: 487 MS
QTC FREDERICIA: 493 MS
QTC FREDERICIA: 501 MS
R AXIS: -25 DEGREES
R AXIS: -3 DEGREES
R AXIS: 17 DEGREES
R AXIS: 54 DEGREES
T AXIS: -71 DEGREES
T AXIS: 11 DEGREES
T AXIS: 216 DEGREES
T AXIS: 253 DEGREES
T OFFSET: 396 MS
T OFFSET: 405 MS
T OFFSET: 409 MS
T OFFSET: 436 MS
VENTRICULAR RATE: 102 BPM
VENTRICULAR RATE: 84 BPM
VENTRICULAR RATE: 93 BPM
VENTRICULAR RATE: 94 BPM

## 2024-07-23 ENCOUNTER — PATIENT OUTREACH (OUTPATIENT)
Dept: HOME HEALTH SERVICES | Age: 54
End: 2024-07-23
Payer: MEDICARE

## 2024-07-23 NOTE — SIGNIFICANT EVENT
Follow Up Phone Call    Outgoing phone call    Spoke to: Iglesia Clarke Relationship:self   Phone number: [phone]      Outcome: I left a message on answering machine   No chief complaint on file.         Diagnosis:Not applicable

## 2024-07-24 LAB
BACTERIA BLD CULT: NORMAL
BACTERIA BLD CULT: NORMAL

## 2024-07-26 ENCOUNTER — OFFICE VISIT (OUTPATIENT)
Dept: CARDIOLOGY | Facility: CLINIC | Age: 54
End: 2024-07-26
Payer: MEDICARE

## 2024-07-26 VITALS
DIASTOLIC BLOOD PRESSURE: 107 MMHG | OXYGEN SATURATION: 97 % | SYSTOLIC BLOOD PRESSURE: 175 MMHG | HEIGHT: 72 IN | WEIGHT: 265 LBS | HEART RATE: 89 BPM | BODY MASS INDEX: 35.89 KG/M2

## 2024-07-26 DIAGNOSIS — I10 HYPERTENSION, UNSPECIFIED TYPE: Primary | ICD-10-CM

## 2024-07-26 DIAGNOSIS — N17.9 AKI (ACUTE KIDNEY INJURY) (CMS-HCC): ICD-10-CM

## 2024-07-26 DIAGNOSIS — I50.23 ACUTE ON CHRONIC SYSTOLIC HEART FAILURE (MULTI): ICD-10-CM

## 2024-07-26 DIAGNOSIS — R94.31 ABNORMAL EKG: ICD-10-CM

## 2024-07-26 PROCEDURE — G2211 COMPLEX E/M VISIT ADD ON: HCPCS | Performed by: STUDENT IN AN ORGANIZED HEALTH CARE EDUCATION/TRAINING PROGRAM

## 2024-07-26 PROCEDURE — 3077F SYST BP >= 140 MM HG: CPT | Performed by: STUDENT IN AN ORGANIZED HEALTH CARE EDUCATION/TRAINING PROGRAM

## 2024-07-26 PROCEDURE — 3080F DIAST BP >= 90 MM HG: CPT | Performed by: STUDENT IN AN ORGANIZED HEALTH CARE EDUCATION/TRAINING PROGRAM

## 2024-07-26 PROCEDURE — 99215 OFFICE O/P EST HI 40 MIN: CPT | Performed by: STUDENT IN AN ORGANIZED HEALTH CARE EDUCATION/TRAINING PROGRAM

## 2024-07-26 RX ORDER — LORAZEPAM 0.5 MG/1
1 TABLET ORAL
COMMUNITY
Start: 2024-07-22

## 2024-07-26 RX ORDER — METOPROLOL SUCCINATE 50 MG/1
50 TABLET, EXTENDED RELEASE ORAL DAILY
Qty: 30 TABLET | Refills: 11 | Status: SHIPPED | OUTPATIENT
Start: 2024-07-26 | End: 2025-07-26

## 2024-07-26 RX ORDER — LISINOPRIL 40 MG/1
40 TABLET ORAL DAILY
Qty: 30 TABLET | Refills: 11 | Status: SHIPPED | OUTPATIENT
Start: 2024-07-26 | End: 2025-07-26

## 2024-07-26 RX ORDER — FUROSEMIDE 40 MG/1
40 TABLET ORAL
Qty: 60 TABLET | Refills: 11 | Status: SHIPPED | OUTPATIENT
Start: 2024-07-26 | End: 2025-07-26

## 2024-07-26 NOTE — PROGRESS NOTES
Primary Care Physician: Nick Petty MD   Date of Visit: 07/26/2024  8:00 AM EDT  Type of Visit: new      Chief Complaint:  Chief Complaint   Patient presents with    New Patient Visit     Here for cardiac clearance for hernia surgery.        HPI  Iglesia Clarke 53 y.o. male with a history of HFrEF (LVEF 25-30%), HTN, former history of tobacco use, medication non compliance and CKD recnelty admitted with CHF exacerbation  No ischemic eval done    Never had ischemic eval done     Used to be heavy smoker, quit 7 years ago  Used to drink, quit a few ago     He did not take his pills today, takes it around 10 am  His bp usually high    He has le edema, takes lasix once a day    Normal weight 240-250 range    He gained at least 10 lb since dc   He has waldron, worsening since he left the hospital   No chest pain      Wants to get hernia surgery, which he has for at least 7 years    Review of Systems   Review of Systems   12 points review of systems are negative expect for the above    Social History:  Social History     Socioeconomic History    Marital status: Single     Spouse name: Not on file    Number of children: Not on file    Years of education: Not on file    Highest education level: Not on file   Occupational History    Not on file   Tobacco Use    Smoking status: Never    Smokeless tobacco: Never   Substance and Sexual Activity    Alcohol use: Not Currently    Drug use: Never    Sexual activity: Not on file   Other Topics Concern    Not on file   Social History Narrative    Not on file     Social Determinants of Health     Financial Resource Strain: Low Risk  (7/20/2024)    Overall Financial Resource Strain (CARDIA)     Difficulty of Paying Living Expenses: Not hard at all   Food Insecurity: No Food Insecurity (4/1/2024)    Received from [facility]    Hunger Vital Sign     Worried About Running Out of Food in the Last Year: Never true     Ran Out of Food in the Last Year: Never true   Transportation Needs:  No Transportation Needs (7/20/2024)    PRAPARE - Transportation     Lack of Transportation (Medical): No     Lack of Transportation (Non-Medical): No   Recent Concern: Transportation Needs - Unmet Transportation Needs (7/19/2024)    PRAPARE - Transportation     Lack of Transportation (Medical): Yes     Lack of Transportation (Non-Medical): No   Physical Activity: Not on file   Stress: Not on file   Social Connections: Not on file   Intimate Partner Violence: Not on file   Housing Stability: Low Risk  (7/20/2024)    Housing Stability Vital Sign     Unable to Pay for Housing in the Last Year: No     Number of Times Moved in the Last Year: 1     Homeless in the Last Year: No        Past Medical History:  Past Medical History:   Diagnosis Date    Kidney stones        Past Surgical History:  History reviewed. No pertinent surgical history.    Family History:  No family history on file.     Objective:       7/20/2024     7:40 PM 7/21/2024     3:55 AM 7/21/2024     5:15 AM 7/21/2024     6:17 AM 7/21/2024     8:28 AM 7/21/2024     2:09 PM 7/26/2024     8:10 AM   Vitals   Systolic 129 143 135 124 155 111 175   Diastolic 72 66 71 85 89 67 107   Heart Rate 76 96 73 72 75 88 89   Temp 37 °C (98.6 °F) 36.9 °C (98.4 °F) 36.6 °C (97.9 °F) 36.8 °C (98.2 °F) 36.4 °C (97.5 °F) 36.6 °C (97.9 °F)    Resp 20 20 20 20 22 20    Height (in)       1.829 m (6')   Weight (lb)   255    265   BMI   34.58 kg/m2    35.94 kg/m2   BSA (m2)   2.43 m2    2.47 m2   Visit Report       Report      Constitutional:       Appearance: Healthy appearance. Not in distress.   Neck:      Vascular: No JVR. JVD normal.   Pulmonary:      Effort: Pulmonary effort is normal.      Breath sounds: Normal breath sounds. No wheezing. No rhonchi. No rales.   Chest:      Chest wall: Not tender to palpatation.   Cardiovascular:      PMI at left midclavicular line. Normal rate. Regular rhythm. Normal S1. Normal S2.       Murmurs: There is no murmur.      No gallop.  No  click. No rub.   Pulses:     Intact distal pulses.   Edema:     Peripheral edema absent.   Abdominal:      General: Bowel sounds are normal.      Palpations: Abdomen is soft.      Tenderness: There is no abdominal tenderness.   Musculoskeletal: Normal range of motion.         General: No tenderness.   Skin:     General: Skin is warm and dry.   Neurological:      General: No focal deficit present.      Mental Status: Alert and oriented to person, place and time.     Allergies:  No Known Allergies    Medications:  Current Outpatient Medications   Medication Instructions    acetaminophen (TYLENOL) 500 mg, oral, Every 6 hours PRN    acetaminophen-codeine (Tylenol w/ Codeine #3) 300-30 mg tablet 1 tablet, oral, Every 8 hours PRN    amLODIPine (NORVASC) 10 mg, oral, Daily    aspirin 81 mg, oral, Daily    furosemide (LASIX) 40 mg, oral, Daily    gabapentin (NEURONTIN) 100 mg, oral, Nightly    lisinopril 10 mg, oral, Daily    LORazepam (Ativan) 0.5 mg tablet 1 tablet, oral, Every 12 hours scheduled (0630,1830)    metoprolol succinate XL (TOPROL-XL) 25 mg, oral, Daily, Do not crush or chew.        Labs and Imaging:     Lab Results   Component Value Date    WBC 9.8 07/21/2024    WBC 9.8 07/21/2024    HGB 13.7 07/21/2024    HGB 13.7 07/21/2024    HCT 42.2 07/21/2024    HCT 42.2 07/21/2024     07/21/2024     07/21/2024    CHOL 184 07/20/2024    TRIG 134 07/20/2024    HDL 42.9 07/20/2024    ALT 12 07/21/2024    AST 14 07/21/2024     07/21/2024    K 3.5 07/21/2024     07/21/2024    CREATININE 2.11 (H) 07/21/2024    BUN 32 (H) 07/21/2024    CO2 24 07/21/2024    TSH 1.01 07/19/2024    INR 1.1 07/20/2024    HGBA1C 6.4 (A) 02/10/2023         Echocardiogram:   Echocardiogram     [facility], [address], [place] [address]  Tel [phone] and Fax [phone]    TRANSTHORACIC ECHOCARDIOGRAM REPORT      Patient Name:     RANI ARCHER      [place] Physician:   [id number] Nancie  Sandy Persaud MD  Study Date:       2/10/2023          Referring Physician: SANIYA WALKER  MRN/PID:          [id number]           PCP:                 Nick Petty MD  Accession/Order#: [id number]          Department Location: [facility]  YOB: 1970         Fellow:  Gender:           M                  Nurse:               Bernadine Espino RN  Admit Date:       2/9/2023           Sonographer:         Manasa Orourke RVT,  RDMS, RDCS  Admission Status: Inpatient -        Additional Staff:  Routine  Height:           185.42 cm          CC Report to:        [facility]  Weight:           122.47 kg          Study Type:          Echocardiogram  BSA:              2.44 m2  Blood Pressure: 195 /102 mmHg    Diagnosis/ICD: I50.9-Heart failure, unspecified  Indication:    Congestive Heart Failure  Procedure/CPT: Echo Complete w Full Doppler-[id number]    Patient History:  Pertinent History: HTN, LE Edema, Dyspnea and CHF. Shortness of Breath.    Study Detail: The following Echo studies were performed: 2D and M-Mode.  Technically challenging study due to poor acoustic windows.  Definity used as a contrast agent for endocardial border  definition. Total contrast used for this procedure was 2 mL via IV  push. The patient was awake.      Critical Event  Critical Event: Test was completed as per department protocol.  Critical Finding: Decreased EF, Elevated BP.  Time Test was Completed: 8:15:52 AM  Notified: Dr. Persaud.  Attending notification time: 8:32:00 AM    PHYSICIAN INTERPRETATION:  Left Ventricle: The left ventricular systolic function is severely decreased, with an estimated ejection fraction of 25-30%. There is global hypokinesis of the left ventricle with minor regional variations. The left ventricular cavity size is mildly dilated. There is moderate left ventricular hypertrophy. Spectral Doppler shows an abnormal pattern of left ventricular diastolic filling.  Left Atrium: The left atrium is  moderately dilated.  Right Ventricle: The right ventricle is mildly enlarged. There is mildly reduced right ventricular systolic function.  Right Atrium: The right atrium is mildly dilated.  Aortic Valve: The aortic valve is probably trileaflet. There is no evidence of aortic valve regurgitation. The peak instantaneous gradient of the aortic valve is 6.0 mmHg.  Mitral Valve: The mitral valve is normal in structure. There is mild mitral valve regurgitation.  Tricuspid Valve: The tricuspid valve is structurally normal. There is mild tricuspid regurgitation.  Pulmonic Valve: The pulmonic valve is not well visualized. There is physiologic pulmonic valve regurgitation.  Pericardium: There is no pericardial effusion noted.  Aorta: The aortic root was not well visualized.      CONCLUSIONS:  1. Left ventricular systolic function is severely decreased with a 25-30% estimated ejection fraction.  2. Spectral Doppler shows an abnormal pattern of left ventricular diastolic filling.  3. There is moderate left ventricular hypertrophy.  4. There is mildly reduced right ventricular systolic function.  5. The left atrium is moderately dilated.    QUANTITATIVE DATA SUMMARY:  2D MEASUREMENTS:  Normal Ranges:  LAs:           4.93 cm    (2.7-4.0cm)  IVSd:          2.50 cm    (0.6-1.1cm)  LVPWd:         2.64 cm    (0.6-1.1cm)  LVIDd:         5.64 cm    (3.9-5.9cm)  LVIDs:         4.77 cm  LV Mass Index: 365.3 g/m2  LV % FS        15.6 %    LA VOLUME:  Normal Ranges:  LA Vol A4C:        119.1 ml   (22+/-6mL/m2)  LA Vol A2C:        151.8 ml  LA Vol BP:         134.5 ml  LA Vol Index A4C:  48.7ml/m2  LA Vol Index A2C:  62.1 ml/m2  LA Vol Index BP:   55.0 ml/m2  LA Area A4C:       31.1 cm2  LA Area A2C:       35.1 cm2  LA Major Axis A4C: 6.9 cm  LA Major Axis A2C: 6.9 cm  LA Volume Index:   54.7 ml/m2  LA Vol A4C:        111.6 ml  LA Vol A2C:        141.4 ml    RA VOLUME BY A/L METHOD:  Normal Ranges:  RA Vol A4C:        102.1 ml    (8.3-19.5ml)  RA Vol Index A4C:  41.8 ml/m2  RA Area A4C:       29.0 cm2  RA Major Axis A4C: 7.0 cm    M-MODE MEASUREMENTS:  Normal Ranges:  Ao Root: 3.20 cm (2.0-3.7cm)  LAs:     6.49 cm (2.7-4.0cm)    AORTA MEASUREMENTS:  Normal Ranges:  Ao Sinus, d: 3.30 cm (2.1-3.5cm)  Asc Ao, d:   3.30 cm (2.1-3.4cm)    LV SYSTOLIC FUNCTION BY 2D PLANIMETRY (MOD):  Normal Ranges:  EF-A4C View: 24.7 % (>=55%)  EF-A2C View: 26.7 %  EF-Biplane:  29.0 %    LV DIASTOLIC FUNCTION:  Normal Ranges:  MV Peak E:    0.87 m/s    (0.7-1.2 m/s)  MV Peak A:    0.39 m/s    (0.42-0.7 m/s)  E/A Ratio:    2.22        (1.0-2.2)  MV e'         0.08 m/s    (>8.0)  MV lateral e' 0.11 m/s  MV medial e'  0.05 m/s  MV A Dur:     103.81 msec  E/e' Ratio:   10.90       (<8.0)    MITRAL VALVE:  Normal Ranges:  MV DT: 108 msec (150-240msec)    MITRAL INSUFFICIENCY:  Normal Ranges:  dP/dt: 4661 mmHg/s (>1200mmHg/sec)    AORTIC VALVE:  Normal Ranges:  AoV Vmax:      1.23 m/s (<=1.7m/s)  AoV Peak P.0 mmHg (<20mmHg)  LVOT Max Isidro:  0.93 m/s (<=1.1m/s)  LVOT VTI:      16.17 cm  LVOT Diameter: 2.19 cm  (1.8-2.4cm)  AoV Area,Vmax: 2.86 cm2 (2.5-4.5cm2)    RIGHT VENTRICLE:  RV 1   5.1 cm  RV 2   3.7 cm  RV 3   8.1 cm  TAPSE: 17.0 mm  RV s'  0.16 m/s    TRICUSPID VALVE/RVSP:  Normal Ranges:  Peak TR Velocity: 3.36 m/s  RV Syst Pressure: 48.1 mmHg (< 30mmHg)    PULMONIC VALVE:  Normal Ranges:  PV Max Isidro: 1.0 m/s  (0.6-0.9m/s)  PV Max P.1 mmHg    AORTA:  Asc Ao Diam 3.25 cm      [id number] Nancie Persaud MD  Electronically signed on 2/10/2023 at 9:12:42 AM         Final     Stress Testing: No results found for this or any previous visit from the past  days.    Cardiac Catheterization: No results found for this or any previous visit from the past  days.    Cardiac Scoring: No results found for this or any previous visit from the past  days.    AAA : No results found for this or any previous visit from the past  days.    OTHER: No results  found for this or any previous visit from the past 1825 days.      The ASCVD Risk score (Aishwarya SMILEY, et al., 2019) failed to calculate for the following reasons:    The patient has a prior MI or stroke diagnosis     Assessment/Plan:   1. Hypertension, unspecified type        2. Acute on chronic systolic heart failure (Multi)  Referral to Cardiology      3. ALESSANDRA (acute kidney injury) (CMS-[facility])           Iglesia Clarke 53 y.o. male with a history of HFrEF (LVEF 25-30%) echo done 2/2023, HTN, former history of tobacco use, medication non compliance and CKD suspected atrophic RT kidney recently admitted with CHF exacerbation  No ischemic eval done    RCRI 2, high risk    Volume up  BP is very high     Plan  -Ensure medication compliance  -increase Metoprolol ER 50 mg daily   -Increase Lisinopril 40 mg daily   -Continue Amlodipine  -increase lasix 40 bid  -can't initiate aldactone or SGLT2 given ALESSANDRA   -get the echo ordered done   -defer cath given high risk for TUNG  -advised to see his PCP next week with BP numbers   -refer to nephrology  -advised to go to the ED is symptoms worsen    Time Spent: I spent  minutes reviewing medical testing, obtaining medical history and counselling and educating on diagnosis and documenting clinical encounter.       high complex   ____________________________________________________________  Chet Brady MD   of Medicine  Division of Cardiovascular Medicine   [facility]  [facility]

## 2024-07-28 LAB
ATRIAL RATE: 73 BPM
P OFFSET: 139 MS
P ONSET: 101 MS
PR INTERVAL: 186 MS
Q ONSET: 194 MS
QRS COUNT: 12 BEATS
QRS DURATION: 132 MS
QT INTERVAL: 480 MS
QTC CALCULATION(BAZETT): 528 MS
QTC FREDERICIA: 512 MS
R AXIS: 206 DEGREES
T AXIS: -46 DEGREES
T OFFSET: 434 MS
VENTRICULAR RATE: 73 BPM

## 2024-07-30 ENCOUNTER — HOSPITAL ENCOUNTER (OUTPATIENT)
Dept: CARDIOLOGY | Facility: HOSPITAL | Age: 54
Discharge: HOME | End: 2024-07-30
Payer: MEDICARE

## 2024-07-30 VITALS — DIASTOLIC BLOOD PRESSURE: 89 MMHG | SYSTOLIC BLOOD PRESSURE: 153 MMHG

## 2024-07-30 DIAGNOSIS — I50.23 ACUTE ON CHRONIC SYSTOLIC HEART FAILURE (MULTI): ICD-10-CM

## 2024-07-30 LAB
AORTIC VALVE PEAK VELOCITY: 1.52 M/S
AV PEAK GRADIENT: 9.2 MMHG
AVA (PEAK VEL): 2.23 CM2
EJECTION FRACTION APICAL 4 CHAMBER: 40.8
EJECTION FRACTION: 38 %
LEFT ATRIUM VOLUME AREA LENGTH INDEX BSA: 46 ML/M2
LEFT VENTRICLE INTERNAL DIMENSION DIASTOLE: 6.97 CM (ref 3.5–6)
LEFT VENTRICULAR OUTFLOW TRACT DIAMETER: 2.16 CM
MITRAL VALVE E/A RATIO: 2.69
RIGHT VENTRICLE FREE WALL PEAK S': 11 CM/S
RIGHT VENTRICLE PEAK SYSTOLIC PRESSURE: 38.1 MMHG
TRICUSPID ANNULAR PLANE SYSTOLIC EXCURSION: 2.7 CM

## 2024-07-30 PROCEDURE — 93306 TTE W/DOPPLER COMPLETE: CPT

## 2024-07-30 PROCEDURE — 2500000004 HC RX 250 GENERAL PHARMACY W/ HCPCS (ALT 636 FOR OP/ED): Performed by: INTERNAL MEDICINE

## 2024-07-30 PROCEDURE — 93306 TTE W/DOPPLER COMPLETE: CPT | Performed by: INTERNAL MEDICINE

## 2024-07-31 ENCOUNTER — APPOINTMENT (OUTPATIENT)
Dept: SURGERY | Facility: CLINIC | Age: 54
End: 2024-07-31
Payer: MEDICARE

## 2024-07-31 VITALS
WEIGHT: 261 LBS | HEART RATE: 79 BPM | SYSTOLIC BLOOD PRESSURE: 146 MMHG | HEIGHT: 72 IN | DIASTOLIC BLOOD PRESSURE: 93 MMHG | BODY MASS INDEX: 35.35 KG/M2 | TEMPERATURE: 98.3 F

## 2024-07-31 DIAGNOSIS — K40.30 INCARCERATED LEFT INGUINAL HERNIA: ICD-10-CM

## 2024-07-31 DIAGNOSIS — K40.30 INCARCERATED LEFT INGUINAL HERNIA: Primary | ICD-10-CM

## 2024-07-31 DIAGNOSIS — N18.2 STAGE 2 CHRONIC KIDNEY DISEASE: ICD-10-CM

## 2024-07-31 DIAGNOSIS — R93.1 DECREASED CARDIAC EJECTION FRACTION: ICD-10-CM

## 2024-07-31 DIAGNOSIS — I50.33 ACUTE ON CHRONIC DIASTOLIC CONGESTIVE HEART FAILURE (MULTI): ICD-10-CM

## 2024-07-31 PROCEDURE — 3077F SYST BP >= 140 MM HG: CPT | Performed by: SURGERY

## 2024-07-31 PROCEDURE — 1036F TOBACCO NON-USER: CPT | Performed by: SURGERY

## 2024-07-31 PROCEDURE — 3080F DIAST BP >= 90 MM HG: CPT | Performed by: SURGERY

## 2024-07-31 PROCEDURE — 99205 OFFICE O/P NEW HI 60 MIN: CPT | Performed by: SURGERY

## 2024-07-31 NOTE — PROGRESS NOTES
Subjective   Patient ID: Iglesia Clarke is a 53 y.o. male who presents for a large left inguinal hernia    HPI   This a patient with a left inguinal hernia that has had for many years.  It has been increasing in size.  He has some discomfort with this.  He has no abdominal complaints.  The patient has significant cardiac dysfunction with an ejection fraction of 25 to 30% and acute on chronic systolic heart failure.  He also has chronic renal insufficiency.      Past Medical History:   Diagnosis Date    Kidney stones         Current Outpatient Medications on File Prior to Visit   Medication Sig Dispense Refill    acetaminophen (Tylenol) 500 mg tablet Take 1 tablet (500 mg) by mouth every 6 hours if needed for mild pain (1 - 3).      acetaminophen-codeine (Tylenol w/ Codeine #3) 300-30 mg tablet Take 1 tablet by mouth every 8 hours if needed for severe pain (7 - 10).      amLODIPine (Norvasc) 10 mg tablet Take 1 tablet (10 mg) by mouth once daily. 30 tablet 0    aspirin 81 mg chewable tablet Chew 1 tablet (81 mg) once daily. 30 tablet 0    furosemide (Lasix) 40 mg tablet Take 1 tablet (40 mg) by mouth once daily. 30 tablet 0    furosemide (Lasix) 40 mg tablet Take 1 tablet (40 mg) by mouth 2 times daily (morning and late afternoon). 60 tablet 11    gabapentin (Neurontin) 100 mg capsule Take 1 capsule (100 mg) by mouth once daily at bedtime. 30 capsule 0    lisinopril 10 mg tablet Take 1 tablet (10 mg) by mouth once daily. (Patient not taking: Reported on 7/31/2024) 30 tablet 0    lisinopril 40 mg tablet Take 1 tablet (40 mg) by mouth once daily. 30 tablet 11    LORazepam (Ativan) 0.5 mg tablet Take 1 tablet (0.5 mg) by mouth every 12 hours.      metoprolol succinate XL (Toprol XL) 50 mg 24 hr tablet Take 1 tablet (50 mg) by mouth once daily. Do not crush or chew. 30 tablet 11    metoprolol succinate XL (Toprol-XL) 25 mg 24 hr tablet Take 1 tablet (25 mg) by mouth once daily. Do not crush or chew. (Patient not taking:  Reported on 7/31/2024) 30 tablet 0     No current facility-administered medications on file prior to visit.        Review of Systems   All other systems reviewed and are negative.      Vitals:    07/31/24 1525   BP: (!) 146/93   Pulse: 79   Temp: 36.8 °C (98.3 °F)        Objective     Physical Exam  Vitals reviewed.   Constitutional:       Appearance: Normal appearance.   HENT:      Head: Normocephalic.   Cardiovascular:      Rate and Rhythm: Normal rate and regular rhythm.      Heart sounds: Normal heart sounds.   Pulmonary:      Effort: Pulmonary effort is normal.      Breath sounds: Normal breath sounds.   Abdominal:      General: Abdomen is flat. There is no distension.      Palpations: Abdomen is soft. There is no mass.      Tenderness: There is no abdominal tenderness. There is no guarding.      Hernia: A hernia is present. Hernia is present in the left inguinal area.      Comments: Large incarcerated left inguinal hernia extending into the scrotum.   Musculoskeletal:         General: Normal range of motion.      Cervical back: Normal range of motion.   Skin:     General: Skin is warm.   Neurological:      General: No focal deficit present.   Psychiatric:         Mood and Affect: Mood normal.       Problem List Items Addressed This Visit       Diastolic congestive heart failure (Multi)    Stage 2 chronic kidney disease    Incarcerated left inguinal hernia - Primary    Decreased cardiac ejection fraction        Assessment/Plan   Symptomatic incarcerated left inguinal hernia with congestive heart failure with decreased ejection fraction and chronic renal insufficiency.  -Discussed the finds at length with the patient.  He represents a high risk at this time for repair of his left inguinal hernia.  He requires evaluation and tissue care center.  We will refer him to the hernia center.    Zac Ramírez MD

## 2024-07-31 NOTE — PATIENT INSTRUCTIONS
You have a large left inguinal hernia.  You also have significant cardiac disease.  Will have to refer you to a tertiary care center to have follow-up of your heart and discussion regarding repair of your left inguinal hernia.  My staff will take care of the referral to the hernia center.

## 2024-08-01 ENCOUNTER — TELEPHONE (OUTPATIENT)
Dept: SURGERY | Facility: CLINIC | Age: 54
End: 2024-08-01
Payer: MEDICARE

## 2024-08-01 NOTE — TELEPHONE ENCOUNTER
----- Message from Martha Russell sent at 2024  8:19 AM EDT -----  Regarding: FW: Let me know who you sechedule him with at [facility]  Make sure you follow this unil finished as this information is not in the chart or the provider  ----- Message -----  From: Arianna Shaw CMA  Sent: 2024   5:03 PM EDT  To: Zac Ramírez MD; Martha Russell; #  Subject: RE: Let me know who you sechedule him with a#    Tried to make and apt with Dr. Grady Mooney [facility] pt. Has the number to call and will call us back when scheduled.  At   at [facility] something wrong with his  on his INS card for them needs fixed first before he can be schedule with him  ----- Message -----  From: Zac Ramírez MD  Sent: 2024   4:18 PM EDT  To: Arianna Shaw CMA  Subject: Let me know who you sechedule him with at [facility]

## 2024-08-02 ENCOUNTER — TELEPHONE (OUTPATIENT)
Dept: SURGERY | Facility: CLINIC | Age: 54
End: 2024-08-02
Payer: MEDICARE

## 2024-08-02 NOTE — TELEPHONE ENCOUNTER
----- Message from Martha Russell sent at 2024  8:19 AM EDT -----  Regarding: FW: Let me know who you sechedule him with at [facility]  Make sure you follow this unil finished as this information is not in the chart or the provider  ----- Message -----  From: Arianna Shaw CMA  Sent: 2024   5:03 PM EDT  To: Zac Ramírez MD; Martha Russell; #  Subject: RE: Let me know who you sechedule him with a#    Tried to make and apt with Dr. Grady Mooney [facility] pt. Has the number to call and will call us back when scheduled.  At   at [facility] something wrong with his  on his INS card for them needs fixed first before he can be schedule with him  ----- Message -----  From: Zac Ramírez MD  Sent: 2024   4:18 PM EDT  To: Arianna Shaw CMA  Subject: Let me know who you sechedule him with at [facility]

## 2024-08-05 ENCOUNTER — TELEPHONE (OUTPATIENT)
Dept: SURGERY | Facility: CLINIC | Age: 54
End: 2024-08-05
Payer: MEDICARE

## 2024-08-05 NOTE — TELEPHONE ENCOUNTER
----- Message from Martha Russell sent at 2024  8:19 AM EDT -----  Regarding: FW: Let me know who you sechedule him with at [facility]  Make sure you follow this unil finished as this information is not in the chart or the provider  ----- Message -----  From: Arianna Shaw CMA  Sent: 2024   5:03 PM EDT  To: Zac Ramírez MD; Martha Russell; #  Subject: RE: Let me know who you sechedule him with a#    Tried to make and apt with Dr. Grady Mooney [facility] pt. Has the number to call and will call us back when scheduled.  At   at [facility] something wrong with his  on his INS card for them needs fixed first before he can be schedule with him  ----- Message -----  From: Zac Ramírez MD  Sent: 2024   4:18 PM EDT  To: Arianna Shaw CMA  Subject: Let me know who you sechedule him with at [facility]

## 2024-08-21 NOTE — ED PROCEDURE NOTE
Procedure  Critical Care    Performed by: Faustina Monreal MD  Authorized by: Faustina Monreal MD    Critical care provider statement:     Critical care time (minutes):  30    Critical care time was exclusive of:  Separately billable procedures and treating other patients    Critical care was necessary to treat or prevent imminent or life-threatening deterioration of the following conditions:  Cardiac failure    Critical care was time spent personally by me on the following activities:  Development of treatment plan with patient or surrogate, discussions with consultants, evaluation of patient's response to treatment, examination of patient, obtaining history from patient or surrogate, ordering and review of laboratory studies, ordering and review of radiographic studies, pulse oximetry, re-evaluation of patient's condition and review of old charts    Care discussed with: accepting provider at another facility                 Faustina Monreal MD  08/21/24 8037

## 2024-08-23 ENCOUNTER — APPOINTMENT (OUTPATIENT)
Dept: CARDIOLOGY | Facility: CLINIC | Age: 54
End: 2024-08-23
Payer: MEDICARE

## 2024-09-13 LAB
ATRIAL RATE: 102 BPM
P AXIS: 50 DEGREES
P OFFSET: 165 MS
P ONSET: 102 MS
PR INTERVAL: 184 MS
Q ONSET: 194 MS
QRS COUNT: 17 BEATS
QRS DURATION: 134 MS
QT INTERVAL: 404 MS
QTC CALCULATION(BAZETT): 526 MS
QTC FREDERICIA: 482 MS
R AXIS: -3 DEGREES
T AXIS: 11 DEGREES
T OFFSET: 396 MS
VENTRICULAR RATE: 102 BPM

## 2024-11-01 ENCOUNTER — LAB REQUISITION (OUTPATIENT)
Dept: LAB | Facility: HOSPITAL | Age: 54
End: 2024-11-01
Payer: MEDICARE

## 2024-11-01 DIAGNOSIS — N18.9 CHRONIC KIDNEY DISEASE, UNSPECIFIED: ICD-10-CM

## 2024-11-01 DIAGNOSIS — I10 ESSENTIAL (PRIMARY) HYPERTENSION: ICD-10-CM

## 2024-11-01 LAB
ALBUMIN SERPL BCP-MCNC: 3.8 G/DL (ref 3.4–5)
ALP SERPL-CCNC: 56 U/L (ref 33–120)
ALT SERPL W P-5'-P-CCNC: 13 U/L (ref 10–52)
ANION GAP SERPL CALC-SCNC: 13 MMOL/L (ref 10–20)
AST SERPL W P-5'-P-CCNC: 20 U/L (ref 9–39)
BASOPHILS # BLD AUTO: 0.06 X10*3/UL (ref 0–0.1)
BASOPHILS NFR BLD AUTO: 0.8 %
BILIRUB SERPL-MCNC: 0.3 MG/DL (ref 0–1.2)
BUN SERPL-MCNC: 36 MG/DL (ref 6–23)
CALCIUM SERPL-MCNC: 8.9 MG/DL (ref 8.6–10.3)
CHLORIDE SERPL-SCNC: 104 MMOL/L (ref 98–107)
CHOLEST SERPL-MCNC: 236 MG/DL (ref 0–199)
CHOLESTEROL/HDL RATIO: 6.3
CO2 SERPL-SCNC: 25 MMOL/L (ref 21–32)
CREAT SERPL-MCNC: 2.72 MG/DL (ref 0.5–1.3)
EGFRCR SERPLBLD CKD-EPI 2021: 27 ML/MIN/1.73M*2
EOSINOPHIL # BLD AUTO: 0.12 X10*3/UL (ref 0–0.7)
EOSINOPHIL NFR BLD AUTO: 1.6 %
ERYTHROCYTE [DISTWIDTH] IN BLOOD BY AUTOMATED COUNT: 15.2 % (ref 11.5–14.5)
GLUCOSE SERPL-MCNC: 110 MG/DL (ref 74–99)
HCT VFR BLD AUTO: 41.6 % (ref 41–52)
HDLC SERPL-MCNC: 37.7 MG/DL
HGB BLD-MCNC: 13.9 G/DL (ref 13.5–17.5)
IMM GRANULOCYTES # BLD AUTO: 0.02 X10*3/UL (ref 0–0.7)
IMM GRANULOCYTES NFR BLD AUTO: 0.3 % (ref 0–0.9)
LDLC SERPL CALC-MCNC: 125 MG/DL
LYMPHOCYTES # BLD AUTO: 1.78 X10*3/UL (ref 1.2–4.8)
LYMPHOCYTES NFR BLD AUTO: 23.1 %
MCH RBC QN AUTO: 30.5 PG (ref 26–34)
MCHC RBC AUTO-ENTMCNC: 33.4 G/DL (ref 32–36)
MCV RBC AUTO: 91 FL (ref 80–100)
MONOCYTES # BLD AUTO: 1.04 X10*3/UL (ref 0.1–1)
MONOCYTES NFR BLD AUTO: 13.5 %
NEUTROPHILS # BLD AUTO: 4.69 X10*3/UL (ref 1.2–7.7)
NEUTROPHILS NFR BLD AUTO: 60.7 %
NON HDL CHOLESTEROL: 198 MG/DL (ref 0–149)
NRBC BLD-RTO: 0 /100 WBCS (ref 0–0)
PLATELET # BLD AUTO: 312 X10*3/UL (ref 150–450)
POTASSIUM SERPL-SCNC: 4 MMOL/L (ref 3.5–5.3)
PROT SERPL-MCNC: 6.5 G/DL (ref 6.4–8.2)
RBC # BLD AUTO: 4.56 X10*6/UL (ref 4.5–5.9)
SODIUM SERPL-SCNC: 138 MMOL/L (ref 136–145)
TRIGL SERPL-MCNC: 368 MG/DL (ref 0–149)
TSH SERPL-ACNC: 0.8 MIU/L (ref 0.44–3.98)
VLDL: 74 MG/DL (ref 0–40)
WBC # BLD AUTO: 7.7 X10*3/UL (ref 4.4–11.3)

## 2024-11-01 PROCEDURE — 85025 COMPLETE CBC W/AUTO DIFF WBC: CPT

## 2024-11-01 PROCEDURE — 80053 COMPREHEN METABOLIC PANEL: CPT

## 2024-11-01 PROCEDURE — 80061 LIPID PANEL: CPT

## 2024-11-01 PROCEDURE — 84443 ASSAY THYROID STIM HORMONE: CPT

## 2024-11-18 ENCOUNTER — HOSPITAL ENCOUNTER (OUTPATIENT)
Dept: RADIOLOGY | Facility: HOSPITAL | Age: 54
Discharge: HOME | End: 2024-11-18
Payer: MEDICARE

## 2024-11-18 DIAGNOSIS — R91.1 SOLITARY PULMONARY NODULE: ICD-10-CM

## 2024-11-18 PROCEDURE — 71250 CT THORAX DX C-: CPT

## 2024-11-18 PROCEDURE — 71250 CT THORAX DX C-: CPT | Performed by: RADIOLOGY

## 2024-12-10 ENCOUNTER — APPOINTMENT (OUTPATIENT)
Dept: NEPHROLOGY | Facility: CLINIC | Age: 54
End: 2024-12-10
Payer: MEDICARE

## 2024-12-26 ENCOUNTER — APPOINTMENT (OUTPATIENT)
Dept: CARDIOLOGY | Facility: HOSPITAL | Age: 54
End: 2024-12-26
Payer: MEDICARE

## 2024-12-26 ENCOUNTER — HOSPITAL ENCOUNTER (INPATIENT)
Facility: HOSPITAL | Age: 54
LOS: 1 days | Discharge: LEFT BEFORE DISCHARGE | End: 2024-12-27
Attending: EMERGENCY MEDICINE | Admitting: INTERNAL MEDICINE
Payer: MEDICARE

## 2024-12-26 ENCOUNTER — APPOINTMENT (OUTPATIENT)
Dept: RADIOLOGY | Facility: HOSPITAL | Age: 54
End: 2024-12-26
Payer: MEDICARE

## 2024-12-26 DIAGNOSIS — I50.23 ACUTE ON CHRONIC SYSTOLIC HEART FAILURE: Primary | ICD-10-CM

## 2024-12-26 DIAGNOSIS — R09.02 HYPOXIA: ICD-10-CM

## 2024-12-26 DIAGNOSIS — R79.89 ELEVATED TROPONIN: ICD-10-CM

## 2024-12-26 DIAGNOSIS — N28.9 RENAL INSUFFICIENCY: ICD-10-CM

## 2024-12-26 DIAGNOSIS — J96.01 ACUTE RESPIRATORY FAILURE WITH HYPOXIA (MULTI): ICD-10-CM

## 2024-12-26 DIAGNOSIS — I15.9 SECONDARY HYPERTENSION: ICD-10-CM

## 2024-12-26 DIAGNOSIS — F41.9 ANXIETY AND DEPRESSION: ICD-10-CM

## 2024-12-26 DIAGNOSIS — I50.33 ACUTE ON CHRONIC DIASTOLIC CONGESTIVE HEART FAILURE: ICD-10-CM

## 2024-12-26 DIAGNOSIS — I50.9 ACUTE CONGESTIVE HEART FAILURE, UNSPECIFIED HEART FAILURE TYPE: ICD-10-CM

## 2024-12-26 DIAGNOSIS — F32.A ANXIETY AND DEPRESSION: ICD-10-CM

## 2024-12-26 PROBLEM — I50.43 ACUTE ON CHRONIC COMBINED SYSTOLIC (CONGESTIVE) AND DIASTOLIC (CONGESTIVE) HEART FAILURE: Status: ACTIVE | Noted: 2024-12-26

## 2024-12-26 PROBLEM — Z91.148 NON COMPLIANCE W MEDICATION REGIMEN: Status: ACTIVE | Noted: 2024-12-26

## 2024-12-26 LAB
ALBUMIN SERPL BCP-MCNC: 3.3 G/DL (ref 3.4–5)
ALP SERPL-CCNC: 64 U/L (ref 33–120)
ALT SERPL W P-5'-P-CCNC: 13 U/L (ref 10–52)
ANION GAP SERPL CALC-SCNC: 11 MMOL/L (ref 10–20)
APTT PPP: 34 SECONDS (ref 27–38)
AST SERPL W P-5'-P-CCNC: 11 U/L (ref 9–39)
BASE EXCESS BLDV CALC-SCNC: -2.8 MMOL/L (ref -2–3)
BASOPHILS # BLD AUTO: 0.06 X10*3/UL (ref 0–0.1)
BASOPHILS NFR BLD AUTO: 0.5 %
BILIRUB SERPL-MCNC: 0.3 MG/DL (ref 0–1.2)
BNP SERPL-MCNC: 1879 PG/ML (ref 0–99)
BODY TEMPERATURE: ABNORMAL
BUN SERPL-MCNC: 27 MG/DL (ref 6–23)
CALCIUM SERPL-MCNC: 8.2 MG/DL (ref 8.6–10.3)
CARDIAC TROPONIN I PNL SERPL HS: 126 NG/L (ref 0–20)
CARDIAC TROPONIN I PNL SERPL HS: 130 NG/L (ref 0–20)
CARDIAC TROPONIN I PNL SERPL HS: 131 NG/L (ref 0–20)
CARDIAC TROPONIN I PNL SERPL HS: 131 NG/L (ref 0–20)
CHLORIDE SERPL-SCNC: 108 MMOL/L (ref 98–107)
CO2 SERPL-SCNC: 23 MMOL/L (ref 21–32)
CREAT SERPL-MCNC: 2.11 MG/DL (ref 0.5–1.3)
D DIMER PPP FEU-MCNC: 745 NG/ML FEU
EGFRCR SERPLBLD CKD-EPI 2021: 37 ML/MIN/1.73M*2
EOSINOPHIL # BLD AUTO: 0.18 X10*3/UL (ref 0–0.7)
EOSINOPHIL NFR BLD AUTO: 1.6 %
ERYTHROCYTE [DISTWIDTH] IN BLOOD BY AUTOMATED COUNT: 13.9 % (ref 11.5–14.5)
FLUAV RNA RESP QL NAA+PROBE: NOT DETECTED
FLUBV RNA RESP QL NAA+PROBE: NOT DETECTED
GLUCOSE SERPL-MCNC: 130 MG/DL (ref 74–99)
HCO3 BLDV-SCNC: 21.9 MMOL/L (ref 22–26)
HCT VFR BLD AUTO: 41.3 % (ref 41–52)
HGB BLD-MCNC: 13.7 G/DL (ref 13.5–17.5)
HOLD SPECIMEN: NORMAL
HOLD SPECIMEN: NORMAL
IMM GRANULOCYTES # BLD AUTO: 0.05 X10*3/UL (ref 0–0.7)
IMM GRANULOCYTES NFR BLD AUTO: 0.4 % (ref 0–0.9)
INHALED O2 CONCENTRATION: 21 %
INR PPP: 1.1 (ref 0.9–1.1)
LYMPHOCYTES # BLD AUTO: 1.38 X10*3/UL (ref 1.2–4.8)
LYMPHOCYTES NFR BLD AUTO: 12.4 %
MAGNESIUM SERPL-MCNC: 2.06 MG/DL (ref 1.6–2.4)
MCH RBC QN AUTO: 30.9 PG (ref 26–34)
MCHC RBC AUTO-ENTMCNC: 33.2 G/DL (ref 32–36)
MCV RBC AUTO: 93 FL (ref 80–100)
MONOCYTES # BLD AUTO: 0.98 X10*3/UL (ref 0.1–1)
MONOCYTES NFR BLD AUTO: 8.8 %
NEUTROPHILS # BLD AUTO: 8.47 X10*3/UL (ref 1.2–7.7)
NEUTROPHILS NFR BLD AUTO: 76.3 %
NRBC BLD-RTO: 0 /100 WBCS (ref 0–0)
OXYHGB MFR BLDV: 86.4 % (ref 45–75)
PCO2 BLDV: 37 MM HG (ref 41–51)
PH BLDV: 7.38 PH (ref 7.33–7.43)
PLATELET # BLD AUTO: 337 X10*3/UL (ref 150–450)
PO2 BLDV: 54 MM HG (ref 35–45)
POTASSIUM SERPL-SCNC: 3.4 MMOL/L (ref 3.5–5.3)
PROT SERPL-MCNC: 6.7 G/DL (ref 6.4–8.2)
PROTHROMBIN TIME: 12.1 SECONDS (ref 9.8–12.8)
RBC # BLD AUTO: 4.44 X10*6/UL (ref 4.5–5.9)
SAO2 % BLDV: 89 % (ref 45–75)
SARS-COV-2 RNA RESP QL NAA+PROBE: NOT DETECTED
SODIUM SERPL-SCNC: 139 MMOL/L (ref 136–145)
WBC # BLD AUTO: 11.1 X10*3/UL (ref 4.4–11.3)

## 2024-12-26 PROCEDURE — 94664 DEMO&/EVAL PT USE INHALER: CPT

## 2024-12-26 PROCEDURE — 87636 SARSCOV2 & INF A&B AMP PRB: CPT | Performed by: EMERGENCY MEDICINE

## 2024-12-26 PROCEDURE — 99291 CRITICAL CARE FIRST HOUR: CPT | Mod: 25 | Performed by: EMERGENCY MEDICINE

## 2024-12-26 PROCEDURE — 85025 COMPLETE CBC W/AUTO DIFF WBC: CPT | Performed by: EMERGENCY MEDICINE

## 2024-12-26 PROCEDURE — 99222 1ST HOSP IP/OBS MODERATE 55: CPT | Performed by: NURSE PRACTITIONER

## 2024-12-26 PROCEDURE — 82805 BLOOD GASES W/O2 SATURATION: CPT | Performed by: EMERGENCY MEDICINE

## 2024-12-26 PROCEDURE — 84484 ASSAY OF TROPONIN QUANT: CPT | Mod: IPSPLIT | Performed by: STUDENT IN AN ORGANIZED HEALTH CARE EDUCATION/TRAINING PROGRAM

## 2024-12-26 PROCEDURE — 85610 PROTHROMBIN TIME: CPT | Performed by: EMERGENCY MEDICINE

## 2024-12-26 PROCEDURE — 93005 ELECTROCARDIOGRAM TRACING: CPT

## 2024-12-26 PROCEDURE — 85730 THROMBOPLASTIN TIME PARTIAL: CPT | Performed by: EMERGENCY MEDICINE

## 2024-12-26 PROCEDURE — 85379 FIBRIN DEGRADATION QUANT: CPT | Performed by: EMERGENCY MEDICINE

## 2024-12-26 PROCEDURE — 2500000005 HC RX 250 GENERAL PHARMACY W/O HCPCS: Performed by: EMERGENCY MEDICINE

## 2024-12-26 PROCEDURE — 2500000005 HC RX 250 GENERAL PHARMACY W/O HCPCS: Mod: IPSPLIT | Performed by: NURSE PRACTITIONER

## 2024-12-26 PROCEDURE — 2500000001 HC RX 250 WO HCPCS SELF ADMINISTERED DRUGS (ALT 637 FOR MEDICARE OP): Mod: IPSPLIT | Performed by: NURSE PRACTITIONER

## 2024-12-26 PROCEDURE — 93005 ELECTROCARDIOGRAM TRACING: CPT | Mod: IPSPLIT

## 2024-12-26 PROCEDURE — 94760 N-INVAS EAR/PLS OXIMETRY 1: CPT | Mod: IPSPLIT

## 2024-12-26 PROCEDURE — 84484 ASSAY OF TROPONIN QUANT: CPT | Mod: 91 | Performed by: EMERGENCY MEDICINE

## 2024-12-26 PROCEDURE — 2500000002 HC RX 250 W HCPCS SELF ADMINISTERED DRUGS (ALT 637 FOR MEDICARE OP, ALT 636 FOR OP/ED): Mod: IPSPLIT | Performed by: NURSE PRACTITIONER

## 2024-12-26 PROCEDURE — 2500000002 HC RX 250 W HCPCS SELF ADMINISTERED DRUGS (ALT 637 FOR MEDICARE OP, ALT 636 FOR OP/ED): Performed by: EMERGENCY MEDICINE

## 2024-12-26 PROCEDURE — 1200000002 HC GENERAL ROOM WITH TELEMETRY DAILY: Mod: IPSPLIT

## 2024-12-26 PROCEDURE — 94640 AIRWAY INHALATION TREATMENT: CPT

## 2024-12-26 PROCEDURE — 2500000004 HC RX 250 GENERAL PHARMACY W/ HCPCS (ALT 636 FOR OP/ED): Performed by: EMERGENCY MEDICINE

## 2024-12-26 PROCEDURE — 36415 COLL VENOUS BLD VENIPUNCTURE: CPT | Performed by: EMERGENCY MEDICINE

## 2024-12-26 PROCEDURE — 71045 X-RAY EXAM CHEST 1 VIEW: CPT | Performed by: RADIOLOGY

## 2024-12-26 PROCEDURE — 9420000001 HC RT PATIENT EDUCATION 5 MIN

## 2024-12-26 PROCEDURE — 36415 COLL VENOUS BLD VENIPUNCTURE: CPT | Mod: IPSPLIT | Performed by: EMERGENCY MEDICINE

## 2024-12-26 PROCEDURE — 96374 THER/PROPH/DIAG INJ IV PUSH: CPT

## 2024-12-26 PROCEDURE — 96375 TX/PRO/DX INJ NEW DRUG ADDON: CPT

## 2024-12-26 PROCEDURE — 80053 COMPREHEN METABOLIC PANEL: CPT | Performed by: EMERGENCY MEDICINE

## 2024-12-26 PROCEDURE — 83880 ASSAY OF NATRIURETIC PEPTIDE: CPT | Performed by: EMERGENCY MEDICINE

## 2024-12-26 PROCEDURE — 2500000004 HC RX 250 GENERAL PHARMACY W/ HCPCS (ALT 636 FOR OP/ED)

## 2024-12-26 PROCEDURE — 83735 ASSAY OF MAGNESIUM: CPT | Performed by: EMERGENCY MEDICINE

## 2024-12-26 PROCEDURE — 71045 X-RAY EXAM CHEST 1 VIEW: CPT

## 2024-12-26 PROCEDURE — 2500000004 HC RX 250 GENERAL PHARMACY W/ HCPCS (ALT 636 FOR OP/ED): Mod: IPSPLIT | Performed by: NURSE PRACTITIONER

## 2024-12-26 PROCEDURE — 84484 ASSAY OF TROPONIN QUANT: CPT | Performed by: EMERGENCY MEDICINE

## 2024-12-26 RX ORDER — PANTOPRAZOLE SODIUM 40 MG/1
40 TABLET, DELAYED RELEASE ORAL
Status: DISCONTINUED | OUTPATIENT
Start: 2024-12-27 | End: 2024-12-27 | Stop reason: HOSPADM

## 2024-12-26 RX ORDER — DULOXETIN HYDROCHLORIDE 30 MG/1
30 CAPSULE, DELAYED RELEASE ORAL DAILY
Status: DISCONTINUED | OUTPATIENT
Start: 2024-12-26 | End: 2024-12-27 | Stop reason: HOSPADM

## 2024-12-26 RX ORDER — POTASSIUM CHLORIDE 20 MEQ/1
40 TABLET, EXTENDED RELEASE ORAL ONCE
Status: COMPLETED | OUTPATIENT
Start: 2024-12-26 | End: 2024-12-26

## 2024-12-26 RX ORDER — ONDANSETRON HYDROCHLORIDE 2 MG/ML
4 INJECTION, SOLUTION INTRAVENOUS EVERY 8 HOURS PRN
Status: DISCONTINUED | OUTPATIENT
Start: 2024-12-26 | End: 2024-12-27 | Stop reason: HOSPADM

## 2024-12-26 RX ORDER — METOPROLOL SUCCINATE 50 MG/1
50 TABLET, EXTENDED RELEASE ORAL DAILY
Status: DISCONTINUED | OUTPATIENT
Start: 2024-12-26 | End: 2024-12-27 | Stop reason: HOSPADM

## 2024-12-26 RX ORDER — IPRATROPIUM BROMIDE AND ALBUTEROL SULFATE 2.5; .5 MG/3ML; MG/3ML
3 SOLUTION RESPIRATORY (INHALATION)
Status: DISCONTINUED | OUTPATIENT
Start: 2024-12-26 | End: 2024-12-26

## 2024-12-26 RX ORDER — ENOXAPARIN SODIUM 100 MG/ML
40 INJECTION SUBCUTANEOUS EVERY 24 HOURS
Status: DISCONTINUED | OUTPATIENT
Start: 2024-12-26 | End: 2024-12-27 | Stop reason: HOSPADM

## 2024-12-26 RX ORDER — AMLODIPINE BESYLATE 5 MG/1
10 TABLET ORAL DAILY
Status: DISCONTINUED | OUTPATIENT
Start: 2024-12-26 | End: 2024-12-27 | Stop reason: HOSPADM

## 2024-12-26 RX ORDER — ACETAMINOPHEN 325 MG/1
650 TABLET ORAL EVERY 4 HOURS PRN
Status: DISCONTINUED | OUTPATIENT
Start: 2024-12-26 | End: 2024-12-27 | Stop reason: HOSPADM

## 2024-12-26 RX ORDER — METOPROLOL TARTRATE 1 MG/ML
INJECTION, SOLUTION INTRAVENOUS
Status: COMPLETED
Start: 2024-12-26 | End: 2024-12-26

## 2024-12-26 RX ORDER — LORAZEPAM 0.5 MG/1
0.5 TABLET ORAL EVERY 12 HOURS PRN
Status: DISCONTINUED | OUTPATIENT
Start: 2024-12-26 | End: 2024-12-27 | Stop reason: HOSPADM

## 2024-12-26 RX ORDER — POLYETHYLENE GLYCOL 3350 17 G/17G
17 POWDER, FOR SOLUTION ORAL DAILY
Status: DISCONTINUED | OUTPATIENT
Start: 2024-12-26 | End: 2024-12-27 | Stop reason: HOSPADM

## 2024-12-26 RX ORDER — LISINOPRIL 20 MG/1
40 TABLET ORAL DAILY
Status: DISCONTINUED | OUTPATIENT
Start: 2024-12-26 | End: 2024-12-27 | Stop reason: HOSPADM

## 2024-12-26 RX ORDER — FUROSEMIDE 40 MG/1
40 TABLET ORAL
Status: DISCONTINUED | OUTPATIENT
Start: 2024-12-26 | End: 2024-12-27 | Stop reason: HOSPADM

## 2024-12-26 RX ORDER — METOPROLOL TARTRATE 1 MG/ML
5 INJECTION, SOLUTION INTRAVENOUS ONCE
Status: COMPLETED | OUTPATIENT
Start: 2024-12-26 | End: 2024-12-26

## 2024-12-26 RX ORDER — FUROSEMIDE 10 MG/ML
40 INJECTION INTRAMUSCULAR; INTRAVENOUS ONCE
Status: COMPLETED | OUTPATIENT
Start: 2024-12-26 | End: 2024-12-26

## 2024-12-26 RX ADMIN — Medication 2 L/MIN: at 12:15

## 2024-12-26 RX ADMIN — FUROSEMIDE 5 MG/HR: 10 INJECTION, SOLUTION INTRAMUSCULAR; INTRAVENOUS at 16:17

## 2024-12-26 RX ADMIN — AMLODIPINE BESYLATE 10 MG: 5 TABLET ORAL at 15:22

## 2024-12-26 RX ADMIN — IPRATROPIUM BROMIDE AND ALBUTEROL SULFATE 3 ML: .5; 3 SOLUTION RESPIRATORY (INHALATION) at 12:34

## 2024-12-26 RX ADMIN — POTASSIUM CHLORIDE 40 MEQ: 1500 TABLET, EXTENDED RELEASE ORAL at 17:06

## 2024-12-26 RX ADMIN — METOPROLOL TARTRATE 5 MG: 5 INJECTION INTRAVENOUS at 12:46

## 2024-12-26 RX ADMIN — LISINOPRIL 40 MG: 20 TABLET ORAL at 15:22

## 2024-12-26 RX ADMIN — Medication 2 L/MIN: at 20:19

## 2024-12-26 RX ADMIN — METOPROLOL TARTRATE 5 MG: 1 INJECTION, SOLUTION INTRAVENOUS at 12:46

## 2024-12-26 RX ADMIN — METOPROLOL SUCCINATE 50 MG: 50 TABLET, EXTENDED RELEASE ORAL at 16:00

## 2024-12-26 RX ADMIN — DULOXETINE HYDROCHLORIDE 30 MG: 30 CAPSULE, DELAYED RELEASE ORAL at 21:27

## 2024-12-26 RX ADMIN — FUROSEMIDE 40 MG: 10 INJECTION, SOLUTION INTRAMUSCULAR; INTRAVENOUS at 13:19

## 2024-12-26 RX ADMIN — ENOXAPARIN SODIUM 40 MG: 40 INJECTION SUBCUTANEOUS at 16:01

## 2024-12-26 SDOH — SOCIAL STABILITY: SOCIAL INSECURITY: DO YOU FEEL ANYONE HAS EXPLOITED OR TAKEN ADVANTAGE OF YOU FINANCIALLY OR OF YOUR PERSONAL PROPERTY?: NO

## 2024-12-26 SDOH — SOCIAL STABILITY: SOCIAL INSECURITY: WERE YOU ABLE TO COMPLETE ALL THE BEHAVIORAL HEALTH SCREENINGS?: YES

## 2024-12-26 SDOH — ECONOMIC STABILITY: FOOD INSECURITY: WITHIN THE PAST 12 MONTHS, THE FOOD YOU BOUGHT JUST DIDN'T LAST AND YOU DIDN'T HAVE MONEY TO GET MORE.: NEVER TRUE

## 2024-12-26 SDOH — SOCIAL STABILITY: SOCIAL INSECURITY: DO YOU FEEL UNSAFE GOING BACK TO THE PLACE WHERE YOU ARE LIVING?: NO

## 2024-12-26 SDOH — SOCIAL STABILITY: SOCIAL INSECURITY: WITHIN THE LAST YEAR, HAVE YOU BEEN HUMILIATED OR EMOTIONALLY ABUSED IN OTHER WAYS BY YOUR PARTNER OR EX-PARTNER?: NO

## 2024-12-26 SDOH — SOCIAL STABILITY: SOCIAL INSECURITY: ARE THERE ANY APPARENT SIGNS OF INJURIES/BEHAVIORS THAT COULD BE RELATED TO ABUSE/NEGLECT?: NO

## 2024-12-26 SDOH — ECONOMIC STABILITY: FOOD INSECURITY: WITHIN THE PAST 12 MONTHS, YOU WORRIED THAT YOUR FOOD WOULD RUN OUT BEFORE YOU GOT THE MONEY TO BUY MORE.: NEVER TRUE

## 2024-12-26 SDOH — SOCIAL STABILITY: SOCIAL INSECURITY: HAVE YOU HAD THOUGHTS OF HARMING ANYONE ELSE?: NO

## 2024-12-26 SDOH — ECONOMIC STABILITY: TRANSPORTATION INSECURITY: IN THE PAST 12 MONTHS, HAS LACK OF TRANSPORTATION KEPT YOU FROM MEDICAL APPOINTMENTS OR FROM GETTING MEDICATIONS?: NO

## 2024-12-26 SDOH — SOCIAL STABILITY: SOCIAL INSECURITY
WITHIN THE LAST YEAR, HAVE YOU BEEN RAPED OR FORCED TO HAVE ANY KIND OF SEXUAL ACTIVITY BY YOUR PARTNER OR EX-PARTNER?: NO

## 2024-12-26 SDOH — SOCIAL STABILITY: SOCIAL INSECURITY: HAS ANYONE EVER THREATENED TO HURT YOUR FAMILY OR YOUR PETS?: NO

## 2024-12-26 SDOH — ECONOMIC STABILITY: HOUSING INSECURITY: IN THE LAST 12 MONTHS, WAS THERE A TIME WHEN YOU WERE NOT ABLE TO PAY THE MORTGAGE OR RENT ON TIME?: NO

## 2024-12-26 SDOH — SOCIAL STABILITY: SOCIAL INSECURITY: ARE YOU OR HAVE YOU BEEN THREATENED OR ABUSED PHYSICALLY, EMOTIONALLY, OR SEXUALLY BY ANYONE?: NO

## 2024-12-26 SDOH — ECONOMIC STABILITY: FOOD INSECURITY: HOW HARD IS IT FOR YOU TO PAY FOR THE VERY BASICS LIKE FOOD, HOUSING, MEDICAL CARE, AND HEATING?: NOT HARD AT ALL

## 2024-12-26 SDOH — ECONOMIC STABILITY: HOUSING INSECURITY: AT ANY TIME IN THE PAST 12 MONTHS, WERE YOU HOMELESS OR LIVING IN A SHELTER (INCLUDING NOW)?: NO

## 2024-12-26 SDOH — SOCIAL STABILITY: SOCIAL INSECURITY
WITHIN THE LAST YEAR, HAVE YOU BEEN KICKED, HIT, SLAPPED, OR OTHERWISE PHYSICALLY HURT BY YOUR PARTNER OR EX-PARTNER?: NO

## 2024-12-26 SDOH — SOCIAL STABILITY: SOCIAL INSECURITY: WITHIN THE LAST YEAR, HAVE YOU BEEN AFRAID OF YOUR PARTNER OR EX-PARTNER?: NO

## 2024-12-26 SDOH — ECONOMIC STABILITY: INCOME INSECURITY: IN THE PAST 12 MONTHS HAS THE ELECTRIC, GAS, OIL, OR WATER COMPANY THREATENED TO SHUT OFF SERVICES IN YOUR HOME?: NO

## 2024-12-26 SDOH — SOCIAL STABILITY: SOCIAL INSECURITY: DOES ANYONE TRY TO KEEP YOU FROM HAVING/CONTACTING OTHER FRIENDS OR DOING THINGS OUTSIDE YOUR HOME?: NO

## 2024-12-26 SDOH — ECONOMIC STABILITY: HOUSING INSECURITY: IN THE PAST 12 MONTHS, HOW MANY TIMES HAVE YOU MOVED WHERE YOU WERE LIVING?: 1

## 2024-12-26 SDOH — SOCIAL STABILITY: SOCIAL INSECURITY: ABUSE: ADULT

## 2024-12-26 ASSESSMENT — COGNITIVE AND FUNCTIONAL STATUS - GENERAL
PATIENT BASELINE BEDBOUND: NO
DAILY ACTIVITIY SCORE: 24
MOBILITY SCORE: 23
CLIMB 3 TO 5 STEPS WITH RAILING: A LITTLE

## 2024-12-26 ASSESSMENT — ENCOUNTER SYMPTOMS
SHORTNESS OF BREATH: 1
FATIGUE: 1
EYES NEGATIVE: 1
ACTIVITY CHANGE: 1
APPETITE CHANGE: 1
UNEXPECTED WEIGHT CHANGE: 1
PSYCHIATRIC NEGATIVE: 1
MUSCULOSKELETAL NEGATIVE: 1
FEVER: 1
DIAPHORESIS: 1
CHILLS: 1
NEUROLOGICAL NEGATIVE: 1
GASTROINTESTINAL NEGATIVE: 1

## 2024-12-26 ASSESSMENT — ACTIVITIES OF DAILY LIVING (ADL)
HEARING - RIGHT EAR: FUNCTIONAL
PATIENT'S MEMORY ADEQUATE TO SAFELY COMPLETE DAILY ACTIVITIES?: YES
LACK_OF_TRANSPORTATION: NO
WALKS IN HOME: INDEPENDENT
BATHING: INDEPENDENT
FEEDING YOURSELF: INDEPENDENT
HEARING - LEFT EAR: FUNCTIONAL
TOILETING: INDEPENDENT
JUDGMENT_ADEQUATE_SAFELY_COMPLETE_DAILY_ACTIVITIES: YES
DRESSING YOURSELF: INDEPENDENT
GROOMING: INDEPENDENT
ADEQUATE_TO_COMPLETE_ADL: YES
LACK_OF_TRANSPORTATION: NO

## 2024-12-26 ASSESSMENT — PAIN SCALES - GENERAL
PAINLEVEL_OUTOF10: 0 - NO PAIN

## 2024-12-26 ASSESSMENT — PATIENT HEALTH QUESTIONNAIRE - PHQ9
SUM OF ALL RESPONSES TO PHQ9 QUESTIONS 1 & 2: 0
1. LITTLE INTEREST OR PLEASURE IN DOING THINGS: NOT AT ALL
2. FEELING DOWN, DEPRESSED OR HOPELESS: NOT AT ALL

## 2024-12-26 ASSESSMENT — LIFESTYLE VARIABLES
AUDIT-C TOTAL SCORE: 0
AUDIT-C TOTAL SCORE: 0
HOW OFTEN DO YOU HAVE A DRINK CONTAINING ALCOHOL: NEVER
HOW MANY STANDARD DRINKS CONTAINING ALCOHOL DO YOU HAVE ON A TYPICAL DAY: PATIENT DOES NOT DRINK
HOW OFTEN DO YOU HAVE 6 OR MORE DRINKS ON ONE OCCASION: NEVER
SKIP TO QUESTIONS 9-10: 1

## 2024-12-26 ASSESSMENT — PAIN - FUNCTIONAL ASSESSMENT
PAIN_FUNCTIONAL_ASSESSMENT: 0-10
PAIN_FUNCTIONAL_ASSESSMENT: 0-10

## 2024-12-26 NOTE — ED TRIAGE NOTES
Pt here ambulatory to ED from doctor's appointment across the street for complaint of shortness of breath. States this has been going on for about 6 years on and off. Patient is 92-88 SPO2 on RA upon arrival, placed on 2L NC. Patient denies any chest pain. States he does have swollen lymph nodes that have been swollen and growing over the last 7 years as well. States he does have a cardiac history but is unsure exactly what conditions he has. No other symptoms at this time, pt talking in full sentences answer questions.

## 2024-12-26 NOTE — H&P
History Of Present Illness  Iglesia Clarke is a 54 y.o. male presenting with  a complaint of SOB. He is from home and he went to see his Pcp today. He has a hx of heart disease and systolic heart failure. He is suppose to be taking furosemide; but stopped taking them. He reports he was feeling bad and lethargic. He thought it was the medications he was on; so he started stopping the medication.    In the ED:  Labs: glu 130; Na 139; K 3.4; Bun 27; Cr 2.11; BNP 1,879, Troponin 126 > 131 > 130; WBC 11.1; Hb 13.7; Hcrt 41.3; ; VBG 7.38/37/54/21.9  Radiology: CXR Findings suggestive of pulmonary edema.   Medications: furosemide, potassium  Disposition: admitted to med/surg telemetry     Past Medical History  Past Medical History:   Diagnosis Date    Kidney stones        Surgical History  History reviewed. No pertinent surgical history.     Social History  He reports that he has never smoked. He has never used smokeless tobacco. He reports that he does not currently use alcohol. He reports that he does not use drugs.    Family History  No family history on file.     Allergies  Patient has no known allergies.    Review of Systems   Constitutional:  Positive for activity change, appetite change, chills, diaphoresis, fatigue, fever and unexpected weight change.   HENT: Negative.     Eyes: Negative.    Respiratory:  Positive for shortness of breath.    Cardiovascular:  Positive for leg swelling.   Gastrointestinal: Negative.    Genitourinary: Negative.    Musculoskeletal: Negative.    Skin: Negative.    Neurological: Negative.    Psychiatric/Behavioral: Negative.          Physical Exam  Vitals reviewed.   Constitutional:       Appearance: Normal appearance. He is obese.   HENT:      Head: Normocephalic and atraumatic.      Right Ear: External ear normal.      Left Ear: External ear normal.      Nose: Nose normal.      Mouth/Throat:      Mouth: Mucous membranes are moist.      Pharynx: Oropharynx is clear.   Eyes:       "Conjunctiva/sclera: Conjunctivae normal.      Pupils: Pupils are equal, round, and reactive to light.   Cardiovascular:      Rate and Rhythm: Normal rate and regular rhythm.      Pulses: Normal pulses.      Heart sounds: Murmur heard.   Pulmonary:      Breath sounds: Rales present.      Comments: Conversational dyspnea  Abdominal:      General: Bowel sounds are normal.      Palpations: Abdomen is soft.   Musculoskeletal:         General: Swelling present. Normal range of motion.      Cervical back: Normal range of motion and neck supple.      Right lower leg: Edema present.      Left lower leg: Edema present.   Skin:     General: Skin is warm and dry.   Neurological:      General: No focal deficit present.      Mental Status: He is alert and oriented to person, place, and time.   Psychiatric:         Mood and Affect: Mood normal.         Behavior: Behavior normal.          Last Recorded Vitals  Blood pressure (!) 193/126, pulse 85, temperature 36.7 °C (98.1 °F), temperature source Temporal, resp. rate 22, height 1.829 m (6' 0.01\"), weight 130 kg (286 lb), SpO2 94%.    Relevant Results    Scheduled medications  amLODIPine, 10 mg, oral, Daily  enoxaparin, 40 mg, subcutaneous, q24h  [Held by provider] furosemide, 40 mg, oral, BID  lisinopril, 40 mg, oral, Daily  metoprolol succinate XL, 50 mg, oral, Daily  oxygen, , inhalation, Continuous - Inhalation  oxygen, , inhalation, Continuous - Inhalation  [START ON 12/27/2024] pantoprazole, 40 mg, oral, Daily before breakfast  polyethylene glycol, 17 g, oral, Daily      Continuous medications  furosemide, 5 mg/hr, Last Rate: 5 mg/hr (12/26/24 1617)      PRN medications  PRN medications: acetaminophen, ondansetron      Results for orders placed or performed during the hospital encounter of 12/26/24 (from the past 24 hours)   ECG 12 lead   Result Value Ref Range    Ventricular Rate 107 BPM    Atrial Rate 107 BPM    MO Interval 168 ms    QRS Duration 136 ms    QT Interval 466 ms "    QTC Calculation(Bazett) 622 ms    R Axis 30 degrees    T Axis -47 degrees    QRS Count 17 beats    Q Onset 191 ms    T Offset 424 ms    QTC Fredericia 565 ms   CBC with Differential   Result Value Ref Range    WBC 11.1 4.4 - 11.3 x10*3/uL    nRBC 0.0 0.0 - 0.0 /100 WBCs    RBC 4.44 (L) 4.50 - 5.90 x10*6/uL    Hemoglobin 13.7 13.5 - 17.5 g/dL    Hematocrit 41.3 41.0 - 52.0 %    MCV 93 80 - 100 fL    MCH 30.9 26.0 - 34.0 pg    MCHC 33.2 32.0 - 36.0 g/dL    RDW 13.9 11.5 - 14.5 %    Platelets 337 150 - 450 x10*3/uL    Neutrophils % 76.3 40.0 - 80.0 %    Immature Granulocytes %, Automated 0.4 0.0 - 0.9 %    Lymphocytes % 12.4 13.0 - 44.0 %    Monocytes % 8.8 2.0 - 10.0 %    Eosinophils % 1.6 0.0 - 6.0 %    Basophils % 0.5 0.0 - 2.0 %    Neutrophils Absolute 8.47 (H) 1.20 - 7.70 x10*3/uL    Immature Granulocytes Absolute, Automated 0.05 0.00 - 0.70 x10*3/uL    Lymphocytes Absolute 1.38 1.20 - 4.80 x10*3/uL    Monocytes Absolute 0.98 0.10 - 1.00 x10*3/uL    Eosinophils Absolute 0.18 0.00 - 0.70 x10*3/uL    Basophils Absolute 0.06 0.00 - 0.10 x10*3/uL   Comprehensive Metabolic Panel   Result Value Ref Range    Glucose 130 (H) 74 - 99 mg/dL    Sodium 139 136 - 145 mmol/L    Potassium 3.4 (L) 3.5 - 5.3 mmol/L    Chloride 108 (H) 98 - 107 mmol/L    Bicarbonate 23 21 - 32 mmol/L    Anion Gap 11 10 - 20 mmol/L    Urea Nitrogen 27 (H) 6 - 23 mg/dL    Creatinine 2.11 (H) 0.50 - 1.30 mg/dL    eGFR 37 (L) >60 mL/min/1.73m*2    Calcium 8.2 (L) 8.6 - 10.3 mg/dL    Albumin 3.3 (L) 3.4 - 5.0 g/dL    Alkaline Phosphatase 64 33 - 120 U/L    Total Protein 6.7 6.4 - 8.2 g/dL    AST 11 9 - 39 U/L    Bilirubin, Total 0.3 0.0 - 1.2 mg/dL    ALT 13 10 - 52 U/L   Troponin I, High Sensitivity, Initial   Result Value Ref Range    Troponin I, High Sensitivity 126 (HH) 0 - 20 ng/L   Red Top   Result Value Ref Range    Extra Tube Hold for add-ons.    Gray Top   Result Value Ref Range    Extra Tube Hold for add-ons.    Magnesium   Result Value Ref  Range    Magnesium 2.06 1.60 - 2.40 mg/dL   Troponin, High Sensitivity, 1 Hour   Result Value Ref Range    Troponin I, High Sensitivity 131 (HH) 0 - 20 ng/L   D-Dimer, VTE Exclusion   Result Value Ref Range    D-Dimer, Quantitative VTE Exclusion 745 (H) <=500 ng/mL FEU   Protime-INR   Result Value Ref Range    Protime 12.1 9.8 - 12.8 seconds    INR 1.1 0.9 - 1.1   aPTT   Result Value Ref Range    aPTT 34 27 - 38 seconds   Blood Gas Venous   Result Value Ref Range    POCT pH, Venous 7.38 7.33 - 7.43 pH    POCT pCO2, Venous 37 (L) 41 - 51 mm Hg    POCT pO2, Venous 54 (H) 35 - 45 mm Hg    POCT SO2, Venous 89 (H) 45 - 75 %    POCT Oxy Hemoglobin, Venous 86.4 (H) 45.0 - 75.0 %    POCT Base Excess, Venous -2.8 (L) -2.0 - 3.0 mmol/L    POCT HCO3 Calculated, Venous 21.9 (L) 22.0 - 26.0 mmol/L    Patient Temperature      FiO2 21 %   B-Type Natriuretic Peptide   Result Value Ref Range    BNP 1,879 (H) 0 - 99 pg/mL   Influenza A, and B PCR   Result Value Ref Range    Flu A Result Not Detected Not Detected    Flu B Result Not Detected Not Detected   Sars-CoV-2 PCR   Result Value Ref Range    Coronavirus 2019, PCR Not Detected Not Detected   ECG 12 lead   Result Value Ref Range    Ventricular Rate 88 BPM    Atrial Rate 88 BPM    HI Interval 184 ms    QRS Duration 140 ms    QT Interval 476 ms    QTC Calculation(Bazett) 575 ms    P Axis 43 degrees    R Axis 19 degrees    T Axis -71 degrees    QRS Count 14 beats    Q Onset 189 ms    P Onset 97 ms    P Offset 162 ms    T Offset 427 ms    QTC Fredericia 541 ms   Troponin, High Sensitivity, 1 Hour   Result Value Ref Range    Troponin I, High Sensitivity 130 (HH) 0 - 20 ng/L             Assessment/Plan   Assessment & Plan  Non compliance w medication regimen    Benign essential HTN    Anxiety and depression    Acute respiratory failure with hypoxia (Multi)    Elevated troponin level not due myocardial infarction    Acute on chronic combined systolic (congestive) and diastolic  (congestive) heart failure      Acute on Chronic Systolic and Diastolic  heart failure  Acute respiratory failure with hypoxia  Non compliance with medication regimen  Non MI troponin elevation  - SpO2 88% on RA  - supplemental oxygen to keep SpO2 > 90%  - currently on 2lpm via NC  - No oxygen at home  - Last echocardiogram 7/21/24: moderately decreased LVSF with an EF of 35-40% with abnormal pattern of LVDF  - stopped taking furosemide at home  - started furosemide drip 5 mg /hr  - continue amlodipine 10 mg daily, metoprolol succinate 50 mg daily  - hold lisinopril due to renal function  - cardiac monitoring via telemetry  - cardiology consult  - daily weight  - I&O  - 2 g sodium diet with 1500 mL fluid restriction    Anxiety and depression  - continue duloxetine 30 mg daily  - started lorazepam 0.5 mg q12h prn    PUD ppx  - started pantoprazole 40 mg daily    DVT ppx  - started enoxaparin 40 mg daily    Code status: DNRCC-Arrest/DNI confirmed    Disposition: Patient requires more then 2 inpatient days.    PAMELA Ace-CNP    Attending attestation:    Please see my addendum on discharge summary as I saw the patient today (12/27/2024).

## 2024-12-26 NOTE — NURSING NOTE
Patient arrived to floor via w/c from ED on 2L o2. Spo2 stating at 93-95%. Patient can hardlly speak without losing breath. Patient oriented to room and call button. He believes his friend has card with PIN number and is aloud to have information on .

## 2024-12-26 NOTE — ED PROVIDER NOTES
HPI   Chief Complaint   Patient presents with    Shortness of Breath       Patient presents from his doctor's office this morning after going to the office feeling short of breath.  The patient does have a history of an NSTEMI in the past.  Patient was admitted to [facility] several months ago and placed on several medications.  Patient does not use an inhaler or nebulizer at home.  He states he does not smoke.  He has been short of breath for quite some time but got worse over the past several days.  The patient always has intermittent chest pains.    ROS:    CONSTITUTIONAL: Denies weight loss, fever and chills    HEENT: Denies changes in vision and hearing, No sore throat    RESPIRATORY: Shortness of breath    CV: Intermittent chest pains    GI: Denies abdominal pain, nausea, vomiting and diarrhea    : Denies dysuria and urinary frequency    MUSCULOSKELETAL: Denies myalgia and joint pain    SKIN: Denies rash and pruritus    NEUROLOGICAL: Denies headache and syncope    PSYCHIATRIC: Denies recent changes in mood. Denies anxiety and depression      PHYSICAL EXAM:    GENERAL: Alert and oriented x 3. No acute distress. Well-nourished    EYES: EOMI. Anicteric    HEENT: Moist mucous membranes. No scleral icterus. No cervical lymphadenopathy    LUNGS: Tachypneic, decreased breath sounds, accessory muscle use    CARDIOVASCULAR: Regular rate and rhythm. No murmur. No JVD    ABDOMEN: Soft, non-tender and non-distended. No palpable masses    EXTREMITIES: No edema. Non-tender    SKIN: No rashes or lesions    NEUROLOGIC: No focal neurological deficits. CN II-XII grossly intact    PSYCHIATRIC: Cooperative. Appropriate mood and affect      EKG #1 shows sinus tachycardia at 107 bpm with a right bundle branch block.  There is significant anterior lateral ischemia.  Interpreted by me.  EKG #2 performed at 1322, interpreted by me, shows normal sinus rhythm at 88 bpm      Medical Decision Making:    Differential Diagnosis: CHF  exacerbation, cardiac event, electrolyte disorder, pneumonia    Clinical Laboratory Review: Reviewed all labs revealing elevated troponins and elevated BNP.  Patient with renal insufficiency as well    Imaging Review: Chest x-ray consistent with last chest x-ray, no overt pneumonia    Discussion with Consulting Physician: Discussed case with ADAM Hinkle who has agreed to accept this patient at [facility]    Treatment Plan: Admit    Follow Up:  Follow up with your primary care physician as soon as possible    Return Precautions:  Return to the emergency department if symptoms worsen at any time                  Patient History   Past Medical History:   Diagnosis Date    Kidney stones      History reviewed. No pertinent surgical history.  No family history on file.  Social History     Tobacco Use    Smoking status: Never    Smokeless tobacco: Never   Vaping Use    Vaping status: Never Used   Substance Use Topics    Alcohol use: Not Currently    Drug use: Never       Physical Exam   ED Triage Vitals   Temperature Heart Rate Respirations BP   12/26/24 1210 12/26/24 1210 12/26/24 1210 12/26/24 1210   36.4 °C (97.6 °F) (!) 107 20 (!) 169/111      SpO2 Temp src Heart Rate Source Patient Position   12/26/24 1209 -- -- --   91 %         BP Location FiO2 (%)     -- --             Physical Exam      ED Course & MDM   Diagnoses as of 12/26/24 1730   Acute on chronic systolic heart failure   Acute congestive heart failure, unspecified heart failure type   Secondary hypertension   Elevated troponin   Renal insufficiency   Hypoxia                 No data recorded     Chrystal Coma Scale Score: 15 (12/26/24 1212 : Adriane Wallace RN)                           Medical Decision Making      Procedure  Critical Care    Performed by: Tejal Shipley DO  Authorized by: Tejal Shipley DO    Critical care provider statement:     Critical care time (minutes):  35    Critical care start time:  12/26/2024 1:29 PM    Critical care end time:   12/26/2024 2:04 PM    Critical care was necessary to treat or prevent imminent or life-threatening deterioration of the following conditions:  Cardiac failure    Critical care was time spent personally by me on the following activities:  Ordering and performing treatments and interventions, ordering and review of laboratory studies, ordering and review of radiographic studies and pulse oximetry    Care discussed with: admitting provider        Diagnoses as of 12/26/24 1730   Acute on chronic systolic heart failure   Acute congestive heart failure, unspecified heart failure type   Secondary hypertension   Elevated troponin   Renal insufficiency   Hypoxia          Tejal Shipley, DO  12/26/24 1430      Diagnoses as of 12/26/24 1730   Acute on chronic systolic heart failure   Acute congestive heart failure, unspecified heart failure type   Secondary hypertension   Elevated troponin   Renal insufficiency   Michelle Shipley, DO  12/26/24 1730

## 2024-12-26 NOTE — CARE PLAN
"The patient's goals for the shift include      The clinical goals for the shift include Patient spo2 will remain above 92% on 2L through 1900    Patient remains independent in room. Reported to this nurse not to turn on those \"dam alarms.\" Patient also on strict I&O's, NP ordered meza and patient is refusing at this time. Kadi Hinkle NP reports meza does not need places as long as I&o's are done correctly. Remains on 2L o2. Denies pain. Resting in bed call button in reach.     "

## 2024-12-27 VITALS
RESPIRATION RATE: 20 BRPM | SYSTOLIC BLOOD PRESSURE: 139 MMHG | BODY MASS INDEX: 38.26 KG/M2 | WEIGHT: 282.5 LBS | OXYGEN SATURATION: 92 % | TEMPERATURE: 97.3 F | DIASTOLIC BLOOD PRESSURE: 84 MMHG | HEIGHT: 72 IN | HEART RATE: 81 BPM

## 2024-12-27 LAB
ANION GAP SERPL CALC-SCNC: 12 MMOL/L (ref 10–20)
ATRIAL RATE: 76 BPM
BNP SERPL-MCNC: 887 PG/ML (ref 0–99)
BUN SERPL-MCNC: 30 MG/DL (ref 6–23)
CALCIUM SERPL-MCNC: 8.1 MG/DL (ref 8.6–10.3)
CHLORIDE SERPL-SCNC: 108 MMOL/L (ref 98–107)
CO2 SERPL-SCNC: 25 MMOL/L (ref 21–32)
CREAT SERPL-MCNC: 2.38 MG/DL (ref 0.5–1.3)
EGFRCR SERPLBLD CKD-EPI 2021: 32 ML/MIN/1.73M*2
ERYTHROCYTE [DISTWIDTH] IN BLOOD BY AUTOMATED COUNT: 14.1 % (ref 11.5–14.5)
GLUCOSE SERPL-MCNC: 108 MG/DL (ref 74–99)
HCT VFR BLD AUTO: 39.9 % (ref 41–52)
HGB BLD-MCNC: 12.8 G/DL (ref 13.5–17.5)
MAGNESIUM SERPL-MCNC: 1.96 MG/DL (ref 1.6–2.4)
MCH RBC QN AUTO: 30.3 PG (ref 26–34)
MCHC RBC AUTO-ENTMCNC: 32.1 G/DL (ref 32–36)
MCV RBC AUTO: 94 FL (ref 80–100)
NRBC BLD-RTO: 0 /100 WBCS (ref 0–0)
P AXIS: 34 DEGREES
P OFFSET: 165 MS
P ONSET: 95 MS
PLATELET # BLD AUTO: 344 X10*3/UL (ref 150–450)
POTASSIUM SERPL-SCNC: 3.7 MMOL/L (ref 3.5–5.3)
PR INTERVAL: 186 MS
Q ONSET: 188 MS
QRS COUNT: 13 BEATS
QRS DURATION: 140 MS
QT INTERVAL: 460 MS
QTC CALCULATION(BAZETT): 517 MS
QTC FREDERICIA: 497 MS
R AXIS: -23 DEGREES
RBC # BLD AUTO: 4.23 X10*6/UL (ref 4.5–5.9)
SODIUM SERPL-SCNC: 141 MMOL/L (ref 136–145)
T AXIS: 255 DEGREES
T OFFSET: 418 MS
VENTRICULAR RATE: 76 BPM
WBC # BLD AUTO: 10.2 X10*3/UL (ref 4.4–11.3)

## 2024-12-27 PROCEDURE — 85027 COMPLETE CBC AUTOMATED: CPT | Mod: IPSPLIT | Performed by: NURSE PRACTITIONER

## 2024-12-27 PROCEDURE — 36415 COLL VENOUS BLD VENIPUNCTURE: CPT | Mod: IPSPLIT | Performed by: NURSE PRACTITIONER

## 2024-12-27 PROCEDURE — 83880 ASSAY OF NATRIURETIC PEPTIDE: CPT | Mod: IPSPLIT | Performed by: NURSE PRACTITIONER

## 2024-12-27 PROCEDURE — 83735 ASSAY OF MAGNESIUM: CPT | Mod: IPSPLIT | Performed by: NURSE PRACTITIONER

## 2024-12-27 PROCEDURE — 2500000001 HC RX 250 WO HCPCS SELF ADMINISTERED DRUGS (ALT 637 FOR MEDICARE OP): Mod: IPSPLIT | Performed by: NURSE PRACTITIONER

## 2024-12-27 PROCEDURE — 80048 BASIC METABOLIC PNL TOTAL CA: CPT | Mod: IPSPLIT | Performed by: NURSE PRACTITIONER

## 2024-12-27 RX ORDER — TORSEMIDE 20 MG/1
20 TABLET ORAL DAILY
Qty: 30 TABLET | Refills: 3 | Status: SHIPPED | OUTPATIENT
Start: 2024-12-27

## 2024-12-27 RX ORDER — DULOXETIN HYDROCHLORIDE 30 MG/1
30 CAPSULE, DELAYED RELEASE ORAL DAILY
Qty: 30 CAPSULE | Refills: 3 | Status: SHIPPED | OUTPATIENT
Start: 2024-12-28

## 2024-12-27 RX ADMIN — DULOXETINE HYDROCHLORIDE 30 MG: 30 CAPSULE, DELAYED RELEASE ORAL at 08:23

## 2024-12-27 RX ADMIN — PANTOPRAZOLE SODIUM 40 MG: 40 TABLET, DELAYED RELEASE ORAL at 06:06

## 2024-12-27 RX ADMIN — METOPROLOL SUCCINATE 50 MG: 50 TABLET, EXTENDED RELEASE ORAL at 08:23

## 2024-12-27 RX ADMIN — AMLODIPINE BESYLATE 10 MG: 5 TABLET ORAL at 08:23

## 2024-12-27 NOTE — NURSING NOTE
Patient requesting to leave. Friend present in room. Patient educated on being at risk for worsening symptoms and at risk for death. Betty Shelton Np notified.

## 2024-12-27 NOTE — DISCHARGE SUMMARY
Discharge Diagnosis  Acute on chronic heart failure 2/2 medication non-compliance.    Issues Requiring Follow-Up  Follow up with PCP. Patient to call for appointment.   Would benefit from cardiology involvement in his care.     Discharge Meds     Medication List      START taking these medications     DULoxetine 30 mg DR capsule; Commonly known as: Cymbalta; Take 1 capsule   (30 mg) by mouth once daily. Do not crush or chew.; Start taking on:   December 28, 2024   torsemide 20 mg tablet; Commonly known as: Demadex; Take 1 tablet (20   mg) by mouth once daily.     CHANGE how you take these medications     metoprolol succinate XL 50 mg 24 hr tablet; Commonly known as: Toprol   XL; Take 1 tablet (50 mg) by mouth once daily. Do not crush or chew.; What   changed: Another medication with the same name was removed. Continue   taking this medication, and follow the directions you see here.     CONTINUE taking these medications     acetaminophen 500 mg tablet; Commonly known as: Tylenol   amLODIPine 10 mg tablet; Commonly known as: Norvasc; Take 1 tablet (10   mg) by mouth once daily.   LORazepam 0.5 mg tablet; Commonly known as: Ativan     STOP taking these medications     acetaminophen-codeine 300-30 mg tablet; Commonly known as: Tylenol w/   Codeine #3   furosemide 40 mg tablet; Commonly known as: Lasix   gabapentin 100 mg capsule; Commonly known as: Neurontin   lisinopril 10 mg tablet   lisinopril 40 mg tablet       Test Results Pending At Discharge  Pending Labs       Order Current Status    Light Blue Top Collected (12/26/24 1204)    Extra Tubes In process            Hospital Course   Iglesia Clarke is a 54 y.o. male presenting with  a complaint of SOB. He is from home and he went to see his Pcp today. He has a hx of heart disease and systolic heart failure. He is suppose to be taking furosemide; but stopped taking them. He reports he was feeling bad and lethargic. He thought it was the medications he was on; so he  started stopping the medication.   Patient placed on furosemide drip. He is noted on oxygen with a baseline of room air with conversational dyspnea. He expressed that he has plenty of oxygen in his yard outside. Patient requesting discharge. Patient was educated that he is currently medically unstable and that it is not recommended for discharge today.   Patient elected to be discharged regardless and left AMA.     Pertinent Physical Exam At Time of Discharge  Vitals reviewed.   Constitutional:       Appearance: Normal appearance. He is obese.   HENT:      Head: Normocephalic and atraumatic.      Right Ear: External ear normal.      Left Ear: External ear normal.      Nose: Nose normal.      Mouth/Throat:      Mouth: Mucous membranes are moist.      Pharynx: Oropharynx is clear.   Eyes:      Conjunctiva/sclera: Conjunctivae normal.      Pupils: Pupils are equal, round, and reactive to light.   Cardiovascular:      Rate and Rhythm: Normal rate and regular rhythm.      Pulses: Normal pulses.      Heart sounds: Murmur heard.   Pulmonary:      Breath sounds: diminished throughout     Comments: Conversational dyspnea  Abdominal:      General: Bowel sounds are normal.      Palpations: Abdomen is soft.   Musculoskeletal:         General: Swelling present bilateral neck. Normal range of motion.      Cervical back: Normal range of motion and neck supple.      Right lower leg: Edema present.      Left lower leg: Edema present.   Skin:     General: Skin is warm and dry.   Neurological:      General: No focal deficit present.      Mental Status: He is alert and oriented to person, place, and time.   Psychiatric:         Mood and Affect: Mood normal.         Behavior: Behavior normal.     Outpatient Follow-Up  No future appointments.      Betty Shelton, APRN-CNP

## 2024-12-27 NOTE — CARE PLAN
The patient's goals for the shift include      The clinical goals for the shift include Patient will tolerate lasix drip through 1900    Patient leaving AMA. Patient educated on worsening symptoms and is still choosing to leave.

## 2024-12-27 NOTE — CARE PLAN
The clinical goals for the shift include pt will report free from muscle cramp throughout the shift.    Over the shift, the patient did not make progress toward the following goals. Pt resting quietly throughout the night. AxO, VSS, O2 2L/NC with non productive cough at times. Remain muscle cramp on and off throughout the shift. Pt c/o left chest/LUQ abd area with severe cramping before bedtime. Dr Courtney notified and review lab trop x1 and EKG. No new order after reviewed and continue monitor. Continue Lasix gitt 5mg/hr and 1500 fluid restriction. Pt understanding the POC and no sign of acute distress. Call light within reach.

## 2024-12-28 LAB
ATRIAL RATE: 107 BPM
ATRIAL RATE: 88 BPM
P AXIS: 43 DEGREES
P OFFSET: 162 MS
P ONSET: 97 MS
PR INTERVAL: 168 MS
PR INTERVAL: 184 MS
Q ONSET: 189 MS
Q ONSET: 191 MS
QRS COUNT: 14 BEATS
QRS COUNT: 17 BEATS
QRS DURATION: 136 MS
QRS DURATION: 140 MS
QT INTERVAL: 466 MS
QT INTERVAL: 476 MS
QTC CALCULATION(BAZETT): 575 MS
QTC CALCULATION(BAZETT): 622 MS
QTC FREDERICIA: 541 MS
QTC FREDERICIA: 565 MS
R AXIS: 19 DEGREES
R AXIS: 30 DEGREES
T AXIS: -47 DEGREES
T AXIS: -71 DEGREES
T OFFSET: 424 MS
T OFFSET: 427 MS
VENTRICULAR RATE: 107 BPM
VENTRICULAR RATE: 88 BPM

## 2025-01-09 ENCOUNTER — HOSPITAL ENCOUNTER (EMERGENCY)
Facility: HOSPITAL | Age: 55
Discharge: OTHER INSTITUTION | End: 2025-01-10
Attending: FAMILY MEDICINE
Payer: MEDICARE

## 2025-01-09 ENCOUNTER — APPOINTMENT (OUTPATIENT)
Dept: CARDIOLOGY | Facility: HOSPITAL | Age: 55
End: 2025-01-09
Payer: MEDICARE

## 2025-01-09 ENCOUNTER — APPOINTMENT (OUTPATIENT)
Dept: RADIOLOGY | Facility: HOSPITAL | Age: 55
End: 2025-01-09
Payer: MEDICARE

## 2025-01-09 DIAGNOSIS — N28.9 RENAL INSUFFICIENCY: ICD-10-CM

## 2025-01-09 DIAGNOSIS — R79.89 ELEVATED TROPONIN: ICD-10-CM

## 2025-01-09 DIAGNOSIS — I10 HYPERTENSION, UNSPECIFIED TYPE: ICD-10-CM

## 2025-01-09 DIAGNOSIS — I63.9 CEREBROVASCULAR ACCIDENT (CVA), UNSPECIFIED MECHANISM (MULTI): Primary | ICD-10-CM

## 2025-01-09 LAB
ALBUMIN SERPL BCP-MCNC: 3.9 G/DL (ref 3.4–5)
ALP SERPL-CCNC: 62 U/L (ref 33–120)
ALT SERPL W P-5'-P-CCNC: 10 U/L (ref 10–52)
ANION GAP SERPL CALC-SCNC: 14 MMOL/L (ref 10–20)
APTT PPP: 38 SECONDS (ref 27–38)
AST SERPL W P-5'-P-CCNC: 12 U/L (ref 9–39)
BASOPHILS # BLD AUTO: 0.05 X10*3/UL (ref 0–0.1)
BASOPHILS NFR BLD AUTO: 0.6 %
BILIRUB SERPL-MCNC: 0.2 MG/DL (ref 0–1.2)
BUN SERPL-MCNC: 36 MG/DL (ref 6–23)
CALCIUM SERPL-MCNC: 9 MG/DL (ref 8.6–10.3)
CARDIAC TROPONIN I PNL SERPL HS: 119 NG/L (ref 0–20)
CHLORIDE SERPL-SCNC: 104 MMOL/L (ref 98–107)
CO2 SERPL-SCNC: 19 MMOL/L (ref 21–32)
CREAT SERPL-MCNC: 2.56 MG/DL (ref 0.5–1.3)
EGFRCR SERPLBLD CKD-EPI 2021: 29 ML/MIN/1.73M*2
EOSINOPHIL # BLD AUTO: 0.13 X10*3/UL (ref 0–0.7)
EOSINOPHIL NFR BLD AUTO: 1.6 %
ERYTHROCYTE [DISTWIDTH] IN BLOOD BY AUTOMATED COUNT: 13.8 % (ref 11.5–14.5)
GLUCOSE BLD MANUAL STRIP-MCNC: 115 MG/DL (ref 74–99)
GLUCOSE SERPL-MCNC: 125 MG/DL (ref 74–99)
HCT VFR BLD AUTO: 46.7 % (ref 41–52)
HGB BLD-MCNC: 14.8 G/DL (ref 13.5–17.5)
IMM GRANULOCYTES # BLD AUTO: 0.05 X10*3/UL (ref 0–0.7)
IMM GRANULOCYTES NFR BLD AUTO: 0.6 % (ref 0–0.9)
INR PPP: 1 (ref 0.9–1.1)
LYMPHOCYTES # BLD AUTO: 1.71 X10*3/UL (ref 1.2–4.8)
LYMPHOCYTES NFR BLD AUTO: 20.7 %
MCH RBC QN AUTO: 30.1 PG (ref 26–34)
MCHC RBC AUTO-ENTMCNC: 31.7 G/DL (ref 32–36)
MCV RBC AUTO: 95 FL (ref 80–100)
MONOCYTES # BLD AUTO: 0.89 X10*3/UL (ref 0.1–1)
MONOCYTES NFR BLD AUTO: 10.8 %
NEUTROPHILS # BLD AUTO: 5.44 X10*3/UL (ref 1.2–7.7)
NEUTROPHILS NFR BLD AUTO: 65.7 %
NRBC BLD-RTO: 0 /100 WBCS (ref 0–0)
PLATELET # BLD AUTO: 427 X10*3/UL (ref 150–450)
POTASSIUM SERPL-SCNC: 4 MMOL/L (ref 3.5–5.3)
PROT SERPL-MCNC: 7.7 G/DL (ref 6.4–8.2)
PROTHROMBIN TIME: 11.6 SECONDS (ref 9.8–12.8)
RBC # BLD AUTO: 4.91 X10*6/UL (ref 4.5–5.9)
SODIUM SERPL-SCNC: 133 MMOL/L (ref 136–145)
WBC # BLD AUTO: 8.3 X10*3/UL (ref 4.4–11.3)

## 2025-01-09 PROCEDURE — 2500000004 HC RX 250 GENERAL PHARMACY W/ HCPCS (ALT 636 FOR OP/ED): Mod: TB

## 2025-01-09 PROCEDURE — 36415 COLL VENOUS BLD VENIPUNCTURE: CPT | Performed by: FAMILY MEDICINE

## 2025-01-09 PROCEDURE — 82947 ASSAY GLUCOSE BLOOD QUANT: CPT

## 2025-01-09 PROCEDURE — 85025 COMPLETE CBC W/AUTO DIFF WBC: CPT | Performed by: FAMILY MEDICINE

## 2025-01-09 PROCEDURE — 70450 CT HEAD/BRAIN W/O DYE: CPT | Performed by: SURGERY

## 2025-01-09 PROCEDURE — 96374 THER/PROPH/DIAG INJ IV PUSH: CPT

## 2025-01-09 PROCEDURE — 96376 TX/PRO/DX INJ SAME DRUG ADON: CPT

## 2025-01-09 PROCEDURE — 2500000004 HC RX 250 GENERAL PHARMACY W/ HCPCS (ALT 636 FOR OP/ED): Mod: TB | Performed by: FAMILY MEDICINE

## 2025-01-09 PROCEDURE — 70450 CT HEAD/BRAIN W/O DYE: CPT

## 2025-01-09 PROCEDURE — 80053 COMPREHEN METABOLIC PANEL: CPT | Performed by: FAMILY MEDICINE

## 2025-01-09 PROCEDURE — 85730 THROMBOPLASTIN TIME PARTIAL: CPT | Performed by: FAMILY MEDICINE

## 2025-01-09 PROCEDURE — 84484 ASSAY OF TROPONIN QUANT: CPT | Performed by: FAMILY MEDICINE

## 2025-01-09 PROCEDURE — 99285 EMERGENCY DEPT VISIT HI MDM: CPT | Mod: 25 | Performed by: FAMILY MEDICINE

## 2025-01-09 PROCEDURE — 85610 PROTHROMBIN TIME: CPT | Performed by: FAMILY MEDICINE

## 2025-01-09 PROCEDURE — 93005 ELECTROCARDIOGRAM TRACING: CPT

## 2025-01-09 PROCEDURE — 2500000001 HC RX 250 WO HCPCS SELF ADMINISTERED DRUGS (ALT 637 FOR MEDICARE OP): Performed by: FAMILY MEDICINE

## 2025-01-09 RX ORDER — ASPIRIN 325 MG
325 TABLET ORAL ONCE
Status: COMPLETED | OUTPATIENT
Start: 2025-01-09 | End: 2025-01-09

## 2025-01-09 RX ORDER — LABETALOL HYDROCHLORIDE 5 MG/ML
10 INJECTION, SOLUTION INTRAVENOUS EVERY 10 MIN PRN
Status: DISCONTINUED | OUTPATIENT
Start: 2025-01-09 | End: 2025-01-10 | Stop reason: HOSPADM

## 2025-01-09 RX ORDER — LABETALOL HYDROCHLORIDE 5 MG/ML
INJECTION, SOLUTION INTRAVENOUS
Status: COMPLETED
Start: 2025-01-09 | End: 2025-01-09

## 2025-01-09 RX ADMIN — LABETALOL HYDROCHLORIDE 10 MG: 5 INJECTION, SOLUTION INTRAVENOUS at 19:48

## 2025-01-09 RX ADMIN — ASPIRIN 325 MG ORAL TABLET 325 MG: 325 PILL ORAL at 20:59

## 2025-01-09 RX ADMIN — LABETALOL HYDROCHLORIDE 10 MG: 5 INJECTION, SOLUTION INTRAVENOUS at 19:35

## 2025-01-09 RX ADMIN — LABETALOL HYDROCHLORIDE 10 MG: 5 INJECTION, SOLUTION INTRAVENOUS at 20:18

## 2025-01-09 ASSESSMENT — PAIN SCALES - GENERAL
PAINLEVEL_OUTOF10: 0 - NO PAIN
PAINLEVEL_OUTOF10: 0 - NO PAIN

## 2025-01-09 ASSESSMENT — PAIN - FUNCTIONAL ASSESSMENT: PAIN_FUNCTIONAL_ASSESSMENT: 0-10

## 2025-01-10 ENCOUNTER — APPOINTMENT (OUTPATIENT)
Dept: RADIOLOGY | Facility: HOSPITAL | Age: 55
End: 2025-01-10
Payer: MEDICARE

## 2025-01-10 ENCOUNTER — APPOINTMENT (OUTPATIENT)
Dept: CARDIOLOGY | Facility: HOSPITAL | Age: 55
End: 2025-01-10
Payer: MEDICARE

## 2025-01-10 ENCOUNTER — HOSPITAL ENCOUNTER (INPATIENT)
Facility: HOSPITAL | Age: 55
LOS: 1 days | Discharge: HOME | End: 2025-01-11
Attending: INTERNAL MEDICINE | Admitting: INTERNAL MEDICINE
Payer: MEDICARE

## 2025-01-10 VITALS
DIASTOLIC BLOOD PRESSURE: 120 MMHG | BODY MASS INDEX: 37 KG/M2 | WEIGHT: 273.15 LBS | HEART RATE: 79 BPM | SYSTOLIC BLOOD PRESSURE: 178 MMHG | OXYGEN SATURATION: 97 % | TEMPERATURE: 97.2 F | RESPIRATION RATE: 30 BRPM | HEIGHT: 72 IN

## 2025-01-10 DIAGNOSIS — I50.33 ACUTE ON CHRONIC DIASTOLIC CONGESTIVE HEART FAILURE: ICD-10-CM

## 2025-01-10 DIAGNOSIS — I13.0 HYPERTENSIVE HEART AND CHRONIC KIDNEY DISEASE WITH HEART FAILURE AND STAGE 1 THROUGH STAGE 4 CHRONIC KIDNEY DISEASE, OR UNSPECIFIED CHRONIC KIDNEY DISEASE: ICD-10-CM

## 2025-01-10 DIAGNOSIS — I50.23 ACUTE ON CHRONIC SYSTOLIC HEART FAILURE: ICD-10-CM

## 2025-01-10 DIAGNOSIS — R79.89 ELEVATED TROPONIN LEVEL NOT DUE MYOCARDIAL INFARCTION: ICD-10-CM

## 2025-01-10 DIAGNOSIS — I10 HYPERTENSION, UNSPECIFIED TYPE: ICD-10-CM

## 2025-01-10 DIAGNOSIS — J96.01 ACUTE RESPIRATORY FAILURE WITH HYPOXIA (MULTI): ICD-10-CM

## 2025-01-10 DIAGNOSIS — I10 PRIMARY HYPERTENSION: ICD-10-CM

## 2025-01-10 DIAGNOSIS — R60.0 PERIPHERAL EDEMA: ICD-10-CM

## 2025-01-10 DIAGNOSIS — N20.1 CALCULUS OF DISTAL LEFT URETER: ICD-10-CM

## 2025-01-10 DIAGNOSIS — R91.1 LUNG NODULE: ICD-10-CM

## 2025-01-10 DIAGNOSIS — E87.5 HYPERKALEMIA: ICD-10-CM

## 2025-01-10 DIAGNOSIS — R93.1 DECREASED CARDIAC EJECTION FRACTION: ICD-10-CM

## 2025-01-10 DIAGNOSIS — G51.0 BELL'S PALSY: ICD-10-CM

## 2025-01-10 DIAGNOSIS — I73.9 PERIPHERAL VASCULAR DISEASE, UNSPECIFIED (CMS-HCC): ICD-10-CM

## 2025-01-10 DIAGNOSIS — G51.0 BELL PALSY: ICD-10-CM

## 2025-01-10 DIAGNOSIS — I10 BENIGN ESSENTIAL HTN: ICD-10-CM

## 2025-01-10 DIAGNOSIS — K40.30 INCARCERATED LEFT INGUINAL HERNIA: ICD-10-CM

## 2025-01-10 DIAGNOSIS — N18.2 STAGE 2 CHRONIC KIDNEY DISEASE: ICD-10-CM

## 2025-01-10 DIAGNOSIS — N13.2 HYDRONEPHROSIS CONCURRENT WITH AND DUE TO CALCULI OF KIDNEY AND URETER: ICD-10-CM

## 2025-01-10 DIAGNOSIS — R10.9 ABDOMINAL DISCOMFORT: ICD-10-CM

## 2025-01-10 DIAGNOSIS — R06.00 DYSPNEA, UNSPECIFIED TYPE: ICD-10-CM

## 2025-01-10 DIAGNOSIS — N25.89 ACIDOSIS, RENAL TUBULAR: ICD-10-CM

## 2025-01-10 DIAGNOSIS — R06.02 SHORTNESS OF BREATH: ICD-10-CM

## 2025-01-10 DIAGNOSIS — K40.90 SCROTAL HERNIA: ICD-10-CM

## 2025-01-10 DIAGNOSIS — I50.43 ACUTE ON CHRONIC COMBINED SYSTOLIC (CONGESTIVE) AND DIASTOLIC (CONGESTIVE) HEART FAILURE: ICD-10-CM

## 2025-01-10 DIAGNOSIS — N26.1 KIDNEY ATROPHY: ICD-10-CM

## 2025-01-10 DIAGNOSIS — Z91.148 NON COMPLIANCE W MEDICATION REGIMEN: ICD-10-CM

## 2025-01-10 DIAGNOSIS — R79.89 ELEVATED D-DIMER: ICD-10-CM

## 2025-01-10 DIAGNOSIS — F41.9 ANXIETY AND DEPRESSION: ICD-10-CM

## 2025-01-10 DIAGNOSIS — F32.A ANXIETY AND DEPRESSION: ICD-10-CM

## 2025-01-10 DIAGNOSIS — K40.90 INGUINAL HERNIA WITHOUT OBSTRUCTION OR GANGRENE, RECURRENCE NOT SPECIFIED, UNSPECIFIED LATERALITY: ICD-10-CM

## 2025-01-10 DIAGNOSIS — I63.9 ACUTE CEREBROVASCULAR ACCIDENT (MULTI): Primary | ICD-10-CM

## 2025-01-10 DIAGNOSIS — N17.9 AKI (ACUTE KIDNEY INJURY) (CMS-HCC): ICD-10-CM

## 2025-01-10 DIAGNOSIS — N20.0 NEPHROLITHIASIS: ICD-10-CM

## 2025-01-10 PROBLEM — S37.009A INJURY OF KIDNEY: Status: ACTIVE | Noted: 2025-01-10

## 2025-01-10 PROBLEM — E66.01 MORBID (SEVERE) OBESITY DUE TO EXCESS CALORIES (MULTI): Status: RESOLVED | Noted: 2025-01-10 | Resolved: 2025-01-10

## 2025-01-10 PROBLEM — I50.9 CONGESTIVE HEART FAILURE: Status: ACTIVE | Noted: 2025-01-10

## 2025-01-10 PROBLEM — N18.30 CHRONIC KIDNEY DISEASE, STAGE 3 UNSPECIFIED (MULTI): Status: ACTIVE | Noted: 2025-01-10

## 2025-01-10 LAB
ALBUMIN SERPL BCP-MCNC: 3.7 G/DL (ref 3.4–5)
ALP SERPL-CCNC: 64 U/L (ref 33–120)
ALT SERPL W P-5'-P-CCNC: 11 U/L (ref 10–52)
ANION GAP BLDV CALCULATED.4IONS-SCNC: 15 MMOL/L (ref 10–25)
ANION GAP SERPL CALC-SCNC: 16 MMOL/L (ref 10–20)
AORTIC VALVE MEAN GRADIENT: 3 MMHG
AORTIC VALVE PEAK VELOCITY: 1.16 M/S
AST SERPL W P-5'-P-CCNC: 14 U/L (ref 9–39)
ATRIAL RATE: 110 BPM
ATRIAL RATE: 93 BPM
AV PEAK GRADIENT: 5 MMHG
AVA (PEAK VEL): 3.92 CM2
AVA (VTI): 3.44 CM2
BASE EXCESS BLDV CALC-SCNC: -6.9 MMOL/L (ref -2–3)
BILIRUB SERPL-MCNC: 0.3 MG/DL (ref 0–1.2)
BODY TEMPERATURE: ABNORMAL
BUN SERPL-MCNC: 32 MG/DL (ref 6–23)
C4 SERPL-MCNC: 46 MG/DL (ref 10–50)
CA-I BLDV-SCNC: 1.18 MMOL/L (ref 1.1–1.33)
CALCIUM SERPL-MCNC: 9 MG/DL (ref 8.6–10.3)
CARDIAC TROPONIN I PNL SERPL HS: 101 NG/L (ref 0–20)
CHLORIDE BLDV-SCNC: 108 MMOL/L (ref 98–107)
CHLORIDE SERPL-SCNC: 109 MMOL/L (ref 98–107)
CHOLEST SERPL-MCNC: 205 MG/DL (ref 0–199)
CHOLESTEROL/HDL RATIO: 5.3
CO2 SERPL-SCNC: 17 MMOL/L (ref 21–32)
CREAT SERPL-MCNC: 2.35 MG/DL (ref 0.5–1.3)
EGFRCR SERPLBLD CKD-EPI 2021: 32 ML/MIN/1.73M*2
EJECTION FRACTION APICAL 4 CHAMBER: 33.1
EJECTION FRACTION: 35 %
ERYTHROCYTE [DISTWIDTH] IN BLOOD BY AUTOMATED COUNT: 13.7 % (ref 11.5–14.5)
EST. AVERAGE GLUCOSE BLD GHB EST-MCNC: 128 MG/DL
FLUAV RNA RESP QL NAA+PROBE: NOT DETECTED
FLUBV RNA RESP QL NAA+PROBE: NOT DETECTED
GLUCOSE BLD MANUAL STRIP-MCNC: 117 MG/DL (ref 74–99)
GLUCOSE BLD MANUAL STRIP-MCNC: 179 MG/DL (ref 74–99)
GLUCOSE BLD MANUAL STRIP-MCNC: 187 MG/DL (ref 74–99)
GLUCOSE BLDV-MCNC: 135 MG/DL (ref 74–99)
GLUCOSE SERPL-MCNC: 115 MG/DL (ref 74–99)
HBA1C MFR BLD: 6.1 %
HCO3 BLDV-SCNC: 17.8 MMOL/L (ref 22–26)
HCT VFR BLD AUTO: 46.6 % (ref 41–52)
HCT VFR BLD EST: 47 % (ref 41–52)
HDLC SERPL-MCNC: 38.8 MG/DL
HGB BLD-MCNC: 15.2 G/DL (ref 13.5–17.5)
HGB BLDV-MCNC: 15.8 G/DL (ref 13.5–17.5)
INHALED O2 CONCENTRATION: 21 %
LACTATE BLDV-SCNC: 1.3 MMOL/L (ref 0.4–2)
LDLC SERPL CALC-MCNC: 116 MG/DL
LEFT ATRIUM VOLUME AREA LENGTH INDEX BSA: 45.1 ML/M2
LEFT VENTRICLE INTERNAL DIMENSION DIASTOLE: 6.72 CM (ref 3.5–6)
LEFT VENTRICULAR OUTFLOW TRACT DIAMETER: 2.49 CM
MAGNESIUM SERPL-MCNC: 2.05 MG/DL (ref 1.6–2.4)
MCH RBC QN AUTO: 30.1 PG (ref 26–34)
MCHC RBC AUTO-ENTMCNC: 32.6 G/DL (ref 32–36)
MCV RBC AUTO: 92 FL (ref 80–100)
NON HDL CHOLESTEROL: 166 MG/DL (ref 0–149)
NRBC BLD-RTO: 0 /100 WBCS (ref 0–0)
OXYHGB MFR BLDV: 81 % (ref 45–75)
P AXIS: 54 DEGREES
P AXIS: 66 DEGREES
P OFFSET: 186 MS
P OFFSET: 194 MS
P ONSET: 122 MS
P ONSET: 129 MS
PCO2 BLDV: 33 MM HG (ref 41–51)
PH BLDV: 7.34 PH (ref 7.33–7.43)
PHOSPHATE SERPL-MCNC: 3.7 MG/DL (ref 2.5–4.9)
PLATELET # BLD AUTO: 440 X10*3/UL (ref 150–450)
PO2 BLDV: 45 MM HG (ref 35–45)
POTASSIUM BLDV-SCNC: 4.5 MMOL/L (ref 3.5–5.3)
POTASSIUM SERPL-SCNC: 4.3 MMOL/L (ref 3.5–5.3)
PR INTERVAL: 164 MS
PR INTERVAL: 186 MS
PROT SERPL-MCNC: 7.6 G/DL (ref 6.4–8.2)
Q ONSET: 211 MS
Q ONSET: 215 MS
QRS COUNT: 15 BEATS
QRS COUNT: 18 BEATS
QRS DURATION: 136 MS
QRS DURATION: 152 MS
QT INTERVAL: 378 MS
QT INTERVAL: 424 MS
QTC CALCULATION(BAZETT): 511 MS
QTC CALCULATION(BAZETT): 527 MS
QTC FREDERICIA: 463 MS
QTC FREDERICIA: 491 MS
R AXIS: -24 DEGREES
R AXIS: -36 DEGREES
RBC # BLD AUTO: 5.05 X10*6/UL (ref 4.5–5.9)
RIGHT VENTRICLE FREE WALL PEAK S': 12 CM/S
RIGHT VENTRICLE PEAK SYSTOLIC PRESSURE: 66.2 MMHG
RSV RNA RESP QL NAA+PROBE: NOT DETECTED
SAO2 % BLDV: 82 % (ref 45–75)
SARS-COV-2 RNA RESP QL NAA+PROBE: NOT DETECTED
SODIUM BLDV-SCNC: 136 MMOL/L (ref 136–145)
SODIUM SERPL-SCNC: 138 MMOL/L (ref 136–145)
T AXIS: 103 DEGREES
T AXIS: 59 DEGREES
T OFFSET: 400 MS
T OFFSET: 427 MS
TRICUSPID ANNULAR PLANE SYSTOLIC EXCURSION: 2.8 CM
TRIGL SERPL-MCNC: 252 MG/DL (ref 0–149)
VENTRICULAR RATE: 110 BPM
VENTRICULAR RATE: 93 BPM
VLDL: 50 MG/DL (ref 0–40)
WBC # BLD AUTO: 12 X10*3/UL (ref 4.4–11.3)

## 2025-01-10 PROCEDURE — 80061 LIPID PANEL: CPT

## 2025-01-10 PROCEDURE — 82947 ASSAY GLUCOSE BLOOD QUANT: CPT

## 2025-01-10 PROCEDURE — 2500000004 HC RX 250 GENERAL PHARMACY W/ HCPCS (ALT 636 FOR OP/ED)

## 2025-01-10 PROCEDURE — 84100 ASSAY OF PHOSPHORUS: CPT

## 2025-01-10 PROCEDURE — 70551 MRI BRAIN STEM W/O DYE: CPT

## 2025-01-10 PROCEDURE — 2500000001 HC RX 250 WO HCPCS SELF ADMINISTERED DRUGS (ALT 637 FOR MEDICARE OP)

## 2025-01-10 PROCEDURE — 87449 NOS EACH ORGANISM AG IA: CPT | Mod: GEALAB

## 2025-01-10 PROCEDURE — 86618 LYME DISEASE ANTIBODY: CPT | Mod: GEALAB | Performed by: STUDENT IN AN ORGANIZED HEALTH CARE EDUCATION/TRAINING PROGRAM

## 2025-01-10 PROCEDURE — 86160 COMPLEMENT ANTIGEN: CPT

## 2025-01-10 PROCEDURE — 87637 SARSCOV2&INF A&B&RSV AMP PRB: CPT

## 2025-01-10 PROCEDURE — 99223 1ST HOSP IP/OBS HIGH 75: CPT | Performed by: STUDENT IN AN ORGANIZED HEALTH CARE EDUCATION/TRAINING PROGRAM

## 2025-01-10 PROCEDURE — 86161 COMPLEMENT/FUNCTION ACTIVITY: CPT

## 2025-01-10 PROCEDURE — G0378 HOSPITAL OBSERVATION PER HR: HCPCS

## 2025-01-10 PROCEDURE — 36415 COLL VENOUS BLD VENIPUNCTURE: CPT

## 2025-01-10 PROCEDURE — C8929 TTE W OR WO FOL WCON,DOPPLER: HCPCS

## 2025-01-10 PROCEDURE — 83036 HEMOGLOBIN GLYCOSYLATED A1C: CPT | Mod: GEALAB

## 2025-01-10 PROCEDURE — 93306 TTE W/DOPPLER COMPLETE: CPT | Performed by: INTERNAL MEDICINE

## 2025-01-10 PROCEDURE — 93005 ELECTROCARDIOGRAM TRACING: CPT

## 2025-01-10 PROCEDURE — 2500000005 HC RX 250 GENERAL PHARMACY W/O HCPCS

## 2025-01-10 PROCEDURE — 2500000002 HC RX 250 W HCPCS SELF ADMINISTERED DRUGS (ALT 637 FOR MEDICARE OP, ALT 636 FOR OP/ED)

## 2025-01-10 PROCEDURE — 86160 COMPLEMENT ANTIGEN: CPT | Mod: GEALAB

## 2025-01-10 PROCEDURE — 87502 INFLUENZA DNA AMP PROBE: CPT

## 2025-01-10 PROCEDURE — 82435 ASSAY OF BLOOD CHLORIDE: CPT

## 2025-01-10 PROCEDURE — 71046 X-RAY EXAM CHEST 2 VIEWS: CPT

## 2025-01-10 PROCEDURE — 92610 EVALUATE SWALLOWING FUNCTION: CPT | Mod: GN

## 2025-01-10 PROCEDURE — 85027 COMPLETE CBC AUTOMATED: CPT

## 2025-01-10 PROCEDURE — 70547 MR ANGIOGRAPHY NECK W/O DYE: CPT | Mod: 52

## 2025-01-10 PROCEDURE — 99223 1ST HOSP IP/OBS HIGH 75: CPT

## 2025-01-10 PROCEDURE — 87899 AGENT NOS ASSAY W/OPTIC: CPT | Mod: GEALAB

## 2025-01-10 PROCEDURE — 84484 ASSAY OF TROPONIN QUANT: CPT

## 2025-01-10 PROCEDURE — 71046 X-RAY EXAM CHEST 2 VIEWS: CPT | Performed by: STUDENT IN AN ORGANIZED HEALTH CARE EDUCATION/TRAINING PROGRAM

## 2025-01-10 PROCEDURE — 86617 LYME DISEASE ANTIBODY: CPT | Mod: GEALAB | Performed by: STUDENT IN AN ORGANIZED HEALTH CARE EDUCATION/TRAINING PROGRAM

## 2025-01-10 PROCEDURE — 80053 COMPREHEN METABOLIC PANEL: CPT

## 2025-01-10 PROCEDURE — 83735 ASSAY OF MAGNESIUM: CPT

## 2025-01-10 PROCEDURE — 84132 ASSAY OF SERUM POTASSIUM: CPT

## 2025-01-10 RX ORDER — TORSEMIDE 20 MG/1
20 TABLET ORAL DAILY
Status: DISCONTINUED | OUTPATIENT
Start: 2025-01-10 | End: 2025-01-11 | Stop reason: HOSPADM

## 2025-01-10 RX ORDER — ENOXAPARIN SODIUM 100 MG/ML
40 INJECTION SUBCUTANEOUS EVERY 24 HOURS
Status: DISCONTINUED | OUTPATIENT
Start: 2025-01-10 | End: 2025-01-11 | Stop reason: HOSPADM

## 2025-01-10 RX ORDER — HYDRALAZINE HYDROCHLORIDE 25 MG/1
25 TABLET, FILM COATED ORAL EVERY 6 HOURS PRN
Status: DISCONTINUED | OUTPATIENT
Start: 2025-01-12 | End: 2025-01-10

## 2025-01-10 RX ORDER — ATORVASTATIN CALCIUM 80 MG/1
80 TABLET, FILM COATED ORAL NIGHTLY
Status: DISCONTINUED | OUTPATIENT
Start: 2025-01-10 | End: 2025-01-10

## 2025-01-10 RX ORDER — ATORVASTATIN CALCIUM 80 MG/1
80 TABLET, FILM COATED ORAL NIGHTLY
Status: DISCONTINUED | OUTPATIENT
Start: 2025-01-10 | End: 2025-01-11 | Stop reason: HOSPADM

## 2025-01-10 RX ORDER — METOPROLOL SUCCINATE 50 MG/1
50 TABLET, EXTENDED RELEASE ORAL DAILY
Status: DISCONTINUED | OUTPATIENT
Start: 2025-01-10 | End: 2025-01-11 | Stop reason: HOSPADM

## 2025-01-10 RX ORDER — INSULIN LISPRO 100 [IU]/ML
0-10 INJECTION, SOLUTION INTRAVENOUS; SUBCUTANEOUS
Status: DISCONTINUED | OUTPATIENT
Start: 2025-01-10 | End: 2025-01-11 | Stop reason: HOSPADM

## 2025-01-10 RX ORDER — DULOXETIN HYDROCHLORIDE 30 MG/1
30 CAPSULE, DELAYED RELEASE ORAL DAILY
Status: DISCONTINUED | OUTPATIENT
Start: 2025-01-10 | End: 2025-01-11 | Stop reason: HOSPADM

## 2025-01-10 RX ORDER — ISOSORBIDE MONONITRATE 30 MG/1
30 TABLET, EXTENDED RELEASE ORAL DAILY
Status: DISCONTINUED | OUTPATIENT
Start: 2025-01-11 | End: 2025-01-11

## 2025-01-10 RX ORDER — LABETALOL HYDROCHLORIDE 5 MG/ML
10 INJECTION, SOLUTION INTRAVENOUS EVERY 10 MIN PRN
Status: DISCONTINUED | OUTPATIENT
Start: 2025-01-10 | End: 2025-01-10

## 2025-01-10 RX ORDER — CLOPIDOGREL BISULFATE 75 MG/1
75 TABLET ORAL DAILY
Status: DISCONTINUED | OUTPATIENT
Start: 2025-01-10 | End: 2025-01-11 | Stop reason: HOSPADM

## 2025-01-10 RX ORDER — HYDRALAZINE HYDROCHLORIDE 20 MG/ML
10 INJECTION INTRAMUSCULAR; INTRAVENOUS
Status: DISCONTINUED | OUTPATIENT
Start: 2025-01-10 | End: 2025-01-10

## 2025-01-10 RX ORDER — FUROSEMIDE 10 MG/ML
80 INJECTION INTRAMUSCULAR; INTRAVENOUS 2 TIMES DAILY
Status: DISCONTINUED | OUTPATIENT
Start: 2025-01-10 | End: 2025-01-11 | Stop reason: HOSPADM

## 2025-01-10 RX ORDER — VALACYCLOVIR HYDROCHLORIDE 500 MG/1
1000 TABLET, FILM COATED ORAL EVERY 12 HOURS SCHEDULED
Status: DISCONTINUED | OUTPATIENT
Start: 2025-01-10 | End: 2025-01-10

## 2025-01-10 RX ORDER — ATORVASTATIN CALCIUM 40 MG/1
40 TABLET, FILM COATED ORAL NIGHTLY
Status: DISCONTINUED | OUTPATIENT
Start: 2025-01-10 | End: 2025-01-10

## 2025-01-10 RX ORDER — AMOXICILLIN 250 MG
2 CAPSULE ORAL 2 TIMES DAILY
Status: DISCONTINUED | OUTPATIENT
Start: 2025-01-10 | End: 2025-01-10

## 2025-01-10 RX ORDER — ACETAMINOPHEN 325 MG/1
650 TABLET ORAL EVERY 4 HOURS PRN
Status: DISCONTINUED | OUTPATIENT
Start: 2025-01-10 | End: 2025-01-11 | Stop reason: HOSPADM

## 2025-01-10 RX ORDER — FUROSEMIDE 10 MG/ML
40 INJECTION INTRAMUSCULAR; INTRAVENOUS 2 TIMES DAILY
Status: DISCONTINUED | OUTPATIENT
Start: 2025-01-10 | End: 2025-01-10

## 2025-01-10 RX ORDER — FUROSEMIDE 10 MG/ML
40 INJECTION INTRAMUSCULAR; INTRAVENOUS ONCE
Status: COMPLETED | OUTPATIENT
Start: 2025-01-10 | End: 2025-01-10

## 2025-01-10 RX ORDER — PREDNISONE 20 MG/1
60 TABLET ORAL DAILY
Status: DISCONTINUED | OUTPATIENT
Start: 2025-01-10 | End: 2025-01-11 | Stop reason: HOSPADM

## 2025-01-10 RX ORDER — VALACYCLOVIR HYDROCHLORIDE 500 MG/1
1000 TABLET, FILM COATED ORAL EVERY 12 HOURS SCHEDULED
Status: DISCONTINUED | OUTPATIENT
Start: 2025-01-10 | End: 2025-01-11 | Stop reason: HOSPADM

## 2025-01-10 RX ORDER — DEXTROSE 50 % IN WATER (D50W) INTRAVENOUS SYRINGE
12.5
Status: DISCONTINUED | OUTPATIENT
Start: 2025-01-10 | End: 2025-01-11 | Stop reason: HOSPADM

## 2025-01-10 RX ORDER — AMLODIPINE BESYLATE 10 MG/1
10 TABLET ORAL DAILY
Status: DISCONTINUED | OUTPATIENT
Start: 2025-01-10 | End: 2025-01-11 | Stop reason: HOSPADM

## 2025-01-10 RX ORDER — DEXTROSE 50 % IN WATER (D50W) INTRAVENOUS SYRINGE
25
Status: DISCONTINUED | OUTPATIENT
Start: 2025-01-10 | End: 2025-01-11 | Stop reason: HOSPADM

## 2025-01-10 RX ORDER — NAPROXEN SODIUM 220 MG/1
81 TABLET, FILM COATED ORAL DAILY
Status: DISCONTINUED | OUTPATIENT
Start: 2025-01-10 | End: 2025-01-11 | Stop reason: HOSPADM

## 2025-01-10 RX ORDER — ACETAMINOPHEN 160 MG/5ML
650 SOLUTION ORAL EVERY 4 HOURS PRN
Status: DISCONTINUED | OUTPATIENT
Start: 2025-01-10 | End: 2025-01-11 | Stop reason: HOSPADM

## 2025-01-10 RX ORDER — AMOXICILLIN 250 MG
2 CAPSULE ORAL 2 TIMES DAILY
Status: DISCONTINUED | OUTPATIENT
Start: 2025-01-10 | End: 2025-01-11 | Stop reason: HOSPADM

## 2025-01-10 RX ADMIN — ASPIRIN 81 MG 81 MG: 81 TABLET ORAL at 09:16

## 2025-01-10 RX ADMIN — FUROSEMIDE 40 MG: 10 INJECTION, SOLUTION INTRAMUSCULAR; INTRAVENOUS at 13:47

## 2025-01-10 RX ADMIN — FUROSEMIDE 40 MG: 10 INJECTION, SOLUTION INTRAMUSCULAR; INTRAVENOUS at 14:46

## 2025-01-10 RX ADMIN — VALACYCLOVIR 1000 MG: 500 TABLET, FILM COATED ORAL at 20:43

## 2025-01-10 RX ADMIN — ACETAMINOPHEN 650 MG: 325 TABLET, FILM COATED ORAL at 11:37

## 2025-01-10 RX ADMIN — ATORVASTATIN CALCIUM 80 MG: 80 TABLET, FILM COATED ORAL at 20:42

## 2025-01-10 RX ADMIN — CLOPIDOGREL 75 MG: 75 TABLET ORAL at 09:16

## 2025-01-10 RX ADMIN — FUROSEMIDE 80 MG: 10 INJECTION, SOLUTION INTRAMUSCULAR; INTRAVENOUS at 20:42

## 2025-01-10 RX ADMIN — WHITE PETROLATUM 57.7 %-MINERAL OIL 31.9 % EYE OINTMENT 1 APPLICATION: at 23:50

## 2025-01-10 RX ADMIN — WHITE PETROLATUM 57.7 %-MINERAL OIL 31.9 % EYE OINTMENT 1 APPLICATION: at 20:43

## 2025-01-10 RX ADMIN — PERFLUTREN 2 ML OF DILUTION: 6.52 INJECTION, SUSPENSION INTRAVENOUS at 09:55

## 2025-01-10 RX ADMIN — PREDNISONE 60 MG: 20 TABLET ORAL at 13:47

## 2025-01-10 RX ADMIN — INSULIN LISPRO 2 UNITS: 100 INJECTION, SOLUTION INTRAVENOUS; SUBCUTANEOUS at 16:29

## 2025-01-10 RX ADMIN — SENNOSIDES AND DOCUSATE SODIUM 2 TABLET: 50; 8.6 TABLET ORAL at 20:42

## 2025-01-10 SDOH — ECONOMIC STABILITY: HOUSING INSECURITY: AT ANY TIME IN THE PAST 12 MONTHS, WERE YOU HOMELESS OR LIVING IN A SHELTER (INCLUDING NOW)?: NO

## 2025-01-10 SDOH — ECONOMIC STABILITY: INCOME INSECURITY: IN THE PAST 12 MONTHS HAS THE ELECTRIC, GAS, OIL, OR WATER COMPANY THREATENED TO SHUT OFF SERVICES IN YOUR HOME?: NO

## 2025-01-10 SDOH — SOCIAL STABILITY: SOCIAL INSECURITY: HAVE YOU HAD THOUGHTS OF HARMING ANYONE ELSE?: NO

## 2025-01-10 SDOH — SOCIAL STABILITY: SOCIAL INSECURITY: HAVE YOU HAD ANY THOUGHTS OF HARMING ANYONE ELSE?: NO

## 2025-01-10 SDOH — SOCIAL STABILITY: SOCIAL INSECURITY: WERE YOU ABLE TO COMPLETE ALL THE BEHAVIORAL HEALTH SCREENINGS?: YES

## 2025-01-10 SDOH — ECONOMIC STABILITY: HOUSING INSECURITY: IN THE LAST 12 MONTHS, WAS THERE A TIME WHEN YOU WERE NOT ABLE TO PAY THE MORTGAGE OR RENT ON TIME?: NO

## 2025-01-10 SDOH — ECONOMIC STABILITY: FOOD INSECURITY: WITHIN THE PAST 12 MONTHS, THE FOOD YOU BOUGHT JUST DIDN'T LAST AND YOU DIDN'T HAVE MONEY TO GET MORE.: NEVER TRUE

## 2025-01-10 SDOH — ECONOMIC STABILITY: FOOD INSECURITY: WITHIN THE PAST 12 MONTHS, YOU WORRIED THAT YOUR FOOD WOULD RUN OUT BEFORE YOU GOT THE MONEY TO BUY MORE.: NEVER TRUE

## 2025-01-10 SDOH — SOCIAL STABILITY: SOCIAL INSECURITY: WITHIN THE LAST YEAR, HAVE YOU BEEN HUMILIATED OR EMOTIONALLY ABUSED IN OTHER WAYS BY YOUR PARTNER OR EX-PARTNER?: NO

## 2025-01-10 SDOH — SOCIAL STABILITY: SOCIAL INSECURITY: DOES ANYONE TRY TO KEEP YOU FROM HAVING/CONTACTING OTHER FRIENDS OR DOING THINGS OUTSIDE YOUR HOME?: NO

## 2025-01-10 SDOH — SOCIAL STABILITY: SOCIAL INSECURITY: ARE THERE ANY APPARENT SIGNS OF INJURIES/BEHAVIORS THAT COULD BE RELATED TO ABUSE/NEGLECT?: NO

## 2025-01-10 SDOH — SOCIAL STABILITY: SOCIAL INSECURITY: WITHIN THE LAST YEAR, HAVE YOU BEEN AFRAID OF YOUR PARTNER OR EX-PARTNER?: NO

## 2025-01-10 SDOH — SOCIAL STABILITY: SOCIAL INSECURITY: HAS ANYONE EVER THREATENED TO HURT YOUR FAMILY OR YOUR PETS?: NO

## 2025-01-10 SDOH — SOCIAL STABILITY: SOCIAL INSECURITY: DO YOU FEEL UNSAFE GOING BACK TO THE PLACE WHERE YOU ARE LIVING?: NO

## 2025-01-10 SDOH — ECONOMIC STABILITY: TRANSPORTATION INSECURITY: IN THE PAST 12 MONTHS, HAS LACK OF TRANSPORTATION KEPT YOU FROM MEDICAL APPOINTMENTS OR FROM GETTING MEDICATIONS?: NO

## 2025-01-10 SDOH — ECONOMIC STABILITY: FOOD INSECURITY: HOW HARD IS IT FOR YOU TO PAY FOR THE VERY BASICS LIKE FOOD, HOUSING, MEDICAL CARE, AND HEATING?: NOT HARD AT ALL

## 2025-01-10 SDOH — ECONOMIC STABILITY: HOUSING INSECURITY: IN THE PAST 12 MONTHS, HOW MANY TIMES HAVE YOU MOVED WHERE YOU WERE LIVING?: 1

## 2025-01-10 SDOH — SOCIAL STABILITY: SOCIAL INSECURITY: DO YOU FEEL ANYONE HAS EXPLOITED OR TAKEN ADVANTAGE OF YOU FINANCIALLY OR OF YOUR PERSONAL PROPERTY?: NO

## 2025-01-10 SDOH — SOCIAL STABILITY: SOCIAL INSECURITY: ARE YOU OR HAVE YOU BEEN THREATENED OR ABUSED PHYSICALLY, EMOTIONALLY, OR SEXUALLY BY ANYONE?: NO

## 2025-01-10 SDOH — SOCIAL STABILITY: SOCIAL INSECURITY: ABUSE: ADULT

## 2025-01-10 ASSESSMENT — PAIN - FUNCTIONAL ASSESSMENT
PAIN_FUNCTIONAL_ASSESSMENT: 0-10

## 2025-01-10 ASSESSMENT — LIFESTYLE VARIABLES
HOW OFTEN DO YOU HAVE A DRINK CONTAINING ALCOHOL: NEVER
AUDIT-C TOTAL SCORE: 0
AUDIT-C TOTAL SCORE: 0
HOW MANY STANDARD DRINKS CONTAINING ALCOHOL DO YOU HAVE ON A TYPICAL DAY: PATIENT DOES NOT DRINK
HOW OFTEN DO YOU HAVE 6 OR MORE DRINKS ON ONE OCCASION: NEVER
SKIP TO QUESTIONS 9-10: 1

## 2025-01-10 ASSESSMENT — COGNITIVE AND FUNCTIONAL STATUS - GENERAL
MOBILITY SCORE: 24
DAILY ACTIVITIY SCORE: 24
MOBILITY SCORE: 24
DAILY ACTIVITIY SCORE: 24
DAILY ACTIVITIY SCORE: 24
MOBILITY SCORE: 24
PATIENT BASELINE BEDBOUND: NO

## 2025-01-10 ASSESSMENT — ACTIVITIES OF DAILY LIVING (ADL)
PATIENT'S MEMORY ADEQUATE TO SAFELY COMPLETE DAILY ACTIVITIES?: YES
HEARING - LEFT EAR: FUNCTIONAL
BATHING: INDEPENDENT
DRESSING YOURSELF: INDEPENDENT
HEARING - RIGHT EAR: FUNCTIONAL
JUDGMENT_ADEQUATE_SAFELY_COMPLETE_DAILY_ACTIVITIES: YES
TOILETING: INDEPENDENT
ADEQUATE_TO_COMPLETE_ADL: YES
FEEDING YOURSELF: INDEPENDENT
WALKS IN HOME: INDEPENDENT
LACK_OF_TRANSPORTATION: NO
GROOMING: INDEPENDENT

## 2025-01-10 ASSESSMENT — PAIN SCALES - GENERAL
PAINLEVEL_OUTOF10: 0 - NO PAIN
PAINLEVEL_OUTOF10: 1
PAINLEVEL_OUTOF10: 3

## 2025-01-10 ASSESSMENT — PATIENT HEALTH QUESTIONNAIRE - PHQ9
SUM OF ALL RESPONSES TO PHQ9 QUESTIONS 1 & 2: 0
2. FEELING DOWN, DEPRESSED OR HOPELESS: NOT AT ALL
1. LITTLE INTEREST OR PLEASURE IN DOING THINGS: NOT AT ALL

## 2025-01-10 ASSESSMENT — PAIN DESCRIPTION - LOCATION: LOCATION: HEAD

## 2025-01-10 NOTE — PROGRESS NOTES
01/10/25 1446   Discharge Planning   Support Systems Friends/neighbors   Assistance Needed Alert and oriented x 3, Independent with ADL's, Doesn't drive(neighbor transports patient to appointments and stores, No DME used at home   Type of Residence Private residence   Number of Stairs to Enter Residence 3   Number of Stairs Within Residence 24  (12 steps upstairs and 12 steps to basement, patient does not use)   Do you have animals or pets at home? No   Who is requesting discharge planning? Provider   Home or Post Acute Services None   Expected Discharge Disposition Home   Does the patient need discharge transport arranged? No   RoundTrip coordination needed? No   Has discharge transport been arranged? No   Financial Resource Strain   How hard is it for you to pay for the very basics like food, housing, medical care, and heating? Not hard   Housing Stability   In the last 12 months, was there a time when you were not able to pay the mortgage or rent on time? N   In the past 12 months, how many times have you moved where you were living? 0   At any time in the past 12 months, were you homeless or living in a shelter (including now)? N   Transportation Needs   In the past 12 months, has lack of transportation kept you from medical appointments or from getting medications? no   In the past 12 months, has lack of transportation kept you from meetings, work, or from getting things needed for daily living? No   Patient Choice   Provider Choice list and CMS website (https://medicare.gov/care-compare#search) for post-acute Quality and Resource Measure Data were provided and reviewed with: Patient   Patient / Family choosing to utilize agency / facility established prior to hospitalization No   Stroke Family Assessment   Stroke Family Assessment Needed No   Intensity of Service   Intensity of Service 0-30 min

## 2025-01-10 NOTE — ED PROVIDER NOTES
HPI   Chief Complaint   Patient presents with    Stroke       54-year-old male with history of morbid obesity, anxiety, depression, noncompliance with medications, acute on chronic CHF, and sleep apnea brought to the ED by neighbor due to concern of patient complaining of difficulty with his left side of his body.  Per the report there was provided patient was complaining of facial tingling and difficulty with function of the left side of his face around 1 AM yesterday.  Patient reports the symptoms continue to progress into his left arm and left leg and eventually when a neighbor stop by to check up on him he told other complaints and she convinced him to come to the ED.  Patient upon arrival to ED was alert, cooperative, appeared uncomfortable, and exhibiting signs of acute stroke.  Patient reports at this time that he feels some pain in his left arm as well as some heaviness in his left leg and difficulty with moving his face and tingling in his face.  Patient also reports that his blood pressure has been high for some time now and not sure how much that is been contributing to it as well.  No further information is available at this time regarding any other concerns.      History provided by:  Patient, friend and medical records   used: No            Patient History   Past Medical History:   Diagnosis Date    Acute on chronic combined systolic (congestive) and diastolic (congestive) heart failure 12/26/2024    Acute respiratory failure with hypoxia (Multi) 12/26/2024    Anxiety and depression 12/26/2024    Kidney stones     Non compliance w medication regimen 12/26/2024     History reviewed. No pertinent surgical history.  No family history on file.  Social History     Tobacco Use    Smoking status: Never    Smokeless tobacco: Never   Vaping Use    Vaping status: Never Used   Substance Use Topics    Alcohol use: Not Currently    Drug use: Never       Physical Exam   ED Triage Vitals    Temperature Heart Rate Respirations BP   01/10/25 0015 01/09/25 1929 01/09/25 1929 01/09/25 1929   36.7 °C (98.1 °F) (!) 110 19 (!) 228/132      SpO2 Temp src Heart Rate Source Patient Position   01/09/25 1929 -- -- --   95 %         BP Location FiO2 (%)     -- --             Physical Exam  Vitals and nursing note reviewed.   Constitutional:       General: He is not in acute distress.     Appearance: He is well-developed.   HENT:      Head: Normocephalic and atraumatic.   Eyes:      General: No visual field deficit.     Conjunctiva/sclera: Conjunctivae normal.   Cardiovascular:      Rate and Rhythm: Regular rhythm. Tachycardia present.      Pulses: Normal pulses.      Heart sounds: Normal heart sounds, S1 normal and S2 normal. No murmur heard.  Pulmonary:      Effort: Pulmonary effort is normal. No respiratory distress.      Breath sounds: Normal breath sounds.   Abdominal:      Palpations: Abdomen is soft.      Tenderness: There is no abdominal tenderness.   Musculoskeletal:         General: No swelling.      Cervical back: Neck supple.   Skin:     General: Skin is warm and dry.      Capillary Refill: Capillary refill takes less than 2 seconds.   Neurological:      Mental Status: He is alert and oriented to person, place, and time.      GCS: GCS eye subscore is 4. GCS verbal subscore is 5. GCS motor subscore is 6.      Cranial Nerves: Cranial nerve deficit and facial asymmetry present. No dysarthria.      Sensory: Sensory deficit present.      Motor: Weakness present. No tremor, atrophy, abnormal muscle tone, seizure activity or pronator drift.      Coordination: Coordination is intact.      Gait: Gait is intact.      Deep Tendon Reflexes:      Reflex Scores:       Tricep reflexes are 2+ on the right side and 2+ on the left side.       Bicep reflexes are 2+ on the right side and 2+ on the left side.       Brachioradialis reflexes are 2+ on the right side and 2+ on the left side.       Patellar reflexes are 2+ on  the right side and 2+ on the left side.       Achilles reflexes are 2+ on the right side and 2+ on the left side.     Comments: NIH 4   Psychiatric:         Mood and Affect: Mood normal.           ED Course & MDM   Diagnoses as of 01/10/25 0207   Cerebrovascular accident (CVA), unspecified mechanism (Multi)   Hypertension, unspecified type   Renal insufficiency   Elevated troponin                 No data recorded     [place] Coma Scale Score: 15 (01/09/25 2322 : Hector Cardozo, RN)       NIH Stroke Scale: 4 (01/10/25 0108 : Hector Cardozo RN)                 CT brain attack head wo IV contrast   Final Result   No evidence of acute cortical infarct or intracranial hemorrhage.                  MACRO:   Shlomo Stallings discussed the significance and urgency of this critical   finding by epic secure chat with  DANYELL DIOR on 1/9/2025 at 7:54   pm.  (**-RCF-**) Findings:  See findings.             Signed by: Shlomo Stallings 1/9/2025 7:55 PM   Dictation workstation:   [id number]        Labs Reviewed   CBC WITH AUTO DIFFERENTIAL - Abnormal       Result Value    WBC 8.3      nRBC 0.0      RBC 4.91      Hemoglobin 14.8      Hematocrit 46.7      MCV 95      MCH 30.1      MCHC 31.7 (*)     RDW 13.8      Platelets 427      Neutrophils % 65.7      Immature Granulocytes %, Automated 0.6      Lymphocytes % 20.7      Monocytes % 10.8      Eosinophils % 1.6      Basophils % 0.6      Neutrophils Absolute 5.44      Immature Granulocytes Absolute, Automated 0.05      Lymphocytes Absolute 1.71      Monocytes Absolute 0.89      Eosinophils Absolute 0.13      Basophils Absolute 0.05     COMPREHENSIVE METABOLIC PANEL - Abnormal    Glucose 125 (*)     Sodium 133 (*)     Potassium 4.0      Chloride 104      Bicarbonate 19 (*)     Anion Gap 14      Urea Nitrogen 36 (*)     Creatinine 2.56 (*)     eGFR 29 (*)     Calcium 9.0      Albumin 3.9      Alkaline Phosphatase 62      Total Protein 7.7      AST 12      Bilirubin, Total 0.2       ALT 10     TROPONIN I, HIGH SENSITIVITY - Abnormal    Troponin I, High Sensitivity 119 (*)     Narrative:     Less than 99th percentile of normal range cutoff-  Female and children under 18 years old <14 ng/L; Male <21 ng/L: Negative  Repeat testing should be performed if clinically indicated.     Female and children under 18 years old 14-50 ng/L; Male 21-50 ng/L:  Consistent with possible cardiac damage and possible increased clinical   risk. Serial measurements may help to assess extent of myocardial damage.     >50 ng/L: Consistent with cardiac damage, increased clinical risk and  myocardial infarction. Serial measurements may help assess extent of   myocardial damage.      NOTE: Children less than 1 year old may have higher baseline troponin   levels and results should be interpreted in conjunction with the overall   clinical context.     NOTE: Troponin I testing is performed using a different   testing methodology at [facility] than at other   [facility]. Direct result comparisons should only   be made within the same method.   POCT GLUCOSE - Abnormal    POCT Glucose 115 (*)    PROTIME-INR - Normal    Protime 11.6      INR 1.0     APTT - Normal    aPTT 38      Narrative:     The APTT is no longer used for monitoring Unfractionated Heparin Therapy. For monitoring Heparin Therapy, use the Heparin Assay.   POCT GLUCOSE METER     1927 -- sinus tachycardia rate 110.  Normal axis.  Normal intervals.  LVH, RBBB, nonspecific ST-T wave changes with no findings of STEMI. Unchanged compared to old EKG    Medical Decision Making  Pt upon arrival to the ED appeared to be exhibiting strokelike symptoms with elevated blood pressure and initial NIH of 4.  Discussed with pt the presenting complaints and clinically findings.  Reviewed with pt the epic chart and counseled the patient on strokelike symptoms and appropriate approach to management/treatments.  After assessment and evaluation IV line was  started, labs sent, imaging ordered, EKG reviewed, given IV labetalol, placed on cardiac monitor, and transfer to the radiology department for immediate CT scan.  Reviewed the CT scan with the on-call radiologist and no acute process was noted.  Patient's reevaluation continue exhibit NIH of 4.  At this time patient's blood pressure continue to be monitored and given labetalol as needed for steady improvement of his blood pressure down to just below 200 systolic.  At this time on-call neurology was contacted and case discussed with them regarding his presentation and findings and the recommendation was the patient did not meet criteria for tPA and recommendations were made regarding management of patient's blood pressure moving forward.  It was advised to have the patient admitted/transferred to facility of higher level of care with neurology coverage.  At this time patient was given aspirin in the ED for treatment as recommended and continue to monitor closely.  Patient's repeat neurological exam exhibited NIH score of 4 with no change.  On-call internal medicine [facility] contacted and case discussed with them.  After discussion is agreed patient required mission/transfer to their facility with neurology consultation.  Patient's final results were reviewed/discussed with him and patient was comfortable with plan of care and admission/transfer to [facility].  At this time patient is continue to improve and vital signs continue to be steady and patient transferred via ground unit to [facility].    Amount and/or Complexity of Data Reviewed  Independent Historian: friend  External Data Reviewed: labs, radiology, ECG and notes.  Labs: ordered. Decision-making details documented in ED Course.  Radiology: ordered. Decision-making details documented in ED Course.  ECG/medicine tests: ordered and independent interpretation performed. Decision-making details documented in ED Course.    Risk  Decision  regarding hospitalization.        Procedure  Procedures     Paxton Bowling MD  01/10/25 6426

## 2025-01-10 NOTE — CARE PLAN
The clinical goals for the shift include no decline in neuro status      Problem: General Stroke  Goal: Demonstrate improvement in neurological exam throughout the shift  Outcome: Progressing  Goal: No symptoms of aspiration throughout shift  Outcome: Progressing       Problem: General Stroke  Goal: Controlled blood glucose throughout shift  Outcome: Not Progressing

## 2025-01-10 NOTE — PROGRESS NOTES
Speech-Language Pathology    Speech-Language Pathology Clinical Swallow Evaluation    Patient Name: Iglesia Clarke  MRN: [id number]  : 1970  Today's Date: 01/10/25  Start Time: 1710  Stop Time: 1734  Time Calculation (min): 24 min      ASSESSMENT  Impressions:  Functional oral phase management when compensation via placement on strong/ right side  No obvious pharyngeal phase dysphagia.   Prognosis: Good    PLAN  Recommendations:  Is MBSS recommended? No; no pharyngeal dysphagia suspected.  Solid consistency: Regular (IDDSI level 7)  Liquid consistency: Thin (IDDSI 0)  Medication administration: Whole, in thin liquid, in puree  Compensatory swallow strategies:  - Upright positioning for all PO intake  - Tip head to the right when drinking liquids  - place food on right    Recommended frequency/duration:  Skilled SLP services recommended: No    SUBJECTIVE    PMHx relevant to rehab:   Iglesia Clarke is a 54 y.o. male with PMHx HTN, HFrEF (EF 35-40% on ), DM, CKD, NSTEMI, morbid obesity, anxiety, depression, noncompliance with medications and sleep apnea admitted as a transfer from [place] for CVA workup. Neurology consulted, will see patient in the morning. Patient's sole neurologic deficit at this point is left-sided facial weakness without sparing of the forehead.    Pt reports a variety of neurological changes because of a chemical exposure which resulted in changes in hearing, smell and taste. He reports occasions of hearing the sound of someone knocking on the door when he sleeps which results in him waking to check the door to see that nobody is there.     Chief complaint: Pt was admitted on 1/10/25 due to No chief complaint on file.  . He was found to have Bell's palsy.    Relevant imaging results:  Chest x ray 1/10/25  IMPRESSION:  Cardiomegaly with pulmonary venous congestion without overt edema.    General Visit Information:  SLP Received On: 01/10/25  Patient Class: Inpatient  Living Environment:  Home  Ordering Physician: Dr. Joaquin  Reason for Referral: concern for dysphagia  Prior to Session Communication: Bedside nurse    RN cleared pt to participate in session and reported no difficulty with eating.     Pt reported no difficulty with eating/ drinking. Increased oral control, less anterior loss of liquids than last night. He reports closing his eye with his hand periodically. He cannot close it by himself, but it sometimes stays closed after he closes it.   [employer] Diet: Regular/thin  Current Diet : regular, thin    Status at time of evaluation:  Pain Assessment  Pain Assessment: 0-10  0-10 (Numeric) Pain Score: 0 - No pain  Pain Type: Acute pain  Pain Location: Head  Response to Interventions: Resting quietly    Pt was alert, pleasant, cooperative, and attentive for session.  Orientation: Ox4  Ability to follow functional commands: WFL  Nutritional status: Appears well-nourished/no concerns    Respiratory status: Room air  Baseline Vocal Quality: Normal  Volitional Cough: Strong  Volitional Swallow: Within Functional Limits  Patient positioning: Upright in bed      OBJECTIVE  Clinical swallow evaluation completed and consisted of interview, oral motor assessment, and PO trials.  ORAL PHASE:  Oral mucosa were pink, moist, and free of obvious lesions. Lingual strength and ROM were wfl. Labial strength/ROM were left droop. Able to achieve closure, but asymmetry apparent. Labial seal was adequate. Mastication of regular solids was good. A/P transit and oral clearance were good.  PHARYNGEAL PHASE: Laryngeal elevation was visualized or palpated with all trials, however adequacy of hyolaryngeal elevation/excursion cannot be determined at bedside. No immediate or delayed s/sx aspiration/penetration were observed with any consistencies.    Was 3oz challenge administered: Yes; pt drank 3oz of thin liquid in one attempt, without breaking, with no overt s/sx aspiration/penetration  observed.      Treatment/Education:  Results and recommendations were relayed to: Patient  Education provided: Yes   Learner: Patient   Barriers to learning: None   Method of teaching: Verbal   Topic: role of ST, results of assessment, and recommended safe swallow strategies   Outcome of teaching: Pt/family demonstrated good understanding  Treatment provided: No

## 2025-01-10 NOTE — CARE PLAN
Problem: Fall/Injury  Goal: Not fall by end of shift  Outcome: Progressing     Problem: General Stroke  Goal: Maintain BP within ordered limits throughout shift  Outcome: Progressing  Goal: No symptoms of aspiration throughout shift  Outcome: Progressing   The patient's goals for the shift include  decreased facial droop    The clinical goals for the shift include patient will have have no new neurological deficits during shift

## 2025-01-10 NOTE — HOSPITAL COURSE
Iglesia Clarke is a 54 y.o. male with PMHx HTN, HFrEF (EF 35-40% on 7/24), DM, CKD, NSTEMI, morbid obesity, anxiety, depression, noncompliance with medications and sleep apnea admitted as a transfer from [place] for CVA workup. Neurology consulted, will see patient in the morning.     Upon arrival at [place] ED, patient had an initial NIH stroke scale of 4. Patient was given labetalol for hypertension and CT scan demonstrated no acute process. Patient was given another dose of labetalol to keep blood pressure around roughly 200 systolic. On-call neurology was contacted and recommendation was that patient did not meet criteria for tPA. Patient was given aspirin and had repeated neurological exam demonstrating NIH score of 4. Patient was then transferred to [facility] for CVA workup.    While admitted at [facility], neurology was consulted. Isolated CN VII deficit without sparing was noted. No other neurological deficits. Head MRI showed no acute process, patient did not tolerate angio. Suspected isolated Bell's palsy, began on valacyclovir and prednisone for 7 day course. Recently left [facility] (12/26) for acute HF exacerbation, continues to have significant conversational dyspnea and hypertension. Patient not concerned at this time, but would like surgery for his hernia, was informed now and previously that he is not cleared medically without improvement of HF and HTN. Lasix was ordered and IV labetalol was continued. Patient also has bilateral supraclavicular swelling (patient notes reduced since lasix from last admit), C1Q, C4 and C1 esterase inhibitor collected. Patient wishes to leave [facility].

## 2025-01-10 NOTE — PROGRESS NOTES
Physical Therapy                 Therapy Communication Note    Patient Name: Iglesia Clarke  MRN: [id number]  Department: [address]  Room: [address] [place] [address]-[place]  Today's Date: 1/10/2025     Discipline: Physical Therapy    PT Missed Visit: Yes     Missed Visit Reason: Missed Visit Reason: Cancel (Communicated with OT (whom completed OT screen) who reports pt was observed ambulating IND around the room completing ADL's.  Pt verbalized no physical deficits other than facial droop.  Per OT, no skilled therapy needs indicated during acute LOS and upon discharge.)    Missed Time: Cancel    Comment:  Pt IND with ADL's and functional mobility.  No concerns with safe home going.  Pt reports adequate help from neighbors at home.

## 2025-01-10 NOTE — PROGRESS NOTES
Subjective   Subjective:   History Of Present Illness:  Iglesia Clarke is a 54 y.o. male was seen and evaluated at bedside today. Transferred overnight from [place] for CVA workup. Patient is at no acute distress. Symptoms of left sided facial droop and weakness persistent. Denies weakness or sensory loss in upper and lower extremities. Mentioned sick contact with EBV, tick bite in the spring with no associated rash. Endorses itchy rash on left side of neck with minimal pain. Mentioned swelling of supraclavicular region that has been present for some time, improved after last hospital discharge. Endorses baseline significant shortness of breath which has improved since last discharge. No lightheadedness or headache, no nausea / vomiting.         Objective   Objective:     BP (!) 198/107 Comment: nurse aware  Pulse 101   Temp 36.5 °C (97.7 °F) (Temporal)   Resp 18   Ht 1.829 m (6')   Wt 124 kg (274 lb 4.8 oz)   SpO2 92%   BMI 37.20 kg/m²     Physical Exam  HENT:     Some bilat redness on his upper neck. Moderate bilat swelling of supraclavicular region.  General Appearance:     No acute distress, sitting in bed  Cardiovascular:      Regular rhythm, normal rate.   Pulmonary:      Significant Respiratory effort on RA, significant conversational dyspnea. Normal breath sounds  Abdominal:      Nontender, nondistended, no masses on palpation  Neurological:      Cranial Nerves: Left-sided CN VII nerve deficit w/ facial asymmetry and weakness not sparing eyebrows. CN II-VI and VII-XII intact     No difficulty ambulating or cerebellar deficits.     Mental Status: He is alert and oriented  Musculoskeletal:     Trace bilat lower extremity edema present   Assessment & Plan:     Iglesia Clarke is a 54 y.o. male with PMHx HTN, HFrEF (EF 35-40% on 7/24), DM, CKD, NSTEMI, morbid obesity, anxiety, depression, noncompliance with medications and sleep apnea admitted as a transfer from [place] for CVA workup. Neurology consulted,  will see patient in the morning. Patient's sole neurologic deficit at this point is left-sided facial weakness without sparing of the forehead.      ACUTE ISSUES:  #Bihsop Palsy, left-sided  #CVA - ruled out:  :: Isolated Left CN VII deficit on physical exam with no other neurological findings.  :: NIH score 3  :: BP consistently elevated SBPs >170   :: CT Head non-contrast; no acute process  :: MRI Head no acute process  :: MRA head/Neck; pending  :: lyme serology pending  PLAN  - Neurology consulted, recommends Prednisone 60 mg daily for 7 days, valacyclovir 1000 mg q12 for 7 days (adjusted for eGFR)      ##HFrEF, EF 35-40% (7/30/24):   #HTN  :: Patient recently admitted (12/26) for acute on chronic heart failure 2/2 medication non-compliance  left AMA  :: Significant conversational dyspnea, better after lasix from last admit  :: TTE (1/10) EF 35% , mild to moderate MVP , moderately elevated pulmonary artery pressure  PLAN:  - IV labetalol PRN for SBP >220  - IV Lasix 40 mg BID  - Restart home amolidipine 10 mg and metoprolol succinate now that ischemic stroke ruled out   - Hold home torsemide 20 mg daily  - consider cardiology consult tomorrow AM for heart failure     #Supraclavicular swelling  #Concern for angioedema  :: swelling of bilateral supraclavicular regions  :: Patient says better than previous (assumed after lasix on last admit)  Plan:  - Watch for improvement with Lasix use  - C1q, C4, and C1 esterase labs pending    CHRONIC ISSUES:  #Anxiety, Depression: home duloxetine 30 mg daily  #T2DM: SSI #2, repeat A1c pending   #CKD: Creatinine 2.56 in ED, ~2.1 at baseline    Fluid: Replete PRN  Electrolytes: Replete PRN  Nutrition: Adult regular diet  GI PPx: not indicated  DVT/PE PPx: lovenox subq  IV Lines: PIV  O2: PRN    Disposition: Would be discharged to home once medically cleared. Estimated length of stay >48hrs    Kan Tejada MS3  Internal Medicine Student

## 2025-01-10 NOTE — H&P
"  HPI   HPI: Iglesia Clarke is a 54 y.o. male with PMHx HTN, HFrEF (EF 35-40% on 7/24), DM, CKD, NSTEMI, morbid obesity, anxiety, depression, noncompliance with medications and sleep apnea admitted as a transfer from [facility] for CVA workup.    Patient states he was lifting weights around 1 AM Thursday 1/9 when he noticed that he was unable to blink his left eye. The left side of his face felt heavy and his symptoms appeared at once \"like a light switch.\" He was seen by his neighbor later who recommended that he goes to the ED. Patient states that while he was at the ED, he experienced some difficulty articulating his thoughts. He could think of what to say but then could not appropriately speak his thoughts. Currently, those symptoms have resolved. He continues to have left-sided facial weakness but denies loss of sensation of the face or elsewhere on the body. He also denies weakness of his upper and lower extremities. No recent sick exposures or tick bites, although he notes he was bitten by a tick in the spring. Denies chest pain, fevers, chills, nausea, abdominal pain. Endorses SOB and constipation, but states that they are at his baseline level. States he has taken his medication as prescribed since being discharged on 12/27/24. Patient does not currently drink alcohol or use tobacco. Endorses using THC for abdominal discomfort. [place] [facility] ED course summarized below.    Upon arrival at the ED, patient had an initial NIH stroke scale of 4. Patient was given labetalol for hypertension and CT scan demonstrated no acute process. Patient was given another dose of labetalol to keep blood pressure around roughly 200 systolic. On-call neurology was contacted and recommendation was that patient did not meet criteria for tPA. Patient was given aspirin and had repeated neurological exam demonstrating NIH score of 4. Patient was then transferred to [facility].    [facility] ED course:     Vitals: Temp 92-98, /106-228/132, " HR , RR 13-34, Spo2 92-98% on RA    Labs:   -CBC - WBC 8.3, RBC 4.91, Hgb 14.8, Hct46.7, MCV 95, Platelet 427, PTT 38, INR 1.0  -CMP - Glucose 125, Na 133, K 4.0, Cl 104, HCO3 19, BUN 36, Cr 2.56 (baseline ~2.1) Ca 9.0, Alk Phos 62, ALT 10, AST 12  -Troponin: 119    Imaging:   - CT head: no evidence of acute cortical infarct or intracranial hemorrhage    Interventions: Labetalol x 2, ASA    ROS: 12 points review of system is negative except as stated in the HPI above.   Past Medical History     Past Medical History:   Diagnosis Date    Acute on chronic combined systolic (congestive) and diastolic (congestive) heart failure 12/26/2024    Acute respiratory failure with hypoxia (Multi) 12/26/2024    Anxiety and depression 12/26/2024    Kidney stones     Non compliance w medication regimen 12/26/2024      Surgical History   No past surgical history on file.  Family History   No family history on file.  Social History     Social History     Socioeconomic History    Marital status: Single     Spouse name: Not on file    Number of children: Not on file    Years of education: Not on file    Highest education level: Not on file   Occupational History    Not on file   Tobacco Use    Smoking status: Never    Smokeless tobacco: Never   Vaping Use    Vaping status: Never Used   Substance and Sexual Activity    Alcohol use: Not Currently    Drug use: Never    Sexual activity: Not on file   Other Topics Concern    Not on file   Social History Narrative    Not on file     Social Drivers of Health     Financial Resource Strain: Low Risk  (1/10/2025)    Overall Financial Resource Strain (CARDIA)     Difficulty of Paying Living Expenses: Not hard at all   Food Insecurity: No Food Insecurity (1/10/2025)    Hunger Vital Sign     Worried About Running Out of Food in the Last Year: Never true     Ran Out of Food in the Last Year: Never true   Transportation Needs: No Transportation Needs (1/10/2025)    PRAPARE - Transportation      Lack of Transportation (Medical): No     Lack of Transportation (Non-Medical): No   Physical Activity: Not on file   Stress: Not on file   Social Connections: Not on file   Intimate Partner Violence: Not At Risk (1/10/2025)    Humiliation, Afraid, Rape, and Kick questionnaire     Fear of Current or Ex-Partner: No     Emotionally Abused: No     Physically Abused: No     Sexually Abused: No   Housing Stability: Low Risk  (1/10/2025)    Housing Stability Vital Sign     Unable to Pay for Housing in the Last Year: No     Number of Times Moved in the Last Year: 1     Homeless in the Last Year: No       Tobacco Use: Low Risk  (1/9/2025)    Patient History     Smoking Tobacco Use: Never     Smokeless Tobacco Use: Never     Passive Exposure: Not on file        Social History     Substance and Sexual Activity   Alcohol Use Not Currently      Allergies   No Known Allergies   Meds    Scheduled medications  [Held by provider] amLODIPine, 10 mg, oral, Daily  aspirin, 81 mg, oral, Daily  atorvastatin, 40 mg, oral, Nightly  clopidogrel, 75 mg, oral, Daily  DULoxetine, 30 mg, oral, Daily  enoxaparin, 40 mg, subcutaneous, q24h  insulin lispro, 0-10 Units, subcutaneous, TID AC  [Held by provider] metoprolol succinate XL, 50 mg, oral, Daily  sennosides-docusate sodium, 2 tablet, oral, BID  torsemide, 20 mg, oral, Daily      Continuous medications     PRN medications  PRN medications: acetaminophen **OR** acetaminophen, dextrose, dextrose, glucagon, glucagon, hydrALAZINE **FOLLOWED BY** [START ON 1/12/2025] hydrALAZINE, labetaloL, oxygen   Objective     Vitals  Visit Vitals  BP (!) 198/130 (BP Location: Left arm, Patient Position: Lying)   Pulse 96   Temp 36.2 °C (97.2 °F) (Temporal)   Resp 22   Ht 1.829 m (6')   Wt 124 kg (274 lb 4.8 oz)   SpO2 94%   BMI 37.20 kg/m²   Smoking Status Never   BSA 2.51 m²        Physical Examination:  Physical Exam  Constitutional:       General: He is not in acute distress.     Appearance: He is obese.  "  Cardiovascular:      Rate and Rhythm: Regular rhythm. Tachycardia present.      Heart sounds: Normal heart sounds. No murmur heard.  Pulmonary:      Breath sounds: Normal breath sounds. No wheezing.      Comments: Increased respiratory effort  Abdominal:      General: There is no distension.      Palpations: Abdomen is soft.      Tenderness: There is no abdominal tenderness.   Musculoskeletal:      Comments: Trace bilateral LLE edema   Neurological:      Mental Status: He is alert and oriented to person, place, and time.      Cranial Nerves: Cranial nerve deficit and facial asymmetry present.      Sensory: No sensory deficit.      Motor: Weakness present.      Comments: Left-sided facial weakness without forehead sparing          I/Os  No intake or output data in the 24 hours ending 01/10/25 0614    Labs:   Results from last 72 hours   Lab Units 01/09/25  1937   SODIUM mmol/L 133*   POTASSIUM mmol/L 4.0   CHLORIDE mmol/L 104   CO2 mmol/L 19*   BUN mg/dL 36*   CREATININE mg/dL 2.56*   GLUCOSE mg/dL 125*   CALCIUM mg/dL 9.0   ANION GAP mmol/L 14   EGFR mL/min/1.73m*2 29*      Results from last 72 hours   Lab Units 01/09/25  1937   WBC AUTO x10*3/uL 8.3   HEMOGLOBIN g/dL 14.8   HEMATOCRIT % 46.7   PLATELETS AUTO x10*3/uL 427   NEUTROS PCT AUTO % 65.7   LYMPHS PCT AUTO % 20.7   MONOS PCT AUTO % 10.8   EOS PCT AUTO % 1.6      Lab Results   Component Value Date    CALCIUM 9.0 01/09/2025    PHOS 3.7 07/21/2024      No results found for: \"CRP\"     Micro/ID:   No results found for the last 90 days.                   No lab exists for component: \"AGALPCRNB\"     Lab Results   Component Value Date    URINECULTURE No growth 03/15/2024    BLOODCULT No growth at 4 days -  FINAL REPORT 07/19/2024    BLOODCULT No growth at 4 days -  FINAL REPORT 07/19/2024     Images    CT brain attack head wo IV contrast    Result Date: 1/9/2025  No evidence of acute cortical infarct or intracranial hemorrhage.       MACRO: Shlomo Stallings " discussed the significance and urgency of this critical finding by epic secure chat with  DANYELL DIOR on 1/9/2025 at 7:54 pm.  (**-RCF-**) Findings:  See findings.     Signed by: Shlomo Stallings 1/9/2025 7:55 PM Dictation workstation:   [id number]    XR chest 1 view    Result Date: 12/26/2024  1.  Similar radiographic appearance of the test allowing for differences in patient position and technique.       MACRO: None   Signed by: Daniel Mendiola 12/26/2024 12:56 PM Dictation workstation:   [id number]    Assessment and Plan    Iglesia Clarke is a 54 y.o. male with PMHx HTN, HFrEF (EF 35-40% on 7/24), DM, CKD, NSTEMI, morbid obesity, anxiety, depression, noncompliance with medications and sleep apnea admitted as a transfer from [place] for CVA workup. Neurology consulted, will see patient in the morning. Patient's sole neurologic deficit at this point is left-sided facial weakness without sparing of the forehead.     Acute Medical Issues   #CVA vs. Bell's Palsy  -NIH score at [place] ED: 4  -BP at [place] ED: 228/130  -Last know normal: 12:30 AM, 1/9/25  -Stroke team at [facility] contacted, not TPA candidate  -CT Head non-contrast; no acute process  -EKG; sinus tachycardia  PLAN  -MRI brain; pending  -MRA head/neck; pending  -CT Head non-contrast in 72-96hrs 2/2 inability to perform MRI (if unable to perform MRI)  -TTE; pending  -Neuro consulted  -Neuro checks q4h  -Telemetry  -Start Clopidogrel 75mg PO qd  -Start ASA 81mg PO every day  -Fasted Lipid panel; pending  -Atorvastatin 40mg qHS  -HbA1c; pending  -PT/OT consulted; pending  -NPO  -SLP; pending  -Maintain blood pressure <200 systolic for 24 hours  -Labetalol 10 mg PRN systolic > 220  -Hydralazine 10 mg PRN systolic > 200    Chronic Medical Issues   #HFrEF, EF 35-40% (7/30/24): PTA torsemide 20 mg daily  #Anxiety, Depression: PTA duloxetine 30 mg daily  #T2DM: SSI  #HTN: Hold PTA amolidipine 10 mg, hold PTA metoprolol succinate  #CKD: Creatinine 2.56 in ED, ~2.1 at  baseline    F: PO intake & IVF PRN   E: Replete as needed   N: Consistent carb diet  GI ppx: None  DVT ppx: Lovenox Subq  Antibiotics: None  Tubes/Lines/Drains: pIV    Code Status: Full Code   Emergency Contact: Extended Emergency Contact Information  Primary Emergency Contact: moira Vigil  Mobile Phone: [phone]  Relation: Friend  Preferred language: English   needed? No     Disposition: 54 y.o.male admitted for suspected CVA workup. Estimated length of stay greater than 48 hrs.     Wayne Quezada MD  PGY-1 Transitional Year

## 2025-01-10 NOTE — ED TRIAGE NOTES
Pt ambulatory to ED with c/o left arm weakness and issues with keeping his left eye open. Pt states symptoms started around 0130 this morning when he had difficulty drinking water. Pt alert and oriented, NIH 4 & Bgl 115 on arrival.

## 2025-01-10 NOTE — CONSULTS
Date of Service: 01/10/25  Patient: Iglesia Clarke  MRN: [id number]    History Of Present Illness  Iglesia Clarke is a 54 y.o. male presenting with left-sided facial weakness.  Past medical history notable for hypertension, HFrEF (EF 35 to 40%), diabetes type 2, CKD, history of NSTEMI, morbid obesity, sleep apnea.    Last known well: 1 AM on 1/9  Had stroke symptoms resolved at time of presentation: No    Around 1 AM on 1/9 the patient noticed that he was having difficulty closing his left eye lid and the left side of his face was droopy while lifting weights.  He reports symptoms started acutely.  On presentation to the ED he reported some word finding difficulties.  This improved but he continued to have left-sided facial weakness.      NIH stroke scale on arrival to [facility] was reportedly 4.  He was given labetalol for hypertension.  CT head did not demonstrate any acute intracranial abnormalities.    He denies any numbness in the face.  No weakness in his upper or lower extremities no sensory changes in his upper or lower extremities. He has a 20 year history of intermittent diplopia which reportedly started after accidentally inhaling chemicals. No change in vision with onset of facial weakness.    No alcohol use or tobacco use.  He does use THC for abdominal discomfort. No recent illness, No history of Lyme disease. He reports a tick bite over the summer but no rash or symptoms following. No rash or vesicles on the face.     Review of Systems:  The systems were reviewed with pertinent positives and negatives documented in the HPI.      Past Medical & Surgical History  Past Medical History:   Diagnosis Date    Acute on chronic combined systolic (congestive) and diastolic (congestive) heart failure 12/26/2024    Acute respiratory failure with hypoxia (Multi) 12/26/2024    Anxiety and depression 12/26/2024    Kidney stones     Non compliance w medication regimen 12/26/2024     No past surgical history on  file.    Social History:   Social History     Tobacco Use    Smoking status: Never    Smokeless tobacco: Never   Substance Use Topics    Alcohol use: Not Currently     Family History:   No pertinent family history to the chief complaint     Medications:     Current Facility-Administered Medications:     acetaminophen (Tylenol) tablet 650 mg, 650 mg, oral, q4h PRN **OR** acetaminophen (Tylenol) oral liquid 650 mg, 650 mg, nasogastric tube, q4h PRN, Serena Good DO    [Held by provider] amLODIPine (Norvasc) tablet 10 mg, 10 mg, oral, Daily, Serena Good DO    aspirin chewable tablet 81 mg, 81 mg, oral, Daily, Serena Good DO    atorvastatin (Lipitor) tablet 80 mg, 80 mg, oral, Nightly, Fredrick Joaquin DO    clopidogrel (Plavix) tablet 75 mg, 75 mg, oral, Daily, Serena Good DO    dextrose 50 % injection 12.5 g, 12.5 g, intravenous, q15 min PRN, Serena Good DO    dextrose 50 % injection 25 g, 25 g, intravenous, q15 min PRN, Serena Good DO    DULoxetine (Cymbalta) DR capsule 30 mg, 30 mg, oral, Daily, Serena Good DO    enoxaparin (Lovenox) syringe 40 mg, 40 mg, subcutaneous, q24h, Serena Good DO    glucagon (Glucagen) injection 1 mg, 1 mg, intramuscular, q15 min PRN, Serena Good DO    glucagon (Glucagen) injection 1 mg, 1 mg, intramuscular, q15 min PRN, Serena Good DO    hydrALAZINE (Apresoline) injection 10 mg, 10 mg, intravenous, q20 min PRN **FOLLOWED BY** [START ON 1/12/2025] hydrALAZINE (Apresoline) tablet 25 mg, 25 mg, oral, q6h PRN, Serena Good DO    insulin lispro injection 0-10 Units, 0-10 Units, subcutaneous, TID AC, Serena Good DO    labetaloL (Normodyne,Trandate) injection 10 mg, 10 mg, intravenous, q10 min PRN, Serena Good DO    [Held by provider] metoprolol succinate XL (Toprol-XL) 24 hr tablet 50 mg, 50 mg, oral, Daily, Serena Good DO    oxygen (O2) therapy, , inhalation, Continuous PRN - O2/gases, Serena Good DO    sennosides-docusate sodium (Lena-Colace) 8.6-50 mg per tablet 2 tablet, 2 tablet, oral, BID,  Serena Good DO    torsemide (Demadex) tablet 20 mg, 20 mg, oral, Daily, Serena Good DO     General Physical Exam:  BP (!) 198/130 (BP Location: Left arm, Patient Position: Lying)   Pulse 96   Temp 36.2 °C (97.2 °F) (Temporal)   Resp 22   Ht 1.829 m (6')   Wt 124 kg (274 lb 4.8 oz)   SpO2 94%   BMI 37.20 kg/m²      He is dyspneic frequently while talking     Neurological Exam:   Mental status reveals him to be alert and oriented to person, place, and date. Speech is intact to expression, comprehension, repetition, naming.     Cranial nerves:  CN 2   Visual fields full to confrontation.   CN 3, 4, 6   Pupils round, 4 mm in diameter, equally reactive to light. Lids symmetric; no ptosis. EOMs normal alignment, full range.   No nystagmus.   CN 5   Facial sensation intact bilaterally.   CN 7   Left upper and lower facial weakness. Left facial droop. Decreased wrinkles across left forehead. Unable to bury lashes on left with eye closure  CN 8   Hearing intact to conversation.   CN 9   Palate elevates symmetrically.   CN 11   Normal strength of shoulder shrug and neck turning.   CN 12   Tongue midline, with normal bulk     Motor:    R L   5 5 Shoulder abduction  5 5 Elbow flexion  5 5 Elbow extension  5 5 Finger abduction  5 5 Hip flexion  5 5 Knee flexion  5 5 Knee extension  5 5 Ankle dorsiflexion  5 5 Ankle plantarflexion    No pronator drift or orbiting     Reflexes                         R     L  Triceps          1      1  Biceps           1      1  Brachiorad    1      1  Patellar         1      1   Achilles         0      0    Babinski: toes downgoing to plantar stimulation. No clonus or other pathologic reflexes present.      Sensory:   In both upper and lower extremities, sensation was intact to light touch     Coordination:  In both upper extremities, finger-nose-finger was intact without dysmetria or overshoot.   In both lower extremities, heel-to-shin was intact.      Gait:  Station was stable with a  normal base. Gait was stable with a normal arm swing and speed. No ataxia, shuffling, steppage or waddling was present. No Romberg sign was present.    NIHSS:  1a  Level of consciousness: 0=alert; keenly responsive   1b. LOC questions:  0=Performs both tasks correctly   1c. LOC commands: 0=Performs both tasks correctly   2.  Best Gaze: 0=normal   3. Visual: 0=No visual loss   4. Facial Palsy: 3=Complete paralysis of one or both sides (absence of facial movement in the upper and lower face)   5a. Motor left arm: 0=No drift, limb holds 90 (or 45) degrees for full 10 seconds   5b.  Motor right arm: 0=No drift, limb holds 90 (or 45) degrees for full 10 seconds   6a. motor left le=No drift, limb holds 90 (or 45) degrees for full 10 seconds   6b  Motor right le=No drift, limb holds 90 (or 45) degrees for full 10 seconds   7. Limb Ataxia: 0=Absent   8.  Sensory: 0=Normal; no sensory loss   9. Best Language:  0=No aphasia, normal   10. Dysarthria: 0=Normal   11. Extinction and Inattention: 0=No abnormality          Total:   3     Relevant Results  Results for orders placed or performed during the hospital encounter of 25 (from the past 24 hours)   POCT GLUCOSE   Result Value Ref Range    POCT Glucose 115 (H) 74 - 99 mg/dL   CBC and Auto Differential   Result Value Ref Range    WBC 8.3 4.4 - 11.3 x10*3/uL    nRBC 0.0 0.0 - 0.0 /100 WBCs    RBC 4.91 4.50 - 5.90 x10*6/uL    Hemoglobin 14.8 13.5 - 17.5 g/dL    Hematocrit 46.7 41.0 - 52.0 %    MCV 95 80 - 100 fL    MCH 30.1 26.0 - 34.0 pg    MCHC 31.7 (L) 32.0 - 36.0 g/dL    RDW 13.8 11.5 - 14.5 %    Platelets 427 150 - 450 x10*3/uL    Neutrophils % 65.7 40.0 - 80.0 %    Immature Granulocytes %, Automated 0.6 0.0 - 0.9 %    Lymphocytes % 20.7 13.0 - 44.0 %    Monocytes % 10.8 2.0 - 10.0 %    Eosinophils % 1.6 0.0 - 6.0 %    Basophils % 0.6 0.0 - 2.0 %    Neutrophils Absolute 5.44 1.20 - 7.70 x10*3/uL    Immature Granulocytes Absolute, Automated 0.05 0.00 - 0.70  x10*3/uL    Lymphocytes Absolute 1.71 1.20 - 4.80 x10*3/uL    Monocytes Absolute 0.89 0.10 - 1.00 x10*3/uL    Eosinophils Absolute 0.13 0.00 - 0.70 x10*3/uL    Basophils Absolute 0.05 0.00 - 0.10 x10*3/uL   Comprehensive metabolic panel   Result Value Ref Range    Glucose 125 (H) 74 - 99 mg/dL    Sodium 133 (L) 136 - 145 mmol/L    Potassium 4.0 3.5 - 5.3 mmol/L    Chloride 104 98 - 107 mmol/L    Bicarbonate 19 (L) 21 - 32 mmol/L    Anion Gap 14 10 - 20 mmol/L    Urea Nitrogen 36 (H) 6 - 23 mg/dL    Creatinine 2.56 (H) 0.50 - 1.30 mg/dL    eGFR 29 (L) >60 mL/min/1.73m*2    Calcium 9.0 8.6 - 10.3 mg/dL    Albumin 3.9 3.4 - 5.0 g/dL    Alkaline Phosphatase 62 33 - 120 U/L    Total Protein 7.7 6.4 - 8.2 g/dL    AST 12 9 - 39 U/L    Bilirubin, Total 0.2 0.0 - 1.2 mg/dL    ALT 10 10 - 52 U/L   Troponin I, High Sensitivity   Result Value Ref Range    Troponin I, High Sensitivity 119 (HH) 0 - 20 ng/L   Protime-INR   Result Value Ref Range    Protime 11.6 9.8 - 12.8 seconds    INR 1.0 0.9 - 1.1   APTT   Result Value Ref Range    aPTT 38 27 - 38 seconds      IMAGING  TTE  No echocardiogram results found for the past 14 days     MR brain wo IV contrast  No MRI head results found for the past 14 days     CT head wo IV contrast  CT brain attack head wo IV contrast    Result Date: 1/9/2025  No evidence of acute cortical infarct or intracranial hemorrhage.       MACRO: Shlomo Stallings discussed the significance and urgency of this critical finding by epic secure chat with  DANYELL DIOR on 1/9/2025 at 7:54 pm.  (**-RCF-**) Findings:  See findings.     Signed by: Shlomo Stallings 1/9/2025 7:55 PM Dictation workstation:   [id number]      IV Thrombolysis    IV Thrombolysis Given: No; Thrombolysis contraindication reason: Working diagnosis is NOT a suspected ischemic stroke    Impression:  Iglesia Cornellk is a 54 y.o. who presents with L upper and lower facial weakness. On exam he is unable to close the left eye. There is decreased wrinkles on  the left forehead and left facial droop. No sensory abnormalities. No weakness or sensory changes in the extremities. Neurological exam most consistent with Bell's palsy, House-Brackmann V. MRI brain without evidence of acute infarct.    Plan:    #L Bell's Palsy   -Start prednisone 60mg daily for 7 days.   -Start valacyclovir 1000mg every 12 hours for 7 days (adjusted for eGRF)  - Lyme serology ordered    Follow up with neurology in 3-4 months.    Reviewed and approved by JOSELYN HORN on 1/10/25 at 7:25 AM.    I personally spent 80 minutes on the day of the visit completing the review of the medical record and outside records, obtaining history and performing an appropriate physical exam, patient care, counseling and education, placing orders, independently reviewing results, communicating with the patient and other providers, coordinating care and performing appropriate clinical documentation.

## 2025-01-10 NOTE — PROGRESS NOTES
Occupational Therapy                 Therapy Communication Note    Patient Name: Iglesia Clarke  MRN: [id number]  Department: [address]  Room: [address] [place] [address]-A  Today's Date: 1/10/2025     Discipline: Occupational Therapy        Missed Visit Reason:  Screen completed- Pt observed independently ambulating around room completing ADLs with no deficits upon arrival to evaluation. Pt verbalizing no physical deficits other than facial droop. Pt expresses having adequate help at home from neighbors and agrees to POC with no further skilled therapy needs upon discharge.    Missed Time: Cancel

## 2025-01-11 ENCOUNTER — APPOINTMENT (OUTPATIENT)
Dept: RADIOLOGY | Facility: HOSPITAL | Age: 55
End: 2025-01-11
Payer: MEDICARE

## 2025-01-11 VITALS
RESPIRATION RATE: 16 BRPM | HEART RATE: 77 BPM | DIASTOLIC BLOOD PRESSURE: 80 MMHG | HEIGHT: 72 IN | BODY MASS INDEX: 36.28 KG/M2 | TEMPERATURE: 97.5 F | SYSTOLIC BLOOD PRESSURE: 130 MMHG | WEIGHT: 267.86 LBS | OXYGEN SATURATION: 94 %

## 2025-01-11 PROBLEM — I63.9 ACUTE CEREBROVASCULAR ACCIDENT (MULTI): Status: RESOLVED | Noted: 2025-01-10 | Resolved: 2025-01-11

## 2025-01-11 PROBLEM — G51.0 BELL PALSY: Status: ACTIVE | Noted: 2025-01-11

## 2025-01-11 LAB
ALBUMIN SERPL BCP-MCNC: 3.9 G/DL (ref 3.4–5)
ALP SERPL-CCNC: 66 U/L (ref 33–120)
ALT SERPL W P-5'-P-CCNC: 11 U/L (ref 10–52)
ANION GAP SERPL CALC-SCNC: 16 MMOL/L (ref 10–20)
AST SERPL W P-5'-P-CCNC: 16 U/L (ref 9–39)
BILIRUB SERPL-MCNC: 0.3 MG/DL (ref 0–1.2)
BNP SERPL-MCNC: 904 PG/ML (ref 0–99)
BUN SERPL-MCNC: 38 MG/DL (ref 6–23)
CALCIUM SERPL-MCNC: 9 MG/DL (ref 8.6–10.3)
CHLORIDE SERPL-SCNC: 104 MMOL/L (ref 98–107)
CO2 SERPL-SCNC: 19 MMOL/L (ref 21–32)
CREAT SERPL-MCNC: 2.57 MG/DL (ref 0.5–1.3)
EGFRCR SERPLBLD CKD-EPI 2021: 29 ML/MIN/1.73M*2
ERYTHROCYTE [DISTWIDTH] IN BLOOD BY AUTOMATED COUNT: 13.8 % (ref 11.5–14.5)
GLUCOSE BLD MANUAL STRIP-MCNC: 126 MG/DL (ref 74–99)
GLUCOSE BLD MANUAL STRIP-MCNC: 154 MG/DL (ref 74–99)
GLUCOSE BLD MANUAL STRIP-MCNC: 165 MG/DL (ref 74–99)
GLUCOSE SERPL-MCNC: 126 MG/DL (ref 74–99)
HCT VFR BLD AUTO: 46.3 % (ref 41–52)
HGB BLD-MCNC: 15.4 G/DL (ref 13.5–17.5)
LEGIONELLA AG UR QL: NEGATIVE
MAGNESIUM SERPL-MCNC: 2.06 MG/DL (ref 1.6–2.4)
MCH RBC QN AUTO: 30 PG (ref 26–34)
MCHC RBC AUTO-ENTMCNC: 33.3 G/DL (ref 32–36)
MCV RBC AUTO: 90 FL (ref 80–100)
NRBC BLD-RTO: 0 /100 WBCS (ref 0–0)
PHOSPHATE SERPL-MCNC: 3 MG/DL (ref 2.5–4.9)
PLATELET # BLD AUTO: 431 X10*3/UL (ref 150–450)
POTASSIUM SERPL-SCNC: 3.8 MMOL/L (ref 3.5–5.3)
PROT SERPL-MCNC: 7.8 G/DL (ref 6.4–8.2)
RBC # BLD AUTO: 5.14 X10*6/UL (ref 4.5–5.9)
S PNEUM AG UR QL: NEGATIVE
SODIUM SERPL-SCNC: 135 MMOL/L (ref 136–145)
WBC # BLD AUTO: 9.7 X10*3/UL (ref 4.4–11.3)

## 2025-01-11 PROCEDURE — 74176 CT ABD & PELVIS W/O CONTRAST: CPT | Performed by: RADIOLOGY

## 2025-01-11 PROCEDURE — 84100 ASSAY OF PHOSPHORUS: CPT

## 2025-01-11 PROCEDURE — 74176 CT ABD & PELVIS W/O CONTRAST: CPT

## 2025-01-11 PROCEDURE — 96372 THER/PROPH/DIAG INJ SC/IM: CPT

## 2025-01-11 PROCEDURE — 2500000002 HC RX 250 W HCPCS SELF ADMINISTERED DRUGS (ALT 637 FOR MEDICARE OP, ALT 636 FOR OP/ED)

## 2025-01-11 PROCEDURE — 2500000004 HC RX 250 GENERAL PHARMACY W/ HCPCS (ALT 636 FOR OP/ED)

## 2025-01-11 PROCEDURE — 84145 PROCALCITONIN (PCT): CPT | Mod: GEALAB

## 2025-01-11 PROCEDURE — 99232 SBSQ HOSP IP/OBS MODERATE 35: CPT | Performed by: STUDENT IN AN ORGANIZED HEALTH CARE EDUCATION/TRAINING PROGRAM

## 2025-01-11 PROCEDURE — 84460 ALANINE AMINO (ALT) (SGPT): CPT

## 2025-01-11 PROCEDURE — 94760 N-INVAS EAR/PLS OXIMETRY 1: CPT

## 2025-01-11 PROCEDURE — 2500000005 HC RX 250 GENERAL PHARMACY W/O HCPCS

## 2025-01-11 PROCEDURE — 82947 ASSAY GLUCOSE BLOOD QUANT: CPT

## 2025-01-11 PROCEDURE — 99239 HOSP IP/OBS DSCHRG MGMT >30: CPT

## 2025-01-11 PROCEDURE — 83735 ASSAY OF MAGNESIUM: CPT

## 2025-01-11 PROCEDURE — 84075 ASSAY ALKALINE PHOSPHATASE: CPT

## 2025-01-11 PROCEDURE — 36415 COLL VENOUS BLD VENIPUNCTURE: CPT

## 2025-01-11 PROCEDURE — 1100000001 HC PRIVATE ROOM DAILY

## 2025-01-11 PROCEDURE — 2500000001 HC RX 250 WO HCPCS SELF ADMINISTERED DRUGS (ALT 637 FOR MEDICARE OP)

## 2025-01-11 PROCEDURE — 99222 1ST HOSP IP/OBS MODERATE 55: CPT | Performed by: NURSE PRACTITIONER

## 2025-01-11 PROCEDURE — 83880 ASSAY OF NATRIURETIC PEPTIDE: CPT | Performed by: NURSE PRACTITIONER

## 2025-01-11 PROCEDURE — 85027 COMPLETE CBC AUTOMATED: CPT

## 2025-01-11 RX ORDER — TORSEMIDE 20 MG/1
20 TABLET ORAL DAILY
Qty: 30 TABLET | Refills: 0 | Status: SHIPPED | OUTPATIENT
Start: 2025-01-11 | End: 2025-02-10

## 2025-01-11 RX ORDER — AMLODIPINE BESYLATE 10 MG/1
10 TABLET ORAL DAILY
Qty: 30 TABLET | Refills: 0 | Status: SHIPPED | OUTPATIENT
Start: 2025-01-11 | End: 2025-02-10

## 2025-01-11 RX ORDER — PREDNISONE 20 MG/1
60 TABLET ORAL DAILY
Qty: 15 TABLET | Refills: 0 | Status: SHIPPED | OUTPATIENT
Start: 2025-01-12 | End: 2025-01-17

## 2025-01-11 RX ORDER — DICYCLOMINE HYDROCHLORIDE 10 MG/1
10 CAPSULE ORAL 4 TIMES DAILY PRN
Qty: 30 CAPSULE | Refills: 0 | Status: SHIPPED | OUTPATIENT
Start: 2025-01-11 | End: 2025-02-10

## 2025-01-11 RX ORDER — METOPROLOL SUCCINATE 50 MG/1
50 TABLET, EXTENDED RELEASE ORAL DAILY
Qty: 30 TABLET | Refills: 0 | Status: SHIPPED | OUTPATIENT
Start: 2025-01-11 | End: 2025-02-10

## 2025-01-11 RX ORDER — IBUPROFEN 200 MG
400 TABLET ORAL EVERY 8 HOURS PRN
Status: DISCONTINUED | OUTPATIENT
Start: 2025-01-11 | End: 2025-01-11 | Stop reason: HOSPADM

## 2025-01-11 RX ORDER — NAPROXEN SODIUM 220 MG/1
81 TABLET, FILM COATED ORAL DAILY
Qty: 30 TABLET | Refills: 0 | Status: SHIPPED | OUTPATIENT
Start: 2025-01-12 | End: 2025-02-11

## 2025-01-11 RX ORDER — ATORVASTATIN CALCIUM 80 MG/1
80 TABLET, FILM COATED ORAL NIGHTLY
Qty: 30 TABLET | Refills: 0 | Status: SHIPPED | OUTPATIENT
Start: 2025-01-11 | End: 2025-02-10

## 2025-01-11 RX ORDER — CLOPIDOGREL BISULFATE 75 MG/1
75 TABLET ORAL DAILY
Qty: 30 TABLET | Refills: 0 | Status: SHIPPED | OUTPATIENT
Start: 2025-01-12 | End: 2025-01-11 | Stop reason: HOSPADM

## 2025-01-11 RX ORDER — POTASSIUM CHLORIDE 20 MEQ/1
20 TABLET, EXTENDED RELEASE ORAL ONCE
Status: COMPLETED | OUTPATIENT
Start: 2025-01-11 | End: 2025-01-11

## 2025-01-11 RX ORDER — DULOXETIN HYDROCHLORIDE 30 MG/1
30 CAPSULE, DELAYED RELEASE ORAL DAILY
Qty: 30 CAPSULE | Refills: 0 | Status: SHIPPED | OUTPATIENT
Start: 2025-01-11 | End: 2025-02-10

## 2025-01-11 RX ORDER — VALACYCLOVIR HYDROCHLORIDE 1 G/1
1000 TABLET, FILM COATED ORAL EVERY 12 HOURS SCHEDULED
Qty: 11 TABLET | Refills: 0 | Status: SHIPPED | OUTPATIENT
Start: 2025-01-11 | End: 2025-01-17

## 2025-01-11 RX ORDER — DICYCLOMINE HYDROCHLORIDE 10 MG/1
10 CAPSULE ORAL 4 TIMES DAILY PRN
Status: DISCONTINUED | OUTPATIENT
Start: 2025-01-11 | End: 2025-01-11 | Stop reason: HOSPADM

## 2025-01-11 RX ADMIN — ASPIRIN 81 MG 81 MG: 81 TABLET ORAL at 08:13

## 2025-01-11 RX ADMIN — CARBOXYMETHYLCELLULOSE SODIUM 1 DROP: 5 SOLUTION/ DROPS OPHTHALMIC at 15:13

## 2025-01-11 RX ADMIN — WHITE PETROLATUM 57.7 %-MINERAL OIL 31.9 % EYE OINTMENT 1 APPLICATION: at 02:43

## 2025-01-11 RX ADMIN — ENOXAPARIN SODIUM 40 MG: 40 INJECTION SUBCUTANEOUS at 08:14

## 2025-01-11 RX ADMIN — CARBOXYMETHYLCELLULOSE SODIUM 1 DROP: 5 SOLUTION/ DROPS OPHTHALMIC at 08:12

## 2025-01-11 RX ADMIN — DULOXETINE HYDROCHLORIDE 30 MG: 30 CAPSULE, DELAYED RELEASE ORAL at 08:13

## 2025-01-11 RX ADMIN — CLOPIDOGREL 75 MG: 75 TABLET ORAL at 08:13

## 2025-01-11 RX ADMIN — FUROSEMIDE 80 MG: 10 INJECTION, SOLUTION INTRAMUSCULAR; INTRAVENOUS at 08:14

## 2025-01-11 RX ADMIN — AMLODIPINE BESYLATE 10 MG: 10 TABLET ORAL at 08:13

## 2025-01-11 RX ADMIN — VALACYCLOVIR 1000 MG: 500 TABLET, FILM COATED ORAL at 08:13

## 2025-01-11 RX ADMIN — INSULIN LISPRO 2 UNITS: 100 INJECTION, SOLUTION INTRAVENOUS; SUBCUTANEOUS at 16:40

## 2025-01-11 RX ADMIN — CARBOXYMETHYLCELLULOSE SODIUM 1 DROP: 5 SOLUTION/ DROPS OPHTHALMIC at 11:13

## 2025-01-11 RX ADMIN — CARBOXYMETHYLCELLULOSE SODIUM 1 DROP: 5 SOLUTION/ DROPS OPHTHALMIC at 12:22

## 2025-01-11 RX ADMIN — POTASSIUM CHLORIDE 20 MEQ: 1500 TABLET, EXTENDED RELEASE ORAL at 11:13

## 2025-01-11 RX ADMIN — ISOSORBIDE MONONITRATE 30 MG: 30 TABLET, EXTENDED RELEASE ORAL at 08:12

## 2025-01-11 RX ADMIN — METOPROLOL SUCCINATE 50 MG: 50 TABLET, EXTENDED RELEASE ORAL at 08:13

## 2025-01-11 RX ADMIN — DICYCLOMINE HYDROCHLORIDE 10 MG: 10 CAPSULE ORAL at 11:13

## 2025-01-11 RX ADMIN — CARBOXYMETHYLCELLULOSE SODIUM 1 DROP: 5 SOLUTION/ DROPS OPHTHALMIC at 03:05

## 2025-01-11 RX ADMIN — INSULIN LISPRO 2 UNITS: 100 INJECTION, SOLUTION INTRAVENOUS; SUBCUTANEOUS at 11:36

## 2025-01-11 RX ADMIN — PREDNISONE 60 MG: 20 TABLET ORAL at 08:13

## 2025-01-11 ASSESSMENT — COGNITIVE AND FUNCTIONAL STATUS - GENERAL
DAILY ACTIVITIY SCORE: 24
MOBILITY SCORE: 24

## 2025-01-11 ASSESSMENT — PAIN SCALES - GENERAL: PAINLEVEL_OUTOF10: 0 - NO PAIN

## 2025-01-11 ASSESSMENT — PAIN - FUNCTIONAL ASSESSMENT: PAIN_FUNCTIONAL_ASSESSMENT: 0-10

## 2025-01-11 NOTE — PROGRESS NOTES
Iglesia Clarke is a 54 y.o. male on day 0 of admission presenting with Acute cerebrovascular accident (Multi).    Subjective   Patient reports some improvement in facial strength this morning. L eye feels irritated at times.       Objective     Last Recorded Vitals  Blood pressure (!) 168/117, pulse 88, temperature 36.5 °C (97.7 °F), temperature source Temporal, resp. rate 17, height 1.829 m (6'), weight 121 kg (267 lb 13.7 oz), SpO2 95%.    Mental status: A&Ox3. Follows simple commands. Language intact.   CN: PERRL 3 mm. EOM full range. Left upper and lower facial weakness. Left facial droop. Decreased wrinkles across left forehead. Unable to bury lashes on left with eye closure. Facial sensation intact bilaterally  Motor: LUE with 5/5 strength. RUE 5/5 strength. RLE 5/5  and LLE 5/5  Sensory: Normal sensation to LT bilaterally.    Scheduled medications  amLODIPine, 10 mg, oral, Daily  aspirin, 81 mg, oral, Daily  atorvastatin, 80 mg, oral, Nightly  clopidogrel, 75 mg, oral, Daily  DULoxetine, 30 mg, oral, Daily  enoxaparin, 40 mg, subcutaneous, q24h  furosemide, 80 mg, intravenous, BID  insulin lispro, 0-10 Units, subcutaneous, TID AC  isosorbide mononitrate ER, 30 mg, oral, Daily  lubricating eye drops, 1 drop, Left Eye, q1h  metoprolol succinate XL, 50 mg, oral, Daily  predniSONE, 60 mg, oral, Daily  sennosides-docusate sodium, 2 tablet, oral, BID  [Held by provider] torsemide, 20 mg, oral, Daily  valACYclovir, 1,000 mg, oral, q12h GAYATHRI      Continuous medications     PRN medications  PRN medications: acetaminophen **OR** acetaminophen, dextrose, dextrose, glucagon, glucagon, ibuprofen, oxygen, white petrolatum-mineral oiL    Imaging  MRI brain    No acute intracranial abnormality. Moderate microangiopathic disease  and mild diffuse parenchymal volume loss.       Iglesia Clarke is a 54 y.o. male who presented with L upper and lower facial weakness. On exam he is unable to close the left eye. There is decreased  wrinkles on the left forehead and left facial droop. No sensory abnormalities. No weakness or sensory changes in the extremities. Neurological exam most consistent with Bell's palsy, House-Brackmann V. MRI brain without evidence of acute infarct.     Recommendations    #L Bell's Palsy  -Start prednisone 60mg daily for 7 days.  -Start valacyclovir 1000mg every 12 hours for 7 days (adjusted for eGRF)  -apply artificial tears (liquid or gel) four times daily if needed  -During sleep, apply artificial tears ointment, and tape the eyelid shut.    Neurology will sign off. Follow up with neurology in 3-4 months.     I spent 35 minutes in the professional and overall care of this patient.    Andi Garrett MD

## 2025-01-11 NOTE — CONSULTS
Inpatient consult to Cardiology  Consult performed by: PAMELA Medina-CNP  Consult ordered by: Fredrick Joaquin DO        History Of Present Illness:    Iglesia Clarke is a 54 y.o. male from home with h/o cardiomyopathy (EF 25-30% 2/2023-->35% current), htn, obesity, VIC, CKD, depression, medication non-compliance presenting to the ER with left facial droop, left arm/leg numbness.  MRI brain negative for stroke.  Evaluated by Neurology and diagnosed with Bell's palsy.  Also reports chronic dyspnea.  Has not taken his cardiac medications for many months because they made him feel fatigued.  He did restart them one by one over the past week. Describes angioedema reaction after taking lisinopril.  No LE edema. Denies chest pain, orthopnea, or palpitations.      Last Recorded Vitals:  Vitals:    01/11/25 0300 01/11/25 0600 01/11/25 0802 01/11/25 0900   BP: 157/82  (!) 168/117    BP Location: Left arm  Left arm    Patient Position: Sitting  Sitting    Pulse: 94  88    Resp: 20  17    Temp: 36.6 °C (97.9 °F)  36.5 °C (97.7 °F)    TempSrc: Temporal  Temporal    SpO2: 95%  95% 95%   Weight:  121 kg (267 lb 13.7 oz)     Height:           Last Labs:  CBC - 1/11/2025:  6:32 AM  9.7 15.4 431    46.3      CMP - 1/11/2025:  6:32 AM  9.0 7.8 16 --- 0.3   3.0 3.9 11 66      PTT - 1/9/2025:  7:37 PM  1.0   11.6 38     Troponin I, High Sensitivity   Date/Time Value Ref Range Status   01/10/2025 06:40  (HH) 0 - 20 ng/L Final     Comment:     Previous result verified on 1/9/2025 2012 on specimen/case [id number] called with component [facility] for procedure Troponin I, High Sensitivity with value 119 ng/L.   01/09/2025 07:37  (HH) 0 - 20 ng/L Final   12/26/2024 09:05  (HH) 0 - 20 ng/L Final     Comment:     Previous result verified on 12/26/2024 1246 on specimen/case [id number] called with component [facility] for procedure Troponin I, High Sensitivity, Initial with value 126 ng/L.     BNP   Date/Time Value Ref  Range Status   01/11/2025 06:32  (H) 0 - 99 pg/mL Final   12/27/2024 06:06  (H) 0 - 99 pg/mL Final     Hemoglobin A1C   Date/Time Value Ref Range Status   01/10/2025 06:40 AM 6.1 (H) See comment % Final   02/10/2023 06:45 AM 6.4 (A) % Final     Comment:          Diagnosis of Diabetes-Adults   Non-Diabetic: < or = 5.6%   Increased risk for developing diabetes: 5.7-6.4%   Diagnostic of diabetes: > or = 6.5%  .       Monitoring of Diabetes                Age (y)     Therapeutic Goal (%)   Adults:          >18           <7.0   Pediatrics:    13-18           <7.5                   7-12           <8.0                   0- 6            7.5-8.5   American Diabetes Association. Diabetes Care 33(S1), Jan 2010.       LDL Calculated   Date/Time Value Ref Range Status   01/10/2025 06:40  (H) <=99 mg/dL Final     Comment:                                 Near   Borderline      AGE      Desirable  Optimal    High     High     Very High     0-19 Y     0 - 109     ---    110-129   >/= 130     ----    20-24 Y     0 - 119     ---    120-159   >/= 160     ----      >24 Y     0 -  99   100-129  130-159   160-189     >/=190     11/01/2024 11:15  (H) <=99 mg/dL Final     Comment:                                 Near   Borderline      AGE      Desirable  Optimal    High     High     Very High     0-19 Y     0 - 109     ---    110-129   >/= 130     ----    20-24 Y     0 - 119     ---    120-159   >/= 160     ----      >24 Y     0 -  99   100-129  130-159   160-189     >/=190     07/20/2024 04:25  (H) <=99 mg/dL Final     Comment:                                 Near   Borderline      AGE      Desirable  Optimal    High     High     Very High     0-19 Y     0 - 109     ---    110-129   >/= 130     ----    20-24 Y     0 - 119     ---    120-159   >/= 160     ----      >24 Y     0 -  99   100-129  130-159   160-189     >/=190       VLDL   Date/Time Value Ref Range Status   01/10/2025 06:40 AM 50 (H) 0 - 40 mg/dL  Final   2024 11:15 AM 74 (H) 0 - 40 mg/dL Final   2024 04:25 AM 27 0 - 40 mg/dL Final      Last I/O:  I/O last 3 completed shifts:  In: 600 (4.9 mL/kg) [P.O.:600]  Out: 3650 (30 mL/kg) [Urine:3650 (0.8 mL/kg/hr)]  Weight: 121.5 kg     Past Cardiology Tests (Last 3 Years):  EK/10/2025 NSR, HR 93bpm, RBBB, LVH     Echo:  Transthoracic Echo (TTE) Complete 01/10/2025  CONCLUSIONS:   1. The left ventricular systolic function is moderately decreased, with a visually estimated ejection fraction of 35%.   2. There is global hypokinesis of the left ventricle with minor regional variations.   3. Spectral Doppler shows an abnormal pattern of left ventricular diastolic filling.   4. Left ventricular cavity size is mildly dilated.   5. There is moderately increased septal thickness.   6. There is severely increased eccentric left ventricular hypertrophy.   7. There is normal right ventricular global systolic function.   8. The left atrium is moderately dilated.   9. Mild to moderate mitral valve regurgitation.  10. Moderately elevated pulmonary artery pressure, estimated RVSP 66mmHg.    Past Medical History:  He has a past medical history of Acute on chronic combined systolic (congestive) and diastolic (congestive) heart failure (2024), Acute respiratory failure with hypoxia (Multi) (2024), Anxiety and depression (2024), Kidney stones, Morbid (severe) obesity due to excess calories (Multi) (01/10/2025), and Non compliance w medication regimen (2024).    Past Surgical History:  He has no past surgical history on file.      Social History:  He reports that he has never smoked. He has never used smokeless tobacco. He reports that he does not currently use alcohol. He reports that he does not use drugs.    Family History:  No family history on file.     Allergies:  Patient has no known allergies.    Inpatient Medications:  Scheduled medications   Medication Dose Route Frequency    amLODIPine   10 mg oral Daily    aspirin  81 mg oral Daily    atorvastatin  80 mg oral Nightly    clopidogrel  75 mg oral Daily    DULoxetine  30 mg oral Daily    enoxaparin  40 mg subcutaneous q24h    furosemide  80 mg intravenous BID    insulin lispro  0-10 Units subcutaneous TID AC    isosorbide mononitrate ER  30 mg oral Daily    lubricating eye drops  1 drop Left Eye q1h    metoprolol succinate XL  50 mg oral Daily    predniSONE  60 mg oral Daily    sennosides-docusate sodium  2 tablet oral BID    [Held by provider] torsemide  20 mg oral Daily    valACYclovir  1,000 mg oral q12h GAYATHRI     PRN medications   Medication    acetaminophen    Or    acetaminophen    dextrose    dextrose    glucagon    glucagon    ibuprofen    oxygen    white petrolatum-mineral oiL     Continuous Medications   Medication Dose Last Rate     Outpatient Medications:  Current Outpatient Medications   Medication Instructions    acetaminophen (TYLENOL) 500 mg, Every 6 hours PRN    amLODIPine (NORVASC) 10 mg, oral, Daily    DULoxetine (CYMBALTA) 30 mg, oral, Daily, Do not crush or chew.    LORazepam (Ativan) 0.5 mg tablet 1 tablet, Every 12 hours scheduled (0630,1830)    metoprolol succinate XL (TOPROL XL) 50 mg, oral, Daily, Do not crush or chew.    torsemide (DEMADEX) 20 mg, oral, Daily       Physical Exam:  Constitutional: Pleasant, Awake/Alert/Oriented to person place and time.   Head: left facial droop   Eyes: EOMI. KENDALL  Neck: No JVD  Cardiovascular: Regular rate and rhythm, S1, S2. No extra heart sounds or murmurs  Respiratory: Clear to auscultation bilaterally. No wheezing, rales or rhonchi.   Abdomen: Soft, Nontender, Obese. Bowel sounds appreciated  Musculoskeletal: ROM intact. Muscle strength grossly intact upper and lower extremities 5/5.   Neurological: CNII-XII intact. Sensation grossly intact  Extremities: Warm and dry. No acute rashes and lesions  Psychiatric: Appropriate mood and affect       Assessment/Plan   54 y.o. male from home with  h/o cardiomyopathy (EF 25-30% 2/2023-->35% current), htn, obesity, VIC, CKD, depression, medication non-compliance presenting to the ER with left facial droop, left arm/leg numbness.      Assessment:  Bell's palsy  Acute on chronic systolic HF  Hypertension   CKD     Plan:  -Resume torsemide 20mg daily  -Resume Toprol XL 50mg daily  -Not on ACEi/ARB/ARNI due to CKD  -Consider hydralazine/isordil, however, given h/o medication noncompliance, would avoid TID dosing medication regimen  -Agree with starting amlodipine 10mg daily  -Follow up with [facility] clinic in [place]     Peripheral IV 01/09/25 20 G Right Antecubital (Active)   Site Assessment Clean;Dry;Intact 01/11/25 0840   Dressing Status Clean;Dry 01/11/25 0840   Number of days: 2       Code Status:  DNR and No Intubation and No ICU    PAMELA Medina-CNP

## 2025-01-11 NOTE — NURSING NOTE
10:30 Aide found patient on the floor and when asked if he fell he said that he didn't fall and that was more comfortable to be on the floor on his hands and knees. When I asked the pt to get off the floor he stated he could not get into bed and kept saying he didn't fall and this is just what he does to help with the pain.     10:35 Dr. Joaquin at bedside and talked to pt about getting CT and to get bentyl on to help with the spasms.

## 2025-01-11 NOTE — DISCHARGE SUMMARY
Discharge Diagnosis  #Bishop Palsy, left-sided   ##HFrEF, EF 35-40% (7/30/24):   #HTN    Issues Requiring Follow-Up  - Follow-up with cardiology outpatient within next month  - Follow up with neurology in next 3 to 4 months  - Follow-up with PCP in the next 2 weeks  - Outpatient follow-up with surgery for hernia repair    Discharge Meds     Medication List      START taking these medications     aspirin 81 mg chewable tablet; Chew 1 tablet (81 mg) once daily.; Start   taking on: January 12, 2025   atorvastatin 80 mg tablet; Commonly known as: Lipitor; Take 1 tablet (80   mg) by mouth once daily at bedtime.   dicyclomine 10 mg capsule; Commonly known as: Bentyl; Take 1 capsule (10   mg) by mouth 4 times a day as needed (abdominal discomfort).   lubricating eye drops ophthalmic solution; Administer 1 drop into the   left eye every 1 hour.   predniSONE 20 mg tablet; Commonly known as: Deltasone; Take 3 tablets   (60 mg) by mouth once daily for 5 doses.; Start taking on: January 12, 2025   valACYclovir 1 gram tablet; Commonly known as: Valtrex; Take 1 tablet   (1,000 mg) by mouth every 12 hours for 11 doses.   white petrolatum-mineral oiL 94-3 % ophthalmic ointment; Commonly known   as: Tears Naturale PM; Apply 1 Application to left eye 4 times a day as   needed for dry eyes.     CONTINUE taking these medications     acetaminophen 500 mg tablet; Commonly known as: Tylenol   amLODIPine 10 mg tablet; Commonly known as: Norvasc; Take 1 tablet (10   mg) by mouth once daily.   DULoxetine 30 mg DR capsule; Commonly known as: Cymbalta; Take 1 capsule   (30 mg) by mouth once daily. Do not crush or chew.   metoprolol succinate XL 50 mg 24 hr tablet; Commonly known as: Toprol   XL; Take 1 tablet (50 mg) by mouth once daily. Do not crush or chew.   torsemide 20 mg tablet; Commonly known as: Demadex; Take 1 tablet (20   mg) by mouth once daily.     ASK your doctor about these medications     LORazepam 0.5 mg tablet; Commonly known  as: Ativan       Test Results Pending At Discharge  Pending Labs       Order Current Status    C1 esterase inhibitor panel In process    C1 esterase inhibitor, functional In process    C1Q Complement In process    LYME (B. BURGDORFERI) AB MODIFIED 2-TITER TESTING, WITH REFLEX TO IGM AND IGG BY CLARITA In process    Procalcitonin In process            Hospital Course  Iglesia Clarke is a 54 y.o. male with PMHx HTN, HFrEF (EF 35-40% on 7/24), DM, CKD, NSTEMI, morbid obesity, anxiety, depression, noncompliance with medications and sleep apnea admitted as a transfer from [facility] for CVA workup. Neurology consulted, will see patient in the morning.     Upon arrival at [facility] ED, patient had an initial NIH stroke scale of 4. Patient was given labetalol for hypertension and CT scan demonstrated no acute process. Patient was given another dose of labetalol to keep blood pressure around roughly 200 systolic. On-call neurology was contacted and recommendation was that patient did not meet criteria for tPA. Patient was given aspirin and had repeated neurological exam demonstrating NIH score of 4. Patient was then transferred to [facility] for CVA workup.    While admitted at [facility], neurology was consulted. Isolated CN VII deficit without sparing was noted. No other neurological deficits. Head MRI showed no acute process, patient did not tolerate angio. Suspected isolated Bell's palsy, began on valacyclovir and prednisone for 7 day course. Recently left [facility] (12/26) for acute HF exacerbation, continues to have significant conversational dyspnea and hypertension. Patient not concerned at this time, but would like surgery for his hernia, was informed now and previously that he is not cleared medically without improvement of HF and HTN. Lasix was ordered and IV labetalol was continued. Patient also has bilateral supraclavicular swelling (patient notes reduced since lasix from last admit), C1Q, C4 and C1 esterase inhibitor  collected. Patient's meds were optimized and he was discharged on 01/11/2025 with following recs:     The following recommendations are made for you at time of hospital discharge:  Continue taking amlodipine 10 mg every day  Continue taking metoprolol ER succinate 50 mg every day  Continue taking torsemide 20 mg every day  - Please take your home medications as instructed prior to hospitalization.   - The following changes to your home medications have been made during your hospital stay:  Starting tomorrow, start taking prednisone 60 mg every day for 5 days  Starting tonight, start taking Valtrex/valacyclovir 1000 mg twice a day for 5 days  Start taking aspirin 81 mg every day  Start taking Atorvastatin 80 mg every night  - Cover your eye with a gauze or a patch to prevent corneal abrasion.  To lubricate your eyes, you can use lubricating eyedrops or ointment both of which have been prescribed to you  - Please follow-up with your primary care provider within 5-7 days from time of discharge. Likely will need to bring photo ID and insurance card to your appointment.    - Please follow-up with cardiology in 1 month after discharge  - Please follow-up with neurology in 3 to 4 months after discharge.  - Please follow up with surgery for hernia reduction     Pertinent Physical Exam At Time of Discharge  Physical Exam  HENT:     Some bilat redness on his upper neck. Moderate bilat swelling of supraclavicular region.  General Appearance:     No acute distress, sitting in bed  Cardiovascular:      Regular rhythm, normal rate.   Pulmonary:      Significant Respiratory effort on RA, significant conversational dyspnea. Normal breath sounds  Abdominal:      Nontender, nondistended, no masses on palpation  Neurological:      Cranial Nerves: Left-sided CN VII nerve deficit w/ facial asymmetry and weakness not sparing eyebrows. CN II-VI and VII-XII intact     No difficulty ambulating or cerebellar deficits.     Mental Status: He  is alert and oriented  Musculoskeletal:     Trace bilat lower extremity edema present    Outpatient Follow-Up  No future appointments.    Daily Mares MD

## 2025-01-11 NOTE — CARE PLAN
Uneventful night. Pt rested comfortably. Minimal c/o pain this shift. Pt getting eye drops q1h d/t dryness r/t inability to close his left eye. Pt planning on going home today; unsure if that is what the doctors have in mind. Pt continues to progress towards meeting goals. VSS. Will continue to monitor.

## 2025-01-11 NOTE — CARE PLAN
The patient's goals for the shift include comfort and sleep    The clinical goals for the shift include pt will remain hemodynamically stable and pt will remain safe this shift

## 2025-01-11 NOTE — DISCHARGE INSTRUCTIONS
You are now stable enough to be discharged home.    The following recommendations are made for you at time of hospital discharge:  Continue taking amlodipine 10 mg every day  Continue taking metoprolol ER succinate 50 mg every day  Continue taking torsemide 20 mg every day  - Please take your home medications as instructed prior to hospitalization.   - The following changes to your home medications have been made during your hospital stay:  Starting tomorrow, start taking prednisone 60 mg every day for 5 days  Starting tonight, start taking Valtrex/valacyclovir 1000 mg twice a day for 5 days  Start taking aspirin 81 mg every day  Start taking Atorvastatin 80 mg every night  - Cover your eye with a gauze or a patch to prevent corneal abrasion.  To lubricate your eyes, you can use lubricating eyedrops or ointment both of which have been prescribed to you  - Please follow-up with your primary care provider within 5-7 days from time of discharge. Likely will need to bring photo ID and insurance card to your appointment.    - Please follow-up with cardiology in 1 month after discharge  - Please follow-up with neurology in 3 to 4 months after discharge.  - Please follow up with nephrology for rt. Kidney atrophy  - Please follow up with surgery for hernia reduction   -  services has been requested to make an appointment for you however if you do not hear back from them within 2-3 days, please call [phone] to find out about appointments with  services.  - If you experience any worsening symptoms or any new acute concerns arise, please contact your primary care provider to discuss and possibly arrange an appointment. If you cannot get in touch with provider or severe symptoms are present, please return to nearest emergency room/urgent care for evaluation and treatment.

## 2025-01-11 NOTE — CARE PLAN
The patient's goals for the shift include   Problem: Fall/Injury  Goal: Not fall by end of shift  Outcome: Progressing  Goal: Be free from injury by end of the shift  Outcome: Progressing  Goal: Verbalize understanding of personal risk factors for fall in the hospital  Outcome: Progressing  Goal: Verbalize understanding of risk factor reduction measures to prevent injury from fall in the home  Outcome: Progressing  Goal: Use assistive devices by end of the shift  Outcome: Progressing  Goal: Pace activities to prevent fatigue by end of the shift  Outcome: Progressing     Problem: General Stroke  Goal: Establish a mutual long term goal with patient by discharge  Outcome: Progressing  Goal: Demonstrate improvement in neurological exam throughout the shift  Outcome: Progressing  Goal: Maintain BP within ordered limits throughout shift  Outcome: Progressing  Goal: Participate in treatment (ie., meds, therapy) throughout shift  Outcome: Progressing  Goal: No symptoms of aspiration throughout shift  Outcome: Progressing  Goal: No symptoms of hemorrhage throughout shift  Outcome: Progressing  Goal: Tolerate enteral feeding throughout shift  Outcome: Progressing  Goal: Decreased nausea/vomiting throughout shift  Outcome: Progressing  Goal: Controlled blood glucose throughout shift  Outcome: Progressing  Goal: Out of bed three times today  Outcome: Progressing     Problem: ICU Stroke  Goal: Maintain ICP within ordered limits throughout shift  Outcome: Progressing  Goal: Tolerate EVD clamping trial throughout shift  Outcome: Progressing  Goal: Tolerate ventilator weaning trial during shift  Outcome: Progressing  Goal: Maintain patent airway throughout shift  Outcome: Progressing  Goal: Achieve/maintain targeted sodium level throughout shift  Outcome: Progressing       The clinical goals for the shift include pt will remain hemodynamically stable and pt will remain safe this shift

## 2025-01-12 LAB
C1INH SERPL-MCNC: 43 MG/DL (ref 21–38)
PROCALCITONIN SERPL-MCNC: 0.67 NG/ML

## 2025-01-13 LAB
B BURGDOR IGG SERPL QL IA: 7.54 IV
B BURGDOR IGG+IGM SER IA-IMP: POSITIVE
B BURGDOR IGM SERPL QL IA: 1.65 IV
B BURGDOR.VLSE1+PEPC10 AB SER IA-ACNC: 6.03 IV

## 2025-01-13 NOTE — SIGNIFICANT EVENT
Follow Up Phone Call    Outgoing phone call    Spoke to: Iglesia Clarke Relationship:self   Phone number: [phone]      Outcome: contacted patient/ family   No chief complaint on file.         Diagnosis:Not applicable    States he is feeling better. No further questions or concerns.

## 2025-01-14 LAB — GLUCOSE BLD MANUAL STRIP-MCNC: 112 MG/DL (ref 74–99)

## 2025-01-15 ENCOUNTER — TELEPHONE (OUTPATIENT)
Dept: NEUROLOGY | Facility: CLINIC | Age: 55
End: 2025-01-15
Payer: MEDICARE

## 2025-01-15 DIAGNOSIS — A69.20 LYME DISEASE: Primary | ICD-10-CM

## 2025-01-15 LAB — C1INH ACT/NOR SERPL: 101 %

## 2025-01-15 RX ORDER — DOXYCYCLINE 100 MG/1
100 CAPSULE ORAL 2 TIMES DAILY
Qty: 28 CAPSULE | Refills: 0 | Status: SHIPPED | OUTPATIENT
Start: 2025-01-15 | End: 2025-01-29

## 2025-01-15 NOTE — PROGRESS NOTES
Patient was seen by me inpatient at [facility] for left Bell's Palsy. Lyme serology came back positive. I have started the patient on doxycycline 100mg BID for 14 days and placed a referral to ID. The patient was notified and expressed an understanding.     Reviewed and approved by JOSELYN HORN on 1/15/25 at 12:28 PM.

## 2025-01-15 NOTE — TELEPHONE ENCOUNTER
The patient was recently admitted to [facility] for left-sided Bell's palsy.  He has since been discharged.  Since discharge his Lyme antibody testing is positive.  I have tried to call the patient but the phone call went to voicemail.  I will continue to try.    Reviewed and approved by JOSELYN HORN on 1/15/25 at 8:55 AM.

## 2025-01-19 LAB
ATRIAL RATE: 110 BPM
P AXIS: 66 DEGREES
P OFFSET: 194 MS
P ONSET: 129 MS
PR INTERVAL: 164 MS
Q ONSET: 211 MS
QRS COUNT: 18 BEATS
QRS DURATION: 152 MS
QT INTERVAL: 378 MS
QTC CALCULATION(BAZETT): 511 MS
QTC FREDERICIA: 463 MS
R AXIS: -24 DEGREES
T AXIS: 103 DEGREES
T OFFSET: 400 MS
VENTRICULAR RATE: 110 BPM

## 2025-01-22 LAB — C1Q SERPL-MCNC: 16.7 MG/DL (ref 10.2–20.3)

## 2025-02-07 RX ORDER — HYDROXYZINE PAMOATE 25 MG/1
1 CAPSULE ORAL
COMMUNITY
Start: 2024-11-30

## 2025-02-10 ENCOUNTER — APPOINTMENT (OUTPATIENT)
Dept: CARDIOLOGY | Facility: CLINIC | Age: 55
End: 2025-02-10
Payer: MEDICARE

## 2025-02-10 VITALS
DIASTOLIC BLOOD PRESSURE: 89 MMHG | OXYGEN SATURATION: 95 % | BODY MASS INDEX: 36.57 KG/M2 | HEART RATE: 86 BPM | HEIGHT: 72 IN | WEIGHT: 270 LBS | SYSTOLIC BLOOD PRESSURE: 165 MMHG

## 2025-02-10 DIAGNOSIS — I50.33 ACUTE ON CHRONIC DIASTOLIC CONGESTIVE HEART FAILURE: ICD-10-CM

## 2025-02-10 DIAGNOSIS — J96.01 ACUTE RESPIRATORY FAILURE WITH HYPOXIA (MULTI): ICD-10-CM

## 2025-02-10 DIAGNOSIS — N18.9 CHRONIC KIDNEY DISEASE, UNSPECIFIED CKD STAGE: ICD-10-CM

## 2025-02-10 DIAGNOSIS — I50.22 CHRONIC SYSTOLIC HEART FAILURE: Primary | ICD-10-CM

## 2025-02-10 DIAGNOSIS — I10 BENIGN ESSENTIAL HTN: ICD-10-CM

## 2025-02-10 PROBLEM — R79.89 ELEVATED TROPONIN LEVEL NOT DUE MYOCARDIAL INFARCTION: Status: RESOLVED | Noted: 2024-12-26 | Resolved: 2025-02-10

## 2025-02-10 PROBLEM — I50.30 DIASTOLIC CONGESTIVE HEART FAILURE: Status: RESOLVED | Noted: 2024-07-19 | Resolved: 2025-02-10

## 2025-02-10 PROBLEM — R93.1 DECREASED CARDIAC EJECTION FRACTION: Status: RESOLVED | Noted: 2024-07-31 | Resolved: 2025-02-10

## 2025-02-10 PROBLEM — R60.0 PERIPHERAL EDEMA: Status: RESOLVED | Noted: 2025-01-10 | Resolved: 2025-02-10

## 2025-02-10 PROCEDURE — G2211 COMPLEX E/M VISIT ADD ON: HCPCS | Performed by: NURSE PRACTITIONER

## 2025-02-10 PROCEDURE — 3079F DIAST BP 80-89 MM HG: CPT | Performed by: NURSE PRACTITIONER

## 2025-02-10 PROCEDURE — 99215 OFFICE O/P EST HI 40 MIN: CPT | Performed by: NURSE PRACTITIONER

## 2025-02-10 PROCEDURE — 3077F SYST BP >= 140 MM HG: CPT | Performed by: NURSE PRACTITIONER

## 2025-02-10 PROCEDURE — 1036F TOBACCO NON-USER: CPT | Performed by: NURSE PRACTITIONER

## 2025-02-10 PROCEDURE — 3008F BODY MASS INDEX DOCD: CPT | Performed by: NURSE PRACTITIONER

## 2025-02-10 RX ORDER — TORSEMIDE 20 MG/1
40 TABLET ORAL DAILY
Qty: 60 TABLET | Refills: 0 | Status: SHIPPED | OUTPATIENT
Start: 2025-02-10 | End: 2025-03-12

## 2025-02-10 NOTE — PROGRESS NOTES
Primary Care Physician: Nick Petty MD    Date of Visit: 02/10/2025 11:30 AM EST  Location of visit:  [facility]   Type of Visit: Follow up               Chief Complaint   Patient presents with    Hospital Follow-up     Here for hospital follow up, wants to discuss having hernia surgery, weather he is strong enough to have it done       HPI / Summary:   Iglesia Clarke is a 54 y.o. male, known to Dr. Brady with cardiomyopathy LVEF 30-35%,  non-specific IVCD, HTN, HLD, CKD, morbid obesity, VIC and Pinckard Palsy presents for hospital follow up / pre operative cardiac evaluation prior to hernia repair.        Hospitalized 12/27/2024 with ADHF and again 1/09/2025 for acute HFrEF along with concern for CVA deemed Bell's Palsy.  LHC was deferred with concern for contrast further working renal function     Since discharge:   He is gaining weight, 264 --> 275 lbs and +1 edema of the BLE.    Takes torsemide every day but feels its not working.  Endorses compliance with Rx   Endorses PND,  orthopnea and dyspnea on minimal exertion.  Sometimes he can walk in a store, but most days only around the house. BP today is elevated    He is frustrated with the process of having a bothersome inguinal hernia repaired       12 system review is negative except as noted above  Accompanied by a friend who contributed to the history       Medical History:   Past Medical History:   Diagnosis Date    Acute on chronic combined systolic (congestive) and diastolic (congestive) heart failure 12/26/2024    Acute respiratory failure with hypoxia (Multi) 12/26/2024    Anxiety and depression 12/26/2024    Kidney stones     Morbid (severe) obesity due to excess calories (Multi) 01/10/2025    Noted by [facility]  last documented on [id number]      Non compliance w medication regimen 12/26/2024       Social History:   Tobacco Use: Low Risk  (1/9/2025)    Patient History     Smoking Tobacco Use: Never     Smokeless Tobacco Use: Never      Passive Exposure: Not on file         MEDICATIONS:   Current Outpatient Medications   Medication Instructions    acetaminophen (TYLENOL) 500 mg, Every 6 hours PRN    amLODIPine (NORVASC) 10 mg, oral, Daily    aspirin 81 mg, oral, Daily    atorvastatin (LIPITOR) 80 mg, oral, Nightly    dicyclomine (BENTYL) 10 mg, oral, 4 times daily PRN    DULoxetine (CYMBALTA) 30 mg, oral, Daily, Do not crush or chew.    hydrOXYzine pamoate (Vistaril) 25 mg capsule 1 capsule, Every 12 hours scheduled (0630,1830)    LORazepam (Ativan) 0.5 mg tablet 1 tablet, Every 12 hours scheduled (0630,1830)    lubricating eye drops ophthalmic solution 1 drop, Left Eye, Every 1 hour    metoprolol succinate XL (TOPROL XL) 50 mg, oral, Daily, Do not crush or chew.    torsemide (DEMADEX) 20 mg, oral, Daily    white petrolatum-mineral oiL (Tears Naturale PM) 94-3 % ophthalmic ointment 1 Application, Left Eye, 4 times daily PRN         IMAGING REPORTS INDEPENDENTLY REVIEWED:     Echocardiogram 1/10/2025  CONCLUSIONS:   1. The left ventricular systolic function is moderately decreased, with a visually estimated ejection fraction of 35%.   2. There is global hypokinesis of the left ventricle with minor regional variations.   3. Spectral Doppler shows an abnormal pattern of left ventricular diastolic filling.   4. Left ventricular cavity size is mildly dilated.   5. There is moderately increased septal thickness.   6. There is severely increased eccentric left ventricular hypertrophy.   7. There is normal right ventricular global systolic function.   8. The left atrium is moderately dilated.   9. Mild to moderate mitral valve regurgitation.  10. Moderately elevated pulmonary artery pressure, estimated RVSP 66mmH    Echocardiogram 7/30 thank you/2024  CONCLUSIONS:   1. Left ventricular ejection fraction is moderately decreased, by visual estimate at 35-40%.   2. There is global hypokinesis of the left ventricle with minor regional variations.   3.  Spectral Doppler shows an abnormal pattern of left ventricular diastolic filling.   4. Left ventricular cavity size is moderately dilated.   5. There is moderate to severe eccentric left ventricular hypertrophy.   6. Mildly enlarged right ventricle.   7. There is normal right ventricular global systolic function.   8. The left atrium is moderate to severely dilated.      Echocardiogram 2/10/2023  CONCLUSIONS:   1. Left ventricular systolic function is severely decreased with a 25-30% estimated ejection fraction.   2. Spectral Doppler shows an abnormal pattern of left ventricular diastolic filling.   3. There is moderate left ventricular hypertrophy.   4. There is mildly reduced right ventricular systolic function.   5. The left atrium is moderately dilated.           LABS:    CBC with Differential:    Lab Results   Component Value Date    WBC 9.7 01/11/2025    RBC 5.14 01/11/2025    HGB 15.4 01/11/2025    HCT 46.3 01/11/2025     01/11/2025    MCV 90 01/11/2025    MCH 30.0 01/11/2025    MCHC 33.3 01/11/2025    RDW 13.8 01/11/2025    NRBC 0.0 01/11/2025    LYMPHOPCT 20.7 01/09/2025    MONOPCT 10.8 01/09/2025    EOSPCT 1.6 01/09/2025    BASOPCT 0.6 01/09/2025    MONOSABS 0.89 01/09/2025    LYMPHSABS 1.71 01/09/2025    EOSABS 0.13 01/09/2025    BASOSABS 0.05 01/09/2025     CMP:    Lab Results   Component Value Date     (L) 01/11/2025    K 3.8 01/11/2025     01/11/2025    CO2 19 (L) 01/11/2025    BUN 38 (H) 01/11/2025    CREATININE 2.57 (H) 01/11/2025    GLUCOSE 126 (H) 01/11/2025    PROT 7.8 01/11/2025    CALCIUM 9.0 01/11/2025    BILITOT 0.3 01/11/2025    ALKPHOS 66 01/11/2025    AST 16 01/11/2025    ALT 11 01/11/2025     BMP:    Lab Results   Component Value Date     (L) 01/11/2025    K 3.8 01/11/2025     01/11/2025    CO2 19 (L) 01/11/2025    BUN 38 (H) 01/11/2025    CREATININE 2.57 (H) 01/11/2025    CALCIUM 9.0 01/11/2025    GLUCOSE 126 (H) 01/11/2025     Magnesium:  Lab Results    Component Value Date    MG 2.06 01/11/2025     Troponin:    Lab Results   Component Value Date    TROPHS 101 (HH) 01/10/2025    TROPHS 119 (HH) 01/09/2025    TROPHS 131 (HH) 12/26/2024     BNP:   Lab Results   Component Value Date     (H) 01/11/2025         Lipid Panel:  Lab Results   Component Value Date    HDL 38.8 01/10/2025    CHHDL 5.3 01/10/2025    VLDL 50 (H) 01/10/2025    TRIG 252 (H) 01/10/2025    NHDL 166 (H) 01/10/2025        Lab work and imaging results independently reviewed by me     Visit Vitals  Smoking Status Never         ECG dated 1/10/2025 independently reviewed   SR 93,  RBBB, baseline artifact        Constitutional:       Appearance: Healthy appearance. Not in distress. Morbidly obese.   Eyes:      Comments: Left eye patched    Neck:      Vascular: JVD normal.   Pulmonary:      Effort: Pulmonary effort is normal.      Breath sounds: Normal breath sounds.   Cardiovascular:      PMI at left midclavicular line. Normal rate. Regular rhythm. Normal S1. Normal S2.       Murmurs: There is no murmur.      No rub.   Pulses:     Intact distal pulses.   Edema:     Peripheral edema present.     Pretibial: bilateral 2+ edema of the pretibial area.     Ankle: bilateral 2+ edema of the ankle.     Feet: bilateral 2+ edema of the feet.  Abdominal:      General: Bowel sounds are normal.   Musculoskeletal:      Cervical back: Neck supple. Skin:     General: Skin is warm and dry.   Neurological:      Mental Status: Alert and oriented to person, place and time.         Problem List Items Addressed This Visit             ICD-10-CM    Benign essential HTN I10    CKD (chronic kidney disease) N18.9    Acute respiratory failure with hypoxia (Multi) J96.01    Relevant Medications    torsemide (Demadex) 20 mg tablet    Chronic systolic heart failure - Primary I50.22    Congestive heart failure I50.9    Relevant Medications    torsemide (Demadex) 20 mg tablet         Dx: Chronic HFrEF,  NYHA class III volume  overloaded today,  suspect cardio-renal syndrome,  hypertensive heart diseases  with CKD and long standing uncontrolled HTN       He is most concerned about having the hernia fixed and unable to participate in discussion re: chronic HF with current volume overload   --Increase torsemide to 40 mg daily   --continue Toprol XL 50 mg once daily   Would consider stopping amlodipine and adding ISMN + Hydralazine as part of GDMT optimization   --Unable to add SGLT2 inhibitor or MRA   He should establish with outpatient Nephrology       RCRI is 2  (chronic HFrEF,  CKD crt > 2)  indicating 10.1 % risk of 30 day Major adverse cardiac event,  once clinically euvolemic   ECG is NSR with non-specific intraventricular conduction delay   Ok for OR with intermediate cardiac risk.  Surgery should be done at a facility  with 24/7 cardiology service cath lab capacity.  He is at risk for perioperative volume overload / decompensated HF          I spoke with Dr. Ramírez via secure messaging re: follow up hernia candidacy.  He previously referred him to Dr. Mooney at [facility],  whom I encouraged Mr. Clarke to call for an appointment     02/10/25 at 11:50 AM - PAMELA Garcia-CNP      Orders:  No orders of the defined types were placed in this encounter.    60 minutes day of the visit     Followup Appts:  No future appointments.

## 2025-02-19 ENCOUNTER — TELEPHONE (OUTPATIENT)
Dept: CARDIOLOGY | Facility: HOSPITAL | Age: 55
End: 2025-02-19
Payer: MEDICARE

## 2025-02-19 DIAGNOSIS — N18.9 CHRONIC KIDNEY DISEASE, UNSPECIFIED CKD STAGE: ICD-10-CM

## 2025-02-19 DIAGNOSIS — I50.22 CHRONIC SYSTOLIC HEART FAILURE: Primary | ICD-10-CM

## 2025-02-19 NOTE — TELEPHONE ENCOUNTER
Pt states he has been taking torsemide to 40 mg daily. States he is normally between 266- 275. States he has been 275-276 for 3 days now. States he recently starting lifting weights and fells like the excess fluid is moving up to his neck. States he fells like thi is what happened last time when he got admitted. Pt denies SOB at this time. Pt is concerned he is not getting rid of enough excess fluid.     Pt verbalizes understanding to take another dose of torsemide today   Marcia Koch CNP put in orders for lab work --- he should have drawn before making other Rx changes   He should not overexert himself or lift weights if he is symptomatic    If he is more SOB than baseline, he should go to the ER      Pt aggresses to this plan and has no further questions at this time.

## 2025-02-22 LAB
ANION GAP SERPL CALCULATED.4IONS-SCNC: 16 MMOL/L (CALC) (ref 7–17)
BNP SERPL-MCNC: NORMAL PG/ML
BUN SERPL-MCNC: 35 MG/DL (ref 7–25)
BUN/CREAT SERPL: 15 (CALC) (ref 6–22)
CALCIUM SERPL-MCNC: 8.7 MG/DL (ref 8.6–10.3)
CHLORIDE SERPL-SCNC: 101 MMOL/L (ref 98–110)
CO2 SERPL-SCNC: 24 MMOL/L (ref 20–32)
CREAT SERPL-MCNC: 2.39 MG/DL (ref 0.7–1.3)
EGFRCR SERPLBLD CKD-EPI 2021: 31 ML/MIN/1.73M2
GLUCOSE SERPL-MCNC: 118 MG/DL (ref 65–139)
POTASSIUM SERPL-SCNC: 3.7 MMOL/L (ref 3.5–5.3)
SODIUM SERPL-SCNC: 141 MMOL/L (ref 135–146)

## 2025-02-24 LAB
ANION GAP SERPL CALCULATED.4IONS-SCNC: 16 MMOL/L (CALC) (ref 7–17)
BNP SERPL-MCNC: 969 PG/ML
BUN SERPL-MCNC: 35 MG/DL (ref 7–25)
BUN/CREAT SERPL: 15 (CALC) (ref 6–22)
CALCIUM SERPL-MCNC: 8.7 MG/DL (ref 8.6–10.3)
CHLORIDE SERPL-SCNC: 101 MMOL/L (ref 98–110)
CO2 SERPL-SCNC: 24 MMOL/L (ref 20–32)
CREAT SERPL-MCNC: 2.39 MG/DL (ref 0.7–1.3)
EGFRCR SERPLBLD CKD-EPI 2021: 31 ML/MIN/1.73M2
GLUCOSE SERPL-MCNC: 118 MG/DL (ref 65–139)
POTASSIUM SERPL-SCNC: 3.7 MMOL/L (ref 3.5–5.3)
SODIUM SERPL-SCNC: 141 MMOL/L (ref 135–146)

## 2025-03-05 LAB
ATRIAL RATE: 76 BPM
P AXIS: 34 DEGREES
P OFFSET: 165 MS
P ONSET: 95 MS
PR INTERVAL: 186 MS
Q ONSET: 188 MS
QRS COUNT: 13 BEATS
QRS DURATION: 140 MS
QT INTERVAL: 460 MS
QTC CALCULATION(BAZETT): 517 MS
QTC FREDERICIA: 497 MS
R AXIS: -23 DEGREES
T AXIS: 255 DEGREES
T OFFSET: 418 MS
VENTRICULAR RATE: 76 BPM

## 2025-03-07 DIAGNOSIS — J96.01 ACUTE RESPIRATORY FAILURE WITH HYPOXIA (MULTI): ICD-10-CM

## 2025-03-07 DIAGNOSIS — I50.33 ACUTE ON CHRONIC DIASTOLIC CONGESTIVE HEART FAILURE: ICD-10-CM

## 2025-03-11 RX ORDER — TORSEMIDE 20 MG/1
40 TABLET ORAL DAILY
Qty: 180 TABLET | Refills: 3 | Status: SHIPPED | OUTPATIENT
Start: 2025-03-11

## 2025-03-24 ENCOUNTER — TELEPHONE (OUTPATIENT)
Dept: PULMONOLOGY | Facility: CLINIC | Age: 55
End: 2025-03-24
Payer: MEDICARE

## 2025-03-24 NOTE — TELEPHONE ENCOUNTER
03/24/2025 Called pateint regarding referral to pulmonary placed in January for a 6mm nodule. Patient has not scheduled this appointment. Call went to voicemail. Left message with number to call to schedule if he would like. Raymon Rodriguez RN.

## 2025-05-07 ENCOUNTER — APPOINTMENT (OUTPATIENT)
Dept: CARDIOLOGY | Facility: HOSPITAL | Age: 55
End: 2025-05-07
Payer: MEDICARE

## 2025-05-07 ENCOUNTER — HOSPITAL ENCOUNTER (EMERGENCY)
Facility: HOSPITAL | Age: 55
Discharge: HOME | End: 2025-05-07
Attending: FAMILY MEDICINE
Payer: MEDICARE

## 2025-05-07 ENCOUNTER — APPOINTMENT (OUTPATIENT)
Dept: RADIOLOGY | Facility: HOSPITAL | Age: 55
End: 2025-05-07
Payer: MEDICARE

## 2025-05-07 VITALS
SYSTOLIC BLOOD PRESSURE: 148 MMHG | RESPIRATION RATE: 18 BRPM | WEIGHT: 270 LBS | BODY MASS INDEX: 36.57 KG/M2 | HEART RATE: 101 BPM | HEIGHT: 72 IN | DIASTOLIC BLOOD PRESSURE: 112 MMHG | OXYGEN SATURATION: 96 % | TEMPERATURE: 98.2 F

## 2025-05-07 DIAGNOSIS — I50.9 CHRONIC CONGESTIVE HEART FAILURE, UNSPECIFIED HEART FAILURE TYPE: ICD-10-CM

## 2025-05-07 DIAGNOSIS — I48.91 ATRIAL FIBRILLATION, UNSPECIFIED TYPE (MULTI): Primary | ICD-10-CM

## 2025-05-07 DIAGNOSIS — N18.9 ACUTE ON CHRONIC RENAL INSUFFICIENCY: ICD-10-CM

## 2025-05-07 DIAGNOSIS — N28.9 ACUTE ON CHRONIC RENAL INSUFFICIENCY: ICD-10-CM

## 2025-05-07 LAB
ALBUMIN SERPL BCP-MCNC: 4.1 G/DL (ref 3.4–5)
ALP SERPL-CCNC: 55 U/L (ref 33–120)
ALT SERPL W P-5'-P-CCNC: 8 U/L (ref 10–52)
ANION GAP SERPL CALC-SCNC: 12 MMOL/L (ref 10–20)
APTT PPP: 32 SECONDS (ref 26–36)
AST SERPL W P-5'-P-CCNC: 11 U/L (ref 9–39)
BASOPHILS # BLD AUTO: 0.06 X10*3/UL (ref 0–0.1)
BASOPHILS NFR BLD AUTO: 0.6 %
BILIRUB SERPL-MCNC: 0.4 MG/DL (ref 0–1.2)
BUN SERPL-MCNC: 52 MG/DL (ref 6–23)
CALCIUM SERPL-MCNC: 9.2 MG/DL (ref 8.6–10.3)
CARDIAC TROPONIN I PNL SERPL HS: 62 NG/L (ref 0–20)
CARDIAC TROPONIN I PNL SERPL HS: 63 NG/L (ref 0–20)
CHLORIDE SERPL-SCNC: 107 MMOL/L (ref 98–107)
CO2 SERPL-SCNC: 21 MMOL/L (ref 21–32)
CREAT SERPL-MCNC: 3 MG/DL (ref 0.5–1.3)
EGFRCR SERPLBLD CKD-EPI 2021: 24 ML/MIN/1.73M*2
EOSINOPHIL # BLD AUTO: 0.08 X10*3/UL (ref 0–0.7)
EOSINOPHIL NFR BLD AUTO: 0.9 %
ERYTHROCYTE [DISTWIDTH] IN BLOOD BY AUTOMATED COUNT: 15.3 % (ref 11.5–14.5)
ETHANOL SERPL-MCNC: <10 MG/DL
GLUCOSE SERPL-MCNC: 142 MG/DL (ref 74–99)
HCT VFR BLD AUTO: 44.3 % (ref 41–52)
HGB BLD-MCNC: 14.1 G/DL (ref 13.5–17.5)
IMM GRANULOCYTES # BLD AUTO: 0.02 X10*3/UL (ref 0–0.7)
IMM GRANULOCYTES NFR BLD AUTO: 0.2 % (ref 0–0.9)
INR PPP: 1.2 (ref 0.9–1.1)
LYMPHOCYTES # BLD AUTO: 1.16 X10*3/UL (ref 1.2–4.8)
LYMPHOCYTES NFR BLD AUTO: 12.5 %
MAGNESIUM SERPL-MCNC: 2.12 MG/DL (ref 1.6–2.4)
MCH RBC QN AUTO: 29.3 PG (ref 26–34)
MCHC RBC AUTO-ENTMCNC: 31.8 G/DL (ref 32–36)
MCV RBC AUTO: 92 FL (ref 80–100)
MONOCYTES # BLD AUTO: 0.92 X10*3/UL (ref 0.1–1)
MONOCYTES NFR BLD AUTO: 9.9 %
NEUTROPHILS # BLD AUTO: 7.07 X10*3/UL (ref 1.2–7.7)
NEUTROPHILS NFR BLD AUTO: 75.9 %
NRBC BLD-RTO: 0 /100 WBCS (ref 0–0)
PLATELET # BLD AUTO: 310 X10*3/UL (ref 150–450)
POTASSIUM SERPL-SCNC: 4 MMOL/L (ref 3.5–5.3)
PROT SERPL-MCNC: 8.2 G/DL (ref 6.4–8.2)
PROTHROMBIN TIME: 13.4 SECONDS (ref 9.8–12.4)
RBC # BLD AUTO: 4.82 X10*6/UL (ref 4.5–5.9)
SODIUM SERPL-SCNC: 136 MMOL/L (ref 136–145)
WBC # BLD AUTO: 9.3 X10*3/UL (ref 4.4–11.3)

## 2025-05-07 PROCEDURE — 99285 EMERGENCY DEPT VISIT HI MDM: CPT | Mod: 25 | Performed by: FAMILY MEDICINE

## 2025-05-07 PROCEDURE — 93005 ELECTROCARDIOGRAM TRACING: CPT

## 2025-05-07 PROCEDURE — 71045 X-RAY EXAM CHEST 1 VIEW: CPT | Mod: FOREIGN READ | Performed by: RADIOLOGY

## 2025-05-07 PROCEDURE — 80053 COMPREHEN METABOLIC PANEL: CPT | Performed by: FAMILY MEDICINE

## 2025-05-07 PROCEDURE — 36415 COLL VENOUS BLD VENIPUNCTURE: CPT | Performed by: FAMILY MEDICINE

## 2025-05-07 PROCEDURE — 2500000001 HC RX 250 WO HCPCS SELF ADMINISTERED DRUGS (ALT 637 FOR MEDICARE OP): Performed by: FAMILY MEDICINE

## 2025-05-07 PROCEDURE — 84484 ASSAY OF TROPONIN QUANT: CPT | Performed by: FAMILY MEDICINE

## 2025-05-07 PROCEDURE — 83735 ASSAY OF MAGNESIUM: CPT | Performed by: FAMILY MEDICINE

## 2025-05-07 PROCEDURE — 71045 X-RAY EXAM CHEST 1 VIEW: CPT

## 2025-05-07 PROCEDURE — 85610 PROTHROMBIN TIME: CPT | Performed by: FAMILY MEDICINE

## 2025-05-07 PROCEDURE — 85025 COMPLETE CBC W/AUTO DIFF WBC: CPT | Performed by: FAMILY MEDICINE

## 2025-05-07 PROCEDURE — 82077 ASSAY SPEC XCP UR&BREATH IA: CPT | Performed by: FAMILY MEDICINE

## 2025-05-07 PROCEDURE — 85730 THROMBOPLASTIN TIME PARTIAL: CPT | Performed by: FAMILY MEDICINE

## 2025-05-07 RX ADMIN — APIXABAN 5 MG: 5 TABLET, FILM COATED ORAL at 14:23

## 2025-05-07 ASSESSMENT — PAIN - FUNCTIONAL ASSESSMENT
PAIN_FUNCTIONAL_ASSESSMENT: 0-10

## 2025-05-07 ASSESSMENT — PAIN SCALES - GENERAL
PAINLEVEL_OUTOF10: 0 - NO PAIN

## 2025-05-07 NOTE — ED TRIAGE NOTES
Pt to ED, states he was sent after having pre-op testing and they were concerned with his EKG. Pt denies any symptoms. EKG obtained and given to physician. Placed on all monitoring equipment.

## 2025-05-07 NOTE — ED PROVIDER NOTES
Emergency Department Provider Note       History of Present Illness     History provided by: Patient  Limitations to History: None  External Records Reviewed with Brief Summary: Reviewed care everywhere/epic chart    HPI:  Iglesia Clarke is a 54 y.o. male with history of CKD, anxiety, depression, CHF, PVD, medication noncompliance, inguinal hernia, hyperlipidemia, and hypertension comes to the ED as recommended by his preop doctor today that he saw due to concern of changes on his EKG.  Patient reports he is otherwise doing well and only came to the ED as advised to have this abnormal EKG assessed.  Patient in the ED is alert, cooperative, peers anxious, comfortable, and in no distress.  Patient reports again at this time he feels otherwise fine and just wants to know what he needs to do about this irregular EKG he was told about.        Triage vitals:  T 36.9 °C (98.4 °F)  HR (!) 105  BP (!) 134/109  RR 20  O2 93 % None (Room air)    Physical Exam  Vitals and nursing note reviewed.   Constitutional:       Appearance: Normal appearance.   HENT:      Head: Normocephalic and atraumatic.      Nose: Nose normal.      Mouth/Throat:      Mouth: Mucous membranes are moist.   Eyes:      Extraocular Movements: Extraocular movements intact.      Conjunctiva/sclera: Conjunctivae normal.      Pupils: Pupils are equal, round, and reactive to light.   Cardiovascular:      Rate and Rhythm: Tachycardia present. Rhythm irregular.      Pulses: Normal pulses.      Heart sounds: Normal heart sounds, S1 normal and S2 normal.   Pulmonary:      Effort: Pulmonary effort is normal.   Abdominal:      General: Abdomen is flat.   Musculoskeletal:         General: Normal range of motion.      Cervical back: Normal range of motion and neck supple.   Skin:     General: Skin is warm.      Capillary Refill: Capillary refill takes less than 2 seconds.   Neurological:      General: No focal deficit present.      Mental Status: He is alert and  oriented to person, place, and time.      GCS: GCS eye subscore is 4. GCS verbal subscore is 5. GCS motor subscore is 6.   Psychiatric:         Mood and Affect: Mood normal.         Labs Reviewed   CBC WITH AUTO DIFFERENTIAL - Abnormal       Result Value    WBC 9.3      nRBC 0.0      RBC 4.82      Hemoglobin 14.1      Hematocrit 44.3      MCV 92      MCH 29.3      MCHC 31.8 (*)     RDW 15.3 (*)     Platelets 310      Neutrophils % 75.9      Immature Granulocytes %, Automated 0.2      Lymphocytes % 12.5      Monocytes % 9.9      Eosinophils % 0.9      Basophils % 0.6      Neutrophils Absolute 7.07      Immature Granulocytes Absolute, Automated 0.02      Lymphocytes Absolute 1.16 (*)     Monocytes Absolute 0.92      Eosinophils Absolute 0.08      Basophils Absolute 0.06     COMPREHENSIVE METABOLIC PANEL - Abnormal    Glucose 142 (*)     Sodium 136      Potassium 4.0      Chloride 107      Bicarbonate 21      Anion Gap 12      Urea Nitrogen 52 (*)     Creatinine 3.00 (*)     eGFR 24 (*)     Calcium 9.2      Albumin 4.1      Alkaline Phosphatase 55      Total Protein 8.2      AST 11      Bilirubin, Total 0.4      ALT 8 (*)    PROTIME-INR - Abnormal    Protime 13.4 (*)     INR 1.2 (*)    TROPONIN I, HIGH SENSITIVITY - Abnormal    Troponin I, High Sensitivity 63 (*)     Narrative:     Less than 99th percentile of normal range cutoff-  Female and children under 18 years old <14 ng/L; Male <21 ng/L: Negative  Repeat testing should be performed if clinically indicated.     Female and children under 18 years old 14-50 ng/L; Male 21-50 ng/L:  Consistent with possible cardiac damage and possible increased clinical   risk. Serial measurements may help to assess extent of myocardial damage.     >50 ng/L: Consistent with cardiac damage, increased clinical risk and  myocardial infarction. Serial measurements may help assess extent of   myocardial damage.      NOTE: Children less than 1 year old may have higher baseline troponin    levels and results should be interpreted in conjunction with the overall   clinical context.     NOTE: Troponin I testing is performed using a different   testing methodology at [facility] than at other   system [facility]. Direct result comparisons should only   be made within the same method.   TROPONIN I, HIGH SENSITIVITY - Abnormal    Troponin I, High Sensitivity 62 (*)     Narrative:     Less than 99th percentile of normal range cutoff-  Female and children under 18 years old <14 ng/L; Male <21 ng/L: Negative  Repeat testing should be performed if clinically indicated.     Female and children under 18 years old 14-50 ng/L; Male 21-50 ng/L:  Consistent with possible cardiac damage and possible increased clinical   risk. Serial measurements may help to assess extent of myocardial damage.     >50 ng/L: Consistent with cardiac damage, increased clinical risk and  myocardial infarction. Serial measurements may help assess extent of   myocardial damage.      NOTE: Children less than 1 year old may have higher baseline troponin   levels and results should be interpreted in conjunction with the overall   clinical context.     NOTE: Troponin I testing is performed using a different   testing methodology at [facility] than at other   [facility]. Direct result comparisons should only   be made within the same method.   MAGNESIUM - Normal    Magnesium 2.12     ALCOHOL - Normal    Alcohol <10     APTT - Normal    aPTT 32      Narrative:     The APTT is no longer used for monitoring Unfractionated Heparin Therapy. For monitoring Heparin Therapy, use the Heparin Assay.   DRUG SCREEN,URINE     XR chest 1 view   Final Result   Cardiomegaly.   Signed by Angelito Luis MD          Medical Decision Making & ED Course     Pt upon arrival to the ED appeared to be anxious but comfortable and in no distress with irregular heartbeat and hypertension.  Discussed with pt the presenting complaints and  clinically findings.  Reviewed with pt the epic chart and counseled the patient on arrhythmias and appropriate approach to management/treatments.  After assessment and evaluation IV line was started, labs sent, imaging ordered, EKG reviewed, placed on cardiac monitor, and observed.  At this time patient was reevaluated continued to have no change in presentation, still doing well with no physical complaints, blood pressure still elevated but at patient's baseline, and heart rate still appears to be A-fib but rate controlled from low 90s to 110.  Results were reviewed/discussed with patient and he was advised this time that cardiology will be consulted and case discussed with them regarding final disposition.  At this time Case and findings were discussed with Dr. Persaud and was recommended patient continue on his home medications that can maintain his rate control and that as he is continued to be asymptomatic to start him on Eliquis twice daily with outpatient echo set up as well as follow-up with him in the next several days for assessment of this possible new onset A-fib and continued care.  Patient was informed of these findings and agreeable plan of care the patient is given oral dose of Eliquis in the ED as well as prescription for home.  Patient is also educated appropriate use of anticoagulation and recommendations for monitoring and management.  At this time patient continues to be doing well in the ED with again no physical complaints and is rate controlled with baseline blood pressure appropriate for patient.  Patient stable at this time and discharged home to follow-up with Dr. Persaud.      Social Determinants of Health which Significantly Impact Care: Social Determinants of Health which Significantly Impact Care: None identified     EKG Independent Interpretation: 1217 -- A-fib rate 97.  Left axis deviation.  Normal intervals.  RBBB, nonspecific ST-T wave changes with no findings of STEMI.  Changed  compared to old EKG      Independent Result Review and Interpretation:     Chronic conditions affecting the patient's care: As documented above in [facility]    The patient was discussed with the following consultants/services: Dr. Persaud    Care Considerations: As documented above in [facility]    ED Course:  Diagnoses as of 05/07/25 1536   Atrial fibrillation, unspecified type (Multi)   Acute on chronic renal insufficiency   Chronic congestive heart failure, unspecified heart failure type       Disposition   As a result of the work-up, the patient was discharged home.  he was informed of his diagnosis and instructed to come back with any concerns or worsening of condition.  he and was agreeable to the plan as discussed above.  he was given the opportunity to ask questions.  All of the patient's questions were answered.    Procedures   Procedures        Paxton Bowling MD  Emergency Medicine                                                            Paxton Bowling MD  05/07/25 6624

## 2025-05-08 DIAGNOSIS — I50.22 CHRONIC SYSTOLIC HEART FAILURE: ICD-10-CM

## 2025-05-08 DIAGNOSIS — I50.9 CONGESTIVE HEART FAILURE, UNSPECIFIED HF CHRONICITY, UNSPECIFIED HEART FAILURE TYPE: ICD-10-CM

## 2025-05-08 DIAGNOSIS — R06.02 SHORTNESS OF BREATH: ICD-10-CM

## 2025-05-09 LAB
ATRIAL RATE: 150 BPM
Q ONSET: 227 MS
QRS COUNT: 16 BEATS
QRS DURATION: 138 MS
QT INTERVAL: 400 MS
QTC CALCULATION(BAZETT): 508 MS
QTC FREDERICIA: 469 MS
R AXIS: -51 DEGREES
T AXIS: 118 DEGREES
T OFFSET: 427 MS
VENTRICULAR RATE: 97 BPM

## 2025-05-19 ENCOUNTER — APPOINTMENT (OUTPATIENT)
Dept: CARDIOLOGY | Facility: CLINIC | Age: 55
End: 2025-05-19
Payer: MEDICARE

## 2025-06-10 ENCOUNTER — TELEPHONE (OUTPATIENT)
Dept: CARDIOLOGY | Facility: CLINIC | Age: 55
End: 2025-06-10
Payer: MEDICARE

## 2025-06-10 NOTE — TELEPHONE ENCOUNTER
Spoke to emergency contact and advised pt needs a cardiac appt for clearance for upcoming surgery. Pt has not called back yet to schedule an appt